# Patient Record
Sex: FEMALE | Race: BLACK OR AFRICAN AMERICAN | Employment: UNEMPLOYED | URBAN - METROPOLITAN AREA
[De-identification: names, ages, dates, MRNs, and addresses within clinical notes are randomized per-mention and may not be internally consistent; named-entity substitution may affect disease eponyms.]

---

## 2017-01-19 ENCOUNTER — ED HISTORICAL/CONVERTED ENCOUNTER (OUTPATIENT)
Dept: OTHER | Age: 27
End: 2017-01-19

## 2017-01-23 ENCOUNTER — ED HISTORICAL/CONVERTED ENCOUNTER (OUTPATIENT)
Dept: OTHER | Age: 27
End: 2017-01-23

## 2017-04-15 ENCOUNTER — ED HISTORICAL/CONVERTED ENCOUNTER (OUTPATIENT)
Dept: OTHER | Age: 27
End: 2017-04-15

## 2018-07-12 ENCOUNTER — ED HISTORICAL/CONVERTED ENCOUNTER (OUTPATIENT)
Dept: OTHER | Age: 28
End: 2018-07-12

## 2018-09-30 ENCOUNTER — ED HISTORICAL/CONVERTED ENCOUNTER (OUTPATIENT)
Dept: OTHER | Age: 28
End: 2018-09-30

## 2019-05-12 ENCOUNTER — ED HISTORICAL/CONVERTED ENCOUNTER (OUTPATIENT)
Dept: OTHER | Age: 29
End: 2019-05-12

## 2019-07-03 ENCOUNTER — ED HISTORICAL/CONVERTED ENCOUNTER (OUTPATIENT)
Dept: OTHER | Age: 29
End: 2019-07-03

## 2019-11-26 ENCOUNTER — ED HISTORICAL/CONVERTED ENCOUNTER (OUTPATIENT)
Dept: OTHER | Age: 29
End: 2019-11-26

## 2020-01-21 ENCOUNTER — ED HISTORICAL/CONVERTED ENCOUNTER (OUTPATIENT)
Dept: OTHER | Age: 30
End: 2020-01-21

## 2020-03-26 ENCOUNTER — ED HISTORICAL/CONVERTED ENCOUNTER (OUTPATIENT)
Dept: OTHER | Age: 30
End: 2020-03-26

## 2020-08-11 ENCOUNTER — ED HISTORICAL/CONVERTED ENCOUNTER (OUTPATIENT)
Dept: OTHER | Age: 30
End: 2020-08-11

## 2020-09-28 ENCOUNTER — APPOINTMENT (OUTPATIENT)
Dept: GENERAL RADIOLOGY | Age: 30
End: 2020-09-28
Attending: INTERNAL MEDICINE
Payer: COMMERCIAL

## 2020-09-28 ENCOUNTER — HOSPITAL ENCOUNTER (EMERGENCY)
Age: 30
Discharge: HOME | End: 2020-09-28
Payer: COMMERCIAL

## 2020-09-28 VITALS
DIASTOLIC BLOOD PRESSURE: 86 MMHG | RESPIRATION RATE: 18 BRPM | HEIGHT: 70 IN | OXYGEN SATURATION: 99 % | HEART RATE: 87 BPM | SYSTOLIC BLOOD PRESSURE: 128 MMHG | BODY MASS INDEX: 40.8 KG/M2 | TEMPERATURE: 98.5 F | WEIGHT: 285 LBS

## 2020-09-28 DIAGNOSIS — J45.901 ALLERGIC BRONCHITIS WITH ACUTE EXACERBATION: Primary | ICD-10-CM

## 2020-09-28 PROCEDURE — 74011000250 HC RX REV CODE- 250: Performed by: NURSE PRACTITIONER

## 2020-09-28 PROCEDURE — 74011636637 HC RX REV CODE- 636/637: Performed by: NURSE PRACTITIONER

## 2020-09-28 PROCEDURE — 71045 X-RAY EXAM CHEST 1 VIEW: CPT

## 2020-09-28 PROCEDURE — 94640 AIRWAY INHALATION TREATMENT: CPT

## 2020-09-28 PROCEDURE — 99283 EMERGENCY DEPT VISIT LOW MDM: CPT

## 2020-09-28 RX ORDER — IPRATROPIUM BROMIDE AND ALBUTEROL SULFATE 2.5; .5 MG/3ML; MG/3ML
3 SOLUTION RESPIRATORY (INHALATION)
Status: COMPLETED | OUTPATIENT
Start: 2020-09-28 | End: 2020-09-28

## 2020-09-28 RX ORDER — PREDNISONE 20 MG/1
60 TABLET ORAL
Status: DISCONTINUED | OUTPATIENT
Start: 2020-09-29 | End: 2020-09-28

## 2020-09-28 RX ORDER — PROMETHAZINE HYDROCHLORIDE AND DEXTROMETHORPHAN HYDROBROMIDE 6.25; 15 MG/5ML; MG/5ML
5 SYRUP ORAL
Qty: 120 ML | Refills: 0 | Status: SHIPPED | OUTPATIENT
Start: 2020-09-28 | End: 2020-10-05

## 2020-09-28 RX ORDER — ALBUTEROL SULFATE 90 UG/1
2 AEROSOL, METERED RESPIRATORY (INHALATION)
Qty: 1 INHALER | Refills: 0 | Status: SHIPPED | OUTPATIENT
Start: 2020-09-28 | End: 2021-08-30

## 2020-09-28 RX ORDER — PREDNISONE 20 MG/1
60 TABLET ORAL
Status: COMPLETED | OUTPATIENT
Start: 2020-09-28 | End: 2020-09-28

## 2020-09-28 RX ORDER — PREDNISONE 20 MG/1
TABLET ORAL
Qty: 18 TAB | Refills: 0 | Status: SHIPPED | OUTPATIENT
Start: 2020-09-28 | End: 2021-08-30

## 2020-09-28 RX ADMIN — IPRATROPIUM BROMIDE AND ALBUTEROL SULFATE 3 ML: .5; 3 SOLUTION RESPIRATORY (INHALATION) at 15:06

## 2020-09-28 RX ADMIN — PREDNISONE 60 MG: 20 TABLET ORAL at 14:54

## 2020-09-28 NOTE — LETTER
[address] [place] [address] EMERGENCY DEPT 
[facility] [address] [address] 
[phone] Work/School Note Date: 9/28/2020 To Whom It May concern: 
 
Jaspreet Palma was seen and treated today in the emergency room by the following provider(s): 
Nurse Practitioner: Viral Tsai NP. Jaspreet Palma may return to work on 9/30/2020. Sincerely, Mike Hallman

## 2020-09-28 NOTE — ED PROVIDER NOTES
EMERGENCY DEPARTMENT HISTORY AND PHYSICAL EXAM      Date: 9/28/2020  Patient Name: Jazlyn Ward    History of Presenting Illness     Chief Complaint   Patient presents with    Cough       History Provided By: Patient    HPI: Jazlyn Ward, 31 y.o. female with a past medical history significant No significant past medical history presents to the ED with cc of cough. Patient denies fever chills nausea vomiting. Denies sick contacts. Patient smells of marijuana when examined. There are no other complaints, changes, or physical findings at this time. PCP: UNKNOWN    No current facility-administered medications on file prior to encounter. No current outpatient medications on file prior to encounter. Past History     Past Medical History:  No past medical history on file. Past Surgical History:  No past surgical history on file. Family History:  No family history on file. Social History:  Social History     Tobacco Use    Smoking status: Former Smoker   Substance Use Topics    Alcohol use: Not Currently    Drug use: Yes     Types: Marijuana       Allergies:  No Known Allergies      Review of Systems     Review of Systems   Constitutional: Negative for chills, fatigue and fever. HENT: Negative for congestion, sinus pressure and trouble swallowing. Eyes: Negative for photophobia and pain. Respiratory: Positive for cough. Negative for shortness of breath. Cardiovascular: Negative for chest pain and leg swelling. Gastrointestinal: Negative for abdominal pain, diarrhea, nausea and vomiting. Endocrine: Negative for polydipsia, polyphagia and polyuria. Genitourinary: Negative for decreased urine volume, difficulty urinating, dysuria, hematuria and urgency. Musculoskeletal: Negative for back pain, gait problem, myalgias and neck pain. Skin: Negative for pallor and rash. Allergic/Immunologic: Negative for environmental allergies and food allergies.    Neurological: Negative for dizziness, facial asymmetry, speech difficulty, numbness and headaches. Hematological: Negative for adenopathy. Does not bruise/bleed easily. Psychiatric/Behavioral: Negative for agitation, self-injury and suicidal ideas. The patient is not nervous/anxious. Physical Exam     Physical Exam  Vitals signs and nursing note reviewed. Constitutional:       Appearance: Normal appearance. HENT:      Head: Atraumatic. Right Ear: Tympanic membrane and external ear normal.      Left Ear: Tympanic membrane and external ear normal.      Nose: Nose normal.      Mouth/Throat:      Mouth: Mucous membranes are dry. Eyes:      Extraocular Movements: Extraocular movements intact. Pupils: Pupils are equal, round, and reactive to light. Neck:      Musculoskeletal: Normal range of motion and neck supple. Cardiovascular:      Rate and Rhythm: Normal rate and regular rhythm. Pulses: Normal pulses. Heart sounds: Normal heart sounds. Pulmonary:      Effort: No respiratory distress. Breath sounds: Wheezing present. Abdominal:      General: Abdomen is flat. Palpations: Abdomen is soft. Musculoskeletal: Normal range of motion. Skin:     General: Skin is warm and dry. Capillary Refill: Capillary refill takes less than 2 seconds. Neurological:      General: No focal deficit present. Mental Status: She is alert and oriented to person, place, and time. Mental status is at baseline. Psychiatric:         Mood and Affect: Mood normal.         Behavior: Behavior normal.         Diagnostic Study Results     Labs -   No results found for this or any previous visit (from the past 12 hour(s)). Radiologic Studies -   @lastxrresult@  CT Results  (Last 48 hours)    None        CXR Results  (Last 48 hours)               09/28/20 1408  XR CHEST SNGL V Final result    Impression:  IMPRESSION:    No acute finding.            Narrative:  EXAM: XR CHEST SNGL V       INDICATION: cough       COMPARISON: No images available from prior exam to review       FINDINGS:    Unremarkable cardiomediastinal contours. No focal airspace consolidation. No   pneumothorax or pleural effusion. No acute fracture or dislocation. Medical Decision Making   I am the first provider for this patient. I reviewed the vital signs, available nursing notes, past medical history, past surgical history, family history and social history. Vital Signs-Reviewed the patient's vital signs. Patient Vitals for the past 12 hrs:   Temp Pulse Resp BP SpO2   09/28/20 1506 -- -- -- -- 100 %   09/28/20 1030 98.5 °F (36.9 °C) 81 20 (!) 139/91 100 %       Records Reviewed: Nursing Notes    The patient presents with cough with a differential diagnosis of pneumonia, bronchitis, influenza, COVID-19, smoker's cough      Provider Notes (Medical Decision Making):     MDM  Number of Diagnoses or Management Options  Allergic bronchitis with acute exacerbation: established, improving     Amount and/or Complexity of Data Reviewed  Tests in the radiology section of CPT®: ordered and reviewed  Discussion of test results with the performing providers: yes  Obtain history from someone other than the patient: yes  Discuss the patient with other providers: no    Risk of Complications, Morbidity, and/or Mortality  Presenting problems: low  Diagnostic procedures: low  Management options: low             ED Course:   Initial assessment performed. The patients presenting problems have been discussed, and they are in agreement with the care plan formulated and outlined with them. I have encouraged them to ask questions as they arise throughout their visit. PROCEDURES        PLAN:  1. There are no discharge medications for this patient. 2.   Follow-up Information    None       Return to ED if worse     Diagnosis     Clinical Impression: No diagnosis found.

## 2020-09-28 NOTE — ED TRIAGE NOTES
Productive cough with clear/yellow sputum, nasal congestion, itchy watery eyes and sore throat x1 wk. Denies bodyaches, chills or fever.

## 2020-09-28 NOTE — ED NOTES
Patient given discharge paperwork and  Scripts sent to pharmacy of patient choice. Patient verbalized understand of instructions and importance of following up with PCP. Patient ambulatory and in no acute distress.

## 2020-10-23 ENCOUNTER — HOSPITAL ENCOUNTER (EMERGENCY)
Age: 30
Discharge: HOME | End: 2020-10-23
Attending: EMERGENCY MEDICINE
Payer: COMMERCIAL

## 2020-10-23 VITALS
DIASTOLIC BLOOD PRESSURE: 83 MMHG | RESPIRATION RATE: 18 BRPM | BODY MASS INDEX: 42.36 KG/M2 | SYSTOLIC BLOOD PRESSURE: 131 MMHG | TEMPERATURE: 98.7 F | HEIGHT: 69 IN | WEIGHT: 286 LBS | OXYGEN SATURATION: 100 % | HEART RATE: 65 BPM

## 2020-10-23 DIAGNOSIS — H65.111 NON-RECURRENT ACUTE ALLERGIC OTITIS MEDIA OF RIGHT EAR: Primary | ICD-10-CM

## 2020-10-23 PROCEDURE — 99282 EMERGENCY DEPT VISIT SF MDM: CPT

## 2020-10-23 RX ORDER — AMOXICILLIN 875 MG/1
875 TABLET, FILM COATED ORAL 2 TIMES DAILY
Qty: 14 TAB | Refills: 0 | Status: SHIPPED | OUTPATIENT
Start: 2020-10-23 | End: 2020-10-30

## 2020-10-23 NOTE — ED PROVIDER NOTES
EMERGENCY DEPARTMENT HISTORY AND PHYSICAL EXAM      Date: 10/23/2020  Patient Name: Marian Rojas    History of Presenting Illness     Chief Complaint   Patient presents with    Ear Pain       History Provided By: Patient    HPI: Marian Rojas, 31 y.o. female with a past medical history significant No significant past medical history presents to the ED with cc of pain in right ear with neck swelling. The pain began 2 days ago is getting progressively worse is also said that she is a little bit of blood on a Q-tip in the right ear. There are no exacerbating or relieving factors except for loud noises make it worse. She specifically denies fever, chills, nausea, vomiting, chest pain, shortness of breath, rash, diarrhea, headache, night sweats, weight loss. Symptoms are moderate in nature at this point time    There are no other complaints, changes, or physical findings at this time. PCP: UNKNOWN    No current facility-administered medications on file prior to encounter. Current Outpatient Medications on File Prior to Encounter   Medication Sig Dispense Refill    albuterol (Ventolin HFA) 90 mcg/actuation inhaler Take 2 Puffs by inhalation every four (4) hours as needed for Wheezing, Shortness of Breath or Cough. 1 Inhaler 0    predniSONE (DELTASONE) 20 mg tablet 3 Tabs for 3 days 2 tabs for 3 days 1 tab for 3 days 18 Tab 0       Past History     Past Medical History:  Past Medical History:   Diagnosis Date    Asthma        Past Surgical History:  History reviewed. No pertinent surgical history. Family History:  History reviewed. No pertinent family history.     Social History:  Social History     Tobacco Use    Smoking status: Former Smoker     Last attempt to quit: 2020     Years since quittin.2    Smokeless tobacco: Never Used   Substance Use Topics    Alcohol use: Never     Frequency: Never    Drug use: Yes     Types: Marijuana       Allergies:  No Known Allergies      Review of Systems     Review of Systems   Constitutional: Negative. Negative for activity change, appetite change, chills, fatigue, fever and unexpected weight change. HENT: Positive for ear discharge and ear pain. Negative for congestion, hearing loss, rhinorrhea, sneezing and voice change. Eyes: Negative. Negative for pain and visual disturbance. Respiratory: Negative. Negative for apnea, cough, choking, chest tightness and shortness of breath. Cardiovascular: Negative. Negative for chest pain and palpitations. Gastrointestinal: Negative. Negative for abdominal distention, abdominal pain, blood in stool, diarrhea, nausea and vomiting. Genitourinary: Negative. Negative for difficulty urinating, flank pain, frequency and urgency. No discharge   Musculoskeletal: Negative. Negative for arthralgias, back pain, myalgias and neck stiffness. Skin: Negative. Negative for color change and rash. Allergic/Immunologic: Negative for immunocompromised state. Neurological: Negative. Negative for dizziness, seizures, syncope, speech difficulty, weakness, numbness and headaches. Hematological: Negative for adenopathy. Psychiatric/Behavioral: Negative. Negative for agitation, behavioral problems, dysphoric mood and suicidal ideas. The patient is not nervous/anxious. Physical Exam     Physical Exam  Vitals signs and nursing note reviewed. Constitutional:       General: She is not in acute distress. Appearance: She is well-developed. HENT:      Head: Normocephalic and atraumatic. Comments: Patient has right acute otitis externa with small component of otitis media     Right Ear: Tympanic membrane normal.      Left Ear: Tympanic membrane normal.      Nose: Nose normal.      Mouth/Throat:      Mouth: Mucous membranes are moist.      Pharynx: Oropharynx is clear. No oropharyngeal exudate. Eyes:      General:         Right eye: No discharge. Left eye: No discharge. Conjunctiva/sclera: Conjunctivae normal.      Pupils: Pupils are equal, round, and reactive to light. Neck:      Musculoskeletal: Normal range of motion and neck supple. Comments: Right-sided anterior cervical chain lymphadenopathy  Cardiovascular:      Rate and Rhythm: Normal rate and regular rhythm. Chest Wall: PMI is not displaced. No thrill. Heart sounds: Normal heart sounds. No murmur. No friction rub. No gallop. Pulmonary:      Effort: Pulmonary effort is normal. No respiratory distress. Breath sounds: Normal breath sounds. No wheezing or rales. Chest:      Chest wall: No tenderness. Abdominal:      General: Bowel sounds are normal. There is no distension. Palpations: Abdomen is soft. There is no mass. Tenderness: There is no abdominal tenderness. There is no guarding or rebound. Musculoskeletal: Normal range of motion. Lymphadenopathy:      Cervical: No cervical adenopathy. Skin:     General: Skin is warm and dry. Capillary Refill: Capillary refill takes less than 2 seconds. Findings: No erythema or rash. Neurological:      Mental Status: She is alert and oriented to person, place, and time. Cranial Nerves: No cranial nerve deficit. Coordination: Coordination normal.   Psychiatric:         Mood and Affect: Mood normal.         Behavior: Behavior normal.         Diagnostic Study Results     Labs -   No results found for this or any previous visit (from the past 12 hour(s)). Radiologic Studies -   @lastxrresult@  CT Results  (Last 48 hours)    None        CXR Results  (Last 48 hours)    None            Medical Decision Making   I am the first provider for this patient. I reviewed the vital signs, available nursing notes, past medical history, past surgical history, family history and social history. Vital Signs-Reviewed the patient's vital signs.   Patient Vitals for the past 12 hrs:   Temp Pulse Resp BP SpO2   10/23/20 1353 98.7 °F (37.1 °C) 65 18 131/83 100 %       Records Reviewed: Nursing Notes    The patient presents with right ear pain and right neck pain with a differential diagnosis of otitis interna acute otitis externa      Provider Notes (Medical Decision Making):   Patient appears to have acute otitis externa with concomitant interna. There is a small amount of blood in the canal but no appreciable rupture at this time. Will treat with oral antibiotics as an outpatient encourage follow-up with ENT. [facility]         ED Course:   Initial assessment performed. The patients presenting problems have been discussed, and they are in agreement with the care plan formulated and outlined with them. I have encouraged them to ask questions as they arise throughout their visit. PLAN:  1. Current Discharge Medication List      START taking these medications    Details   amoxicillin (AMOXIL) 875 mg tablet Take 1 Tab by mouth two (2) times a day for 7 days. Qty: 14 Tab, Refills: 0           2. Follow-up Information     Follow up With Specialties Details Why [address] EMERGENCY DEPT Emergency Medicine Call  As needed, If symptoms worsen Nestor A[facility]  [phone]        Return to ED if worse     Diagnosis     Clinical Impression:   1.  Non-recurrent acute allergic otitis media of right ear

## 2020-10-23 NOTE — LETTER
[address] [place] [address] EMERGENCY DEPT 
[facility] [address] [address] 
[phone] Work/School Note Date: 10/23/2020 To Whom It May concern: 
 
Gerardo Vinson was seen and treated today in the emergency room by the following provider(s): 
Attending Provider: Jose Enrique Chacon MD. Gerardo Vinson {Return to school/sport/work:10/25/2020} Sincerely, Nina Honeycutt

## 2020-10-23 NOTE — LETTER
[address] [place] [address] EMERGENCY DEPT 
[facility] [address] [address] 
[phone] Work/School Note Date: 10/23/2020 To Whom It May concern: 
 
Joy Paniagua was seen and treated today in the emergency room by the following provider(s): 
Attending Provider: João Escalante MD. Joy Paniagua may return to work on 10/25/2020. Sincerely, Nina Honeycutt

## 2021-06-04 ENCOUNTER — HOSPITAL ENCOUNTER (EMERGENCY)
Age: 31
Discharge: HOME | End: 2021-06-04
Payer: COMMERCIAL

## 2021-06-04 VITALS
WEIGHT: 290 LBS | OXYGEN SATURATION: 99 % | BODY MASS INDEX: 42.95 KG/M2 | HEART RATE: 88 BPM | TEMPERATURE: 99 F | DIASTOLIC BLOOD PRESSURE: 92 MMHG | HEIGHT: 69 IN | SYSTOLIC BLOOD PRESSURE: 139 MMHG | RESPIRATION RATE: 18 BRPM

## 2021-06-04 DIAGNOSIS — J01.30 ACUTE SPHENOIDAL SINUSITIS, RECURRENCE NOT SPECIFIED: ICD-10-CM

## 2021-06-04 DIAGNOSIS — B34.9 VIRAL ILLNESS: Primary | ICD-10-CM

## 2021-06-04 DIAGNOSIS — Z11.52 ENCOUNTER FOR SCREENING FOR COVID-19: ICD-10-CM

## 2021-06-04 LAB
COVID-19 RAPID TEST, COVR: NOT DETECTED
SARS-COV-2, COV2: NORMAL
SPECIMEN SOURCE: NORMAL

## 2021-06-04 PROCEDURE — 99283 EMERGENCY DEPT VISIT LOW MDM: CPT

## 2021-06-04 PROCEDURE — 74011636637 HC RX REV CODE- 636/637: Performed by: NURSE PRACTITIONER

## 2021-06-04 PROCEDURE — 87635 SARS-COV-2 COVID-19 AMP PRB: CPT

## 2021-06-04 RX ORDER — FLUTICASONE PROPIONATE 50 MCG
2 SPRAY, SUSPENSION (ML) NASAL DAILY
Qty: 1 BOTTLE | Refills: 0 | Status: SHIPPED | OUTPATIENT
Start: 2021-06-04

## 2021-06-04 RX ORDER — SODIUM CHLORIDE 0.65 %
2 DROPS NASAL
Qty: 15 ML | Refills: 0 | Status: SHIPPED | OUTPATIENT
Start: 2021-06-04 | End: 2021-08-30

## 2021-06-04 RX ORDER — LORATADINE 10 MG/1
10 TABLET ORAL DAILY
Qty: 30 TABLET | Refills: 0 | Status: SHIPPED | OUTPATIENT
Start: 2021-06-04 | End: 2021-07-04

## 2021-06-04 RX ORDER — PREDNISONE 20 MG/1
60 TABLET ORAL
Status: COMPLETED | OUTPATIENT
Start: 2021-06-04 | End: 2021-06-04

## 2021-06-04 RX ADMIN — PREDNISONE 60 MG: 20 TABLET ORAL at 13:25

## 2021-06-04 NOTE — ED PROVIDER NOTES
EMERGENCY DEPARTMENT HISTORY AND PHYSICAL EXAM      Date: 2021  Patient Name: Nancy Winston    History of Presenting Illness     Chief Complaint   Patient presents with    Nasal Congestion    Cough       History Provided By: Patient    HPI: Nancy Winston, 27 y.o. female with a past medical history significant asthma presents to the ED with cc of facial pain, nasal congestion, sore throat, loss of taste, loss of smell, cough for the past 2 days. She reports use of DayQuil, old amoxicillin, Tylenol, Fioricet with minimal relief. Denies any fever, chills, shortness of breath, nausea, vomiting, abdominal pain, urinary frequency urgency or burning. Patient reports management of asthma with Flovent, albuterol, montelukast.0    There are no other complaints, changes, or physical findings at this time. PCP: Jaziel Chu NP    No current facility-administered medications on file prior to encounter. Current Outpatient Medications on File Prior to Encounter   Medication Sig Dispense Refill    albuterol (Ventolin HFA) 90 mcg/actuation inhaler Take 2 Puffs by inhalation every four (4) hours as needed for Wheezing, Shortness of Breath or Cough. 1 Inhaler 0    predniSONE (DELTASONE) 20 mg tablet 3 Tabs for 3 days 2 tabs for 3 days 1 tab for 3 days 18 Tab 0       Past History     Past Medical History:  Past Medical History:   Diagnosis Date    Asthma        Past Surgical History:  History reviewed. No pertinent surgical history. Family History:  History reviewed. No pertinent family history. Social History:  Social History     Tobacco Use    Smoking status: Former Smoker     Quit date: 2020     Years since quittin.8    Smokeless tobacco: Never Used   Substance Use Topics    Alcohol use: Never    Drug use: Yes     Types: Marijuana       Allergies:  No Known Allergies      Review of Systems     Review of Systems   Constitutional: Negative for chills and fever.         Loss of taste and smell   HENT: Positive for congestion, sinus pressure, sinus pain and sore throat. Respiratory: Positive for cough. Negative for shortness of breath. Cardiovascular: Negative for chest pain and leg swelling. Gastrointestinal: Negative for abdominal pain, nausea and vomiting. Genitourinary: Negative for dysuria, frequency and urgency. Musculoskeletal: Negative for arthralgias and myalgias. Skin: Negative. Neurological: Negative for dizziness, weakness, light-headedness, numbness and headaches. Psychiatric/Behavioral: Negative. Physical Exam     Physical Exam  Vitals and nursing note reviewed. Constitutional:       General: She is not in acute distress. Appearance: Normal appearance. She is normal weight. She is not ill-appearing or toxic-appearing. HENT:      Head: Normocephalic and atraumatic. Right Ear: Hearing, tympanic membrane, ear canal and external ear normal.      Left Ear: Hearing, tympanic membrane, ear canal and external ear normal.      Nose: Nose normal. No nasal tenderness, mucosal edema, congestion or rhinorrhea. Right Turbinates: Not enlarged or swollen. Left Turbinates: Not enlarged or swollen. Comments: Sphenoid tenderness     Mouth/Throat:      Mouth: Mucous membranes are moist.      Pharynx: Oropharynx is clear. Uvula midline. Tonsils: No tonsillar exudate or tonsillar abscesses. Eyes:      General: Lids are normal.      Extraocular Movements: Extraocular movements intact. Pupils: Pupils are equal, round, and reactive to light. Cardiovascular:      Rate and Rhythm: Normal rate and regular rhythm. Pulses: Normal pulses. Radial pulses are 2+ on the right side and 2+ on the left side. Dorsalis pedis pulses are 2+ on the right side and 2+ on the left side. Pulmonary:      Effort: Pulmonary effort is normal. No accessory muscle usage or respiratory distress. Breath sounds: Normal breath sounds.  No wheezing or rhonchi. Abdominal:      General: Bowel sounds are normal.      Palpations: Abdomen is soft. Tenderness: There is no abdominal tenderness. There is no right CVA tenderness or left CVA tenderness. Musculoskeletal:      Cervical back: Normal range of motion and neck supple. No muscular tenderness. Right lower leg: No edema. Left lower leg: No edema. Feet:      Right foot:      Skin integrity: No skin breakdown. Left foot:      Skin integrity: No skin breakdown. Skin:     General: Skin is warm and dry. Capillary Refill: Capillary refill takes less than 2 seconds. Findings: No abrasion, bruising, ecchymosis, erythema or signs of injury. Neurological:      Mental Status: She is alert and oriented to person, place, and time. GCS: GCS eye subscore is 4. GCS verbal subscore is 5. GCS motor subscore is 6. Cranial Nerves: Cranial nerves are intact. Sensory: Sensation is intact. Psychiatric:         Attention and Perception: Attention normal.         Mood and Affect: Mood normal.         Behavior: Behavior normal. Behavior is cooperative. Cognition and Memory: Cognition normal.         Lab and Diagnostic Study Results     Labs -     Recent Results (from the past 12 hour(s))   SARS-COV-2    Collection Time: 06/04/21 12:17 PM   Result Value Ref Range    SARS-CoV-2 Please find results under separate order     COVID-19 RAPID TEST    Collection Time: 06/04/21 12:17 PM   Result Value Ref Range    Specimen source Nasopharyngeal      COVID-19 rapid test Not Detected Not Detected         Radiologic Studies -   @lastxrresult@  CT Results  (Last 48 hours)    None        CXR Results  (Last 48 hours)    None            Medical Decision Making   - I am the first provider for this patient. - I reviewed the vital signs, available nursing notes, past medical history, past surgical history, family history and social history. - Initial assessment performed.  The patients presenting problems have been discussed, and they are in agreement with the care plan formulated and outlined with them. I have encouraged them to ask questions as they arise throughout their visit. Vital Signs-Reviewed the patient's vital signs. Patient Vitals for the past 12 hrs:   Temp Pulse Resp BP SpO2   06/04/21 1207 99 °F (37.2 °C) 88 18 (!) 139/92 99 %       The patient presents with cough, congestion with a differential diagnosis of viral illness, sinus infection, COVID-19, allergies      ED Course:          Provider Notes (Medical Decision Making):   Patient in no acute respiratory distress, SPO2 99% not tachycardic respirations 18, afebrile. Lungs clear to auscultation no rales rhonchi or wheezing noted. COVID-19 negative. Mild tenderness to sphenoid sinuses with palpation patient planes of mostly congestion in the face we will treat for sinusitis with supportive care verbalized understanding follow-up PCP stable at time of discharge      Procedures   Medical Decision Makingedical Decision Making  Performed by: Robyn Mercado NP  PROCEDURES:  Procedures       Disposition   Disposition: DC- Adult Discharges: All of the diagnostic tests were reviewed and questions answered. Diagnosis, care plan and treatment options were discussed. The patient understands the instructions and will follow up as directed. The patients results have been reviewed with them. They have been counseled regarding their diagnosis. The patient verbally convey understanding and agreement of the signs, symptoms, diagnosis, treatment and prognosis and additionally agrees to follow up as recommended with their PCP in 24 - 48 hours. They also agree with the care-plan and convey that all of their questions have been answered.   I have also put together some discharge instructions for them that include: 1) educational information regarding their diagnosis, 2) how to care for their diagnosis at home, as well a 3) list of reasons why they would want to return to the ED prior to their follow-up appointment, should their condition change. Discharged    DISCHARGE PLAN:  1. Current Discharge Medication List      START taking these medications    Details   sodium chloride ([place] Saline) 0.65 % drop 2 Drops by Both Nostrils route every two (2) hours as needed for Congestion. Qty: 15 mL, Refills: 0      fluticasone propionate (FLONASE) 50 mcg/actuation nasal spray 2 Sprays by Both Nostrils route daily. Qty: 1 Bottle, Refills: 0      loratadine (CLARITIN) 10 mg tablet Take 1 Tablet by mouth daily for 30 days. Qty: 30 Tablet, Refills: 0         CONTINUE these medications which have NOT CHANGED    Details   albuterol (Ventolin HFA) 90 mcg/actuation inhaler Take 2 Puffs by inhalation every four (4) hours as needed for Wheezing, Shortness of Breath or Cough. Qty: 1 Inhaler, Refills: 0      predniSONE (DELTASONE) 20 mg tablet 3 Tabs for 3 days 2 tabs for 3 days 1 tab for 3 days  Qty: 18 Tab, Refills: 0           2. Follow-up Information     Follow up With Specialties Details Why Contact Chanelle Lamb NP Nurse Practitioner Schedule an appointment as soon as possible for a visit in 1 week As needed, If symptoms worsen [address] [place] [address]  [phone]          3. Return to ED if worse   4. Current Discharge Medication List      START taking these medications    Details   sodium chloride (Ayr Saline) 0.65 % drop 2 Drops by Both Nostrils route every two (2) hours as needed for Congestion. Qty: 15 mL, Refills: 0  Start date: 6/4/2021      fluticasone propionate (FLONASE) 50 mcg/actuation nasal spray 2 Sprays by Both Nostrils route daily. Qty: 1 Bottle, Refills: 0  Start date: 6/4/2021      loratadine (CLARITIN) 10 mg tablet Take 1 Tablet by mouth daily for 30 days. Qty: 30 Tablet, Refills: 0  Start date: 6/4/2021, End date: 7/4/2021               Diagnosis     Clinical Impression:   1.  Viral illness    2. Encounter for screening for COVID-19    3. Acute sphenoidal sinusitis, recurrence not specified        Attestations:    Maxwell Barajas NP    Please note that this dictation was completed with ipvive, the computer voice recognition software. Quite often unanticipated grammatical, syntax, homophones, and other interpretive errors are inadvertently transcribed by the computer software. Please disregard these errors. Please excuse any errors that have escaped final proofreading. Thank you.

## 2021-06-04 NOTE — DISCHARGE INSTRUCTIONS
Thank you! Thank you for allowing me to care for you in the emergency department. I sincerely hope that you are satisfied with your visit today. It is my goal to provide you with excellent care. Below you will find a list of your labs and imaging from your visit today. Should you have any questions regarding these results please do not hesitate to call the emergency department. Labs -     Recent Results (from the past 12 hour(s))   SARS-COV-2    Collection Time: 06/04/21 12:17 PM   Result Value Ref Range    SARS-CoV-2 Please find results under separate order     COVID-19 RAPID TEST    Collection Time: 06/04/21 12:17 PM   Result Value Ref Range    Specimen source Nasopharyngeal      COVID-19 rapid test Not Detected Not Detected         Radiologic Studies -   No orders to display     CT Results  (Last 48 hours)      None          CXR Results  (Last 48 hours)      None               If you feel that you have not received excellent quality care or timely care, please ask to speak to the nurse manager. Please choose us in the future for your continued health care needs. ------------------------------------------------------------------------------------------------------------  The exam and treatment you received in the Emergency Department were for an urgent problem and are not intended as complete care. It is important that you follow-up with a doctor, nurse practitioner, or physician assistant to:  (1) confirm your diagnosis,  (2) re-evaluation of changes in your illness and treatment, and  (3) for ongoing care. If your symptoms become worse or you do not improve as expected and you are unable to reach your usual health care provider, you should return to the Emergency Department. We are available 24 hours a day. Please take your discharge instructions with you when you go to your follow-up appointment.      If you have any problem arranging a follow-up appointment, contact the Emergency Department immediately. If a prescription has been provided, please have it filled as soon as possible to prevent a delay in treatment. Read the entire medication instruction sheet provided to you by the pharmacy. If you have any questions or reservations about taking the medication due to side effects or interactions with other medications, please call your primary care physician or contact the ER to speak with the charge nurse. Make an appointment with your family doctor or the physician you were referred to for follow-up of this visit as instructed on your discharge paperwork, as this is a mandatory follow-up. Return to the ER if you are unable to be seen or if you are unable to be seen in a timely manner. If you have any problem arranging the follow-up visit, contact the Emergency Department immediately.

## 2021-06-04 NOTE — ED TRIAGE NOTES
P/t c/o being sick for 2 days, congestion, lost smell and taste, body aches, cough, sore throat, pain in ears,

## 2021-06-04 NOTE — LETTER
NOTIFICATION RETURN TO WORK / SCHOOL 
 
6/4/2021 1:28 PM 
 
Ms. Tab Willis [address] [facility] [id number] To Whom It May Concern: 
 
Tab Willis is currently under the care of [address] [place] [address] EMERGENCY DEPT. She will return to work/school on: 06/05/2021 If there are questions or concerns please have the patient contact our office. Sincerely, Rizwana Andres NP

## 2021-07-23 ENCOUNTER — HOSPITAL ENCOUNTER (EMERGENCY)
Age: 31
Discharge: HOME | End: 2021-07-23
Attending: EMERGENCY MEDICINE
Payer: COMMERCIAL

## 2021-07-23 VITALS
RESPIRATION RATE: 18 BRPM | HEIGHT: 69 IN | SYSTOLIC BLOOD PRESSURE: 119 MMHG | HEART RATE: 82 BPM | BODY MASS INDEX: 42.95 KG/M2 | OXYGEN SATURATION: 97 % | TEMPERATURE: 99 F | DIASTOLIC BLOOD PRESSURE: 67 MMHG | WEIGHT: 290 LBS

## 2021-07-23 DIAGNOSIS — Z20.822 SUSPECTED COVID-19 VIRUS INFECTION: ICD-10-CM

## 2021-07-23 DIAGNOSIS — J01.90 ACUTE SINUSITIS, RECURRENCE NOT SPECIFIED, UNSPECIFIED LOCATION: ICD-10-CM

## 2021-07-23 DIAGNOSIS — J06.9 UPPER RESPIRATORY TRACT INFECTION, UNSPECIFIED TYPE: Primary | ICD-10-CM

## 2021-07-23 LAB — SARS-COV-2, COV2: NORMAL

## 2021-07-23 PROCEDURE — 99281 EMR DPT VST MAYX REQ PHY/QHP: CPT

## 2021-07-23 PROCEDURE — U0003 INFECTIOUS AGENT DETECTION BY NUCLEIC ACID (DNA OR RNA); SEVERE ACUTE RESPIRATORY SYNDROME CORONAVIRUS 2 (SARS-COV-2) (CORONAVIRUS DISEASE [COVID-19]), AMPLIFIED PROBE TECHNIQUE, MAKING USE OF HIGH THROUGHPUT TECHNOLOGIES AS DESCRIBED BY CMS-2020-01-R: HCPCS

## 2021-07-23 RX ORDER — AZITHROMYCIN 250 MG/1
TABLET, FILM COATED ORAL
Qty: 6 TABLET | Refills: 0 | Status: SHIPPED | OUTPATIENT
Start: 2021-07-23 | End: 2021-08-30

## 2021-07-23 RX ORDER — ALBUTEROL SULFATE 90 UG/1
2 AEROSOL, METERED RESPIRATORY (INHALATION)
Qty: 1 INHALER | Refills: 0 | Status: SHIPPED | OUTPATIENT
Start: 2021-07-23

## 2021-07-23 RX ORDER — DEXTROMETHORPHAN HYDROBROMIDE, GUAIFENESIN 20; 400 MG/20ML; MG/20ML
5 SOLUTION ORAL EVERY 6 HOURS
Qty: 200 ML | Refills: 0 | Status: SHIPPED | OUTPATIENT
Start: 2021-07-23 | End: 2021-08-30

## 2021-07-23 NOTE — Clinical Note
[facility] EMERGENCY DEPT  [place] [id number]  [phone]    Work/School Note    Date: 7/23/2021    To Whom It May concern:    Romain Anderson was seen and treated today in the emergency room by the following provider(s):  Attending Provider: Braeden Strong MD.      Romain Anderson is excused from work/school on 07/23/21 and 07/24/21. She is medically clear to return to work/school on 7/25/2021.        Sincerely,          Daphane Opitz, MD

## 2021-07-23 NOTE — Clinical Note
[facility] EMERGENCY DEPT  [address] [facility] [place] [address]  [phone]    Work/School Note    Date: 7/23/2021     To Whom It May concern:    Gilberto Asher was evaulated by the following provider(s):  Attending Provider: Noemi Williamson MD.   COVID19 virus is suspected. Per the CDC guidelines we recommend home isolation until the following conditions are all met:    1. At least 10 days have passed since symptoms first appeared and  2. At least 24 hours have passed since last fever without the use of fever-reducing medications and  3.  Symptoms (e.g., cough, shortness of breath) have improved    Sincerely,          Nicolas Escobar MD

## 2021-07-23 NOTE — ED PROVIDER NOTES
EMERGENCY DEPARTMENT HISTORY AND PHYSICAL EXAM      Date: 7/23/2021  Patient Name: Gilberto Asher    History of Presenting Illness     Chief Complaint   Patient presents with    Cold Symptoms    Ear Pain       History Provided By: Patient    HPI: Gilberto Asher, 27 y.o. female with a past medical history significant No significant past medical history presents to the ED with chief complaint of Cold Symptoms and Ear Pain  . 69-year-old female with a cold symptoms since Tuesday. Also nasal congestion slight cough no shortness of breath. Low-grade fevers. Sore throat. Started with bilateral ear discomfort today. That she may have an ear infection. Similar presentation about a month ago received antibiotics. Did not take them all completely so took the last 2 today. That helped her ear discomfort. Wants to see if she needs more antibiotics. No exposure to anyone with COVID-19. No vaccine for COVID-19. There are no other complaints, changes, or physical findings at this time. PCP: Shawn Patterson NP    Current Outpatient Medications   Medication Sig Dispense Refill    azithromycin (Zithromax Z-Stephan) 250 mg tablet Take 2 tablet p.o. day 1 then 1 tablet p.o. day 2 through 5 6 Tablet 0    albuterol (PROVENTIL HFA, VENTOLIN HFA, PROAIR HFA) 90 mcg/actuation inhaler Take 2 Puffs by inhalation every four (4) hours as needed for Wheezing. 1 Inhaler 0    dextromethorphan-guaiFENesin (Robitussin Cough-Chest Uriel DM) 5-100 mg/5 mL liqd Take 5 mL by mouth every six (6) hours. 200 mL 0    sodium chloride (Ayr Saline) 0.65 % drop 2 Drops by Both Nostrils route every two (2) hours as needed for Congestion. 15 mL 0    fluticasone propionate (FLONASE) 50 mcg/actuation nasal spray 2 Sprays by Both Nostrils route daily. 1 Bottle 0    albuterol (Ventolin HFA) 90 mcg/actuation inhaler Take 2 Puffs by inhalation every four (4) hours as needed for Wheezing, Shortness of Breath or Cough.  1 Inhaler 0    predniSONE (DELTASONE) 20 mg tablet 3 Tabs for 3 days 2 tabs for 3 days 1 tab for 3 days 18 Tab 0       Past History     Past Medical History:  Past Medical History:   Diagnosis Date    Asthma        Past Surgical History:  No past surgical history on file. Family History:  History reviewed. No pertinent family history. Social History:  Social History     Tobacco Use    Smoking status: Former Smoker     Quit date: 2020     Years since quittin.0    Smokeless tobacco: Never Used   Substance Use Topics    Alcohol use: Never    Drug use: Yes     Types: Marijuana       Allergies:  No Known Allergies      Review of Systems   Review of Systems   Constitutional: Positive for chills and fatigue. Negative for diaphoresis. HENT: Positive for congestion and sore throat. Negative for ear pain and nosebleeds. Eyes: Negative. Negative for pain, discharge and redness. Respiratory: Positive for cough and shortness of breath. Negative for chest tightness and wheezing. Cardiovascular: Negative. Negative for chest pain, palpitations and leg swelling. Gastrointestinal: Negative. Negative for abdominal pain, constipation, diarrhea, nausea and vomiting. Endocrine: Negative. Negative for cold intolerance. Genitourinary: Negative. Negative for difficulty urinating, dysuria and hematuria. Musculoskeletal: Negative. Negative for arthralgias, joint swelling and neck pain. Skin: Negative. Negative for color change, pallor, rash and wound. Allergic/Immunologic: Negative. Neurological: Negative. Negative for dizziness, syncope, weakness, light-headedness and headaches. Hematological: Negative. Does not bruise/bleed easily. Psychiatric/Behavioral: Negative. Negative for behavioral problems, confusion and suicidal ideas. All other systems reviewed and are negative. Physical Exam   Physical Exam  Vitals and nursing note reviewed. Exam conducted with a chaperone present. Constitutional:       Appearance: Normal appearance. She is normal weight. HENT:      Head: Normocephalic and atraumatic. Nose: Nose normal.      Mouth/Throat:      Mouth: Mucous membranes are moist.      Pharynx: Oropharynx is clear. Eyes:      Extraocular Movements: Extraocular movements intact. Conjunctiva/sclera: Conjunctivae normal.      Pupils: Pupils are equal, round, and reactive to light. Cardiovascular:      Rate and Rhythm: Normal rate and regular rhythm. Pulses: Normal pulses. Heart sounds: Normal heart sounds. Pulmonary:      Effort: Pulmonary effort is normal. No respiratory distress. Breath sounds: Normal breath sounds. Abdominal:      General: Abdomen is flat. Bowel sounds are normal. There is no distension. Palpations: Abdomen is soft. Tenderness: There is no abdominal tenderness. There is no guarding. Musculoskeletal:         General: No swelling, tenderness, deformity or signs of injury. Normal range of motion. Cervical back: Normal range of motion and neck supple. Right lower leg: No edema. Left lower leg: No edema. Skin:     General: Skin is warm and dry. Capillary Refill: Capillary refill takes less than 2 seconds. Findings: No lesion or rash. Neurological:      General: No focal deficit present. Mental Status: She is alert and oriented to person, place, and time. Mental status is at baseline. Cranial Nerves: No cranial nerve deficit. Psychiatric:         Mood and Affect: Mood normal.         Behavior: Behavior normal.         Thought Content: Thought content normal.         Judgment: Judgment normal.         Diagnostic Study Results     Labs -   No results found for this or any previous visit (from the past 12 hour(s)).     Radiologic Studies -   No orders to display     CT Results  (Last 48 hours)    None        CXR Results  (Last 48 hours)    None          Medical Decision Making and ED Course   I am the first provider for this patient. I reviewed the vital signs, available nursing notes, past medical history, past surgical history, family history and social history. Vital Signs-Reviewed the patient's vital signs. Patient Vitals for the past 12 hrs:   Temp Pulse Resp BP SpO2   07/23/21 1048 99 °F (37.2 °C) 82 18 119/67 97 %       EKG interpretation:         Records Reviewed: Previous Hospital chart. EMS run report      ED Course:   Initial assessment performed. The patients presenting problems have been discussed, and they are in agreement with the care plan formulated and outlined with them. I have encouraged them to ask questions as they arise throughout their visit. Orders Placed This Encounter    SARS-COV-2     Standing Status:   Standing     Number of Occurrences:   1     Order Specific Question:   Specimen source     Answer:   NASOPHARYNGEAL SWAB [650]     Order Specific Question:   Is this test for diagnosis or screening? Answer:   Diagnosis of ill patient     Order Specific Question:   Symptomatic for COVID-19 as defined by CDC? Answer:   Yes     Order Specific Question:   Date of Symptom Onset     Answer:   7/20/2021     Order Specific Question:   Hospitalized for COVID-19? Answer:   No     Order Specific Question:   Admitted to ICU for COVID-19? Answer:   No     Order Specific Question:   Employed in healthcare setting? Answer:   No     Order Specific Question:   Resident in a congregate (group) care setting? Answer:   No     Order Specific Question:   Pregnant? Answer:   Unknown     Order Specific Question:   Previously tested for COVID-19? Answer:    Yes    azithromycin (Zithromax Z-Stephan) 250 mg tablet     Sig: Take 2 tablet p.o. day 1 then 1 tablet p.o. day 2 through 5     Dispense:  6 Tablet     Refill:  0    albuterol (PROVENTIL HFA, VENTOLIN HFA, PROAIR HFA) 90 mcg/actuation inhaler     Sig: Take 2 Puffs by inhalation every four (4) hours as needed for Wheezing. Dispense:  1 Inhaler     Refill:  0    dextromethorphan-guaiFENesin (Robitussin Cough-Chest Uriel DM) 5-100 mg/5 mL liqd     Sig: Take 5 mL by mouth every six (6) hours. Dispense:  200 mL     Refill:  0              CONSULTANTS:  Consults      Provider Notes (Medical Decision Making):   42-year-old female well-appearing stable vitals with upper respiratory symptoms. Benign exam.  Will evaluate for COVID-19 with a swab. Patient is stable for discharge home. Symptomatic relief. Also treatment for sinusitis.    -------------------------------------------------------------------------------------  COVID-19 Risk of decompensation within 24 hours:    Clinical risk score:   CHF,COPD, age >57 (+2 points each)   0  CXR (moderate nonlobar +1, 1 lobe +2, bilat severe +2) 0  HR > 100 at disposition (+2 points)    0  O2 sats <92% RA (+4 points)     0  RR >20 (+2 points)      0    TOTAL SCORE 0  - SCORE 0-2: Discharged home with routine follow-up  - SCORE 3-4: Discharged home with pulse oximeter or 24-hour follow-up  - SCORE 0-4: Consider aspirin 81 mg p.o. daily for 2 weeks if not contraindicated or allergy  - SCORE 5-8: Consider chest x-ray CBC CMP troponin and lactate. If troponin and lactate are normal go to low risk. If troponin or D-dimer are lactate are elevated go to high risk  - SCORE 9+: Consider admission. Decadron 6 mg IV. VTE prophylaxis, antibiotics for pneumonia. Check vitamin D levels. Limit fluids and choose pressors early. Proning. High flow nasal cannula. [Includes respiratory distress. Unstable, oxygen need for sats greater than 90% or acute delirium.]          Procedures                       Disposition       Emergency Department Disposition:  Discharged      Diagnosis     Clinical Impression:   1. Upper respiratory tract infection, unspecified type    2. Suspected COVID-19 virus infection    3.  Acute sinusitis, recurrence not specified, unspecified location Attestations:    Boston Reese MD    Please note that this dictation was completed with [employer], the computer voice recognition software. Quite often unanticipated grammatical, syntax, homophones, and other interpretive errors are inadvertently transcribed by the computer software. Please disregard these errors. Please excuse any errors that have escaped final proofreading. Thank you.

## 2021-07-25 LAB — SARS-COV-2, COV2NT: NOT DETECTED

## 2021-08-26 ENCOUNTER — HOSPITAL ENCOUNTER (INPATIENT)
Age: 31
LOS: 4 days | Discharge: HOME HEALTH | DRG: 045 | End: 2021-08-30
Attending: STUDENT IN AN ORGANIZED HEALTH CARE EDUCATION/TRAINING PROGRAM | Admitting: FAMILY MEDICINE
Payer: COMMERCIAL

## 2021-08-26 ENCOUNTER — APPOINTMENT (OUTPATIENT)
Dept: CT IMAGING | Age: 31
DRG: 045 | End: 2021-08-26
Attending: EMERGENCY MEDICINE
Payer: COMMERCIAL

## 2021-08-26 ENCOUNTER — APPOINTMENT (OUTPATIENT)
Dept: CT IMAGING | Age: 31
DRG: 045 | End: 2021-08-26
Attending: STUDENT IN AN ORGANIZED HEALTH CARE EDUCATION/TRAINING PROGRAM
Payer: COMMERCIAL

## 2021-08-26 DIAGNOSIS — R51.9 ACUTE NONINTRACTABLE HEADACHE, UNSPECIFIED HEADACHE TYPE: ICD-10-CM

## 2021-08-26 DIAGNOSIS — R03.0 ELEVATED BLOOD PRESSURE READING: ICD-10-CM

## 2021-08-26 DIAGNOSIS — R53.1 LEFT-SIDED WEAKNESS: Primary | ICD-10-CM

## 2021-08-26 PROBLEM — I63.9 CVA (CEREBRAL VASCULAR ACCIDENT) (HCC): Status: ACTIVE | Noted: 2021-08-26

## 2021-08-26 LAB
ALBUMIN SERPL-MCNC: 3.2 G/DL (ref 3.5–5)
ALBUMIN/GLOB SERPL: 0.8 {RATIO} (ref 1.1–2.2)
ALP SERPL-CCNC: 37 U/L (ref 45–117)
ALT SERPL-CCNC: 18 U/L (ref 12–78)
AMPHET UR QL SCN: NEGATIVE
ANION GAP SERPL CALC-SCNC: 3 MMOL/L (ref 5–15)
APTT PPP: 29.8 SEC (ref 21.2–34.1)
AST SERPL W P-5'-P-CCNC: 11 U/L (ref 15–37)
BARBITURATES UR QL SCN: POSITIVE
BASOPHILS # BLD: 0.1 K/UL (ref 0–0.1)
BASOPHILS NFR BLD: 1 % (ref 0–1)
BENZODIAZ UR QL: NEGATIVE
BILIRUB SERPL-MCNC: 0.3 MG/DL (ref 0.2–1)
BUN SERPL-MCNC: 13 MG/DL (ref 6–20)
BUN/CREAT SERPL: 17 (ref 12–20)
CA-I BLD-MCNC: 8.9 MG/DL (ref 8.5–10.1)
CANNABINOIDS UR QL SCN: POSITIVE
CHLORIDE SERPL-SCNC: 106 MMOL/L (ref 97–108)
CO2 SERPL-SCNC: 30 MMOL/L (ref 21–32)
COCAINE UR QL SCN: NEGATIVE
CREAT SERPL-MCNC: 0.76 MG/DL (ref 0.55–1.02)
CRP SERPL-MCNC: 1.52 MG/DL (ref 0–0.6)
DIFFERENTIAL METHOD BLD: NORMAL
DRUG SCRN COMMENT,DRGCM: ABNORMAL
EOSINOPHIL # BLD: 0.3 K/UL (ref 0–0.4)
EOSINOPHIL NFR BLD: 3 % (ref 0–7)
ERYTHROCYTE [DISTWIDTH] IN BLOOD BY AUTOMATED COUNT: 13.4 % (ref 11.5–14.5)
ERYTHROCYTE [SEDIMENTATION RATE] IN BLOOD: 44 MM/HR
GLOBULIN SER CALC-MCNC: 4.2 G/DL (ref 2–4)
GLUCOSE SERPL-MCNC: 86 MG/DL (ref 65–100)
HCT VFR BLD AUTO: 36.4 % (ref 35–47)
HGB BLD-MCNC: 11.7 G/DL (ref 11.5–16)
IMM GRANULOCYTES # BLD AUTO: 0 K/UL (ref 0–0.04)
IMM GRANULOCYTES NFR BLD AUTO: 0 % (ref 0–0.5)
INR PPP: 1.1 (ref 0.9–1.1)
LYMPHOCYTES # BLD: 2.9 K/UL (ref 0.8–3.5)
LYMPHOCYTES NFR BLD: 29 % (ref 12–49)
MCH RBC QN AUTO: 28.5 PG (ref 26–34)
MCHC RBC AUTO-ENTMCNC: 32.1 G/DL (ref 30–36.5)
MCV RBC AUTO: 88.6 FL (ref 80–99)
METHADONE UR QL: NEGATIVE
MONOCYTES # BLD: 0.9 K/UL (ref 0–1)
MONOCYTES NFR BLD: 9 % (ref 5–13)
NEUTS SEG # BLD: 5.9 K/UL (ref 1.8–8)
NEUTS SEG NFR BLD: 58 % (ref 32–75)
NRBC # BLD: 0 K/UL (ref 0–0.01)
NRBC BLD-RTO: 0 PER 100 WBC
OPIATES UR QL: NEGATIVE
PCP UR QL: NEGATIVE
PLATELET # BLD AUTO: 360 K/UL (ref 150–400)
PMV BLD AUTO: 10 FL (ref 8.9–12.9)
POTASSIUM SERPL-SCNC: 3.9 MMOL/L (ref 3.5–5.1)
PROT SERPL-MCNC: 7.4 G/DL (ref 6.4–8.2)
PROTHROMBIN TIME: 13.6 SEC (ref 11.9–14.7)
RBC # BLD AUTO: 4.11 M/UL (ref 3.8–5.2)
SODIUM SERPL-SCNC: 139 MMOL/L (ref 136–145)
THERAPEUTIC RANGE,PTTT: NORMAL SEC (ref 82–109)
TROPONIN I SERPL-MCNC: <0.05 NG/ML
WBC # BLD AUTO: 10 K/UL (ref 3.6–11)

## 2021-08-26 PROCEDURE — 99285 EMERGENCY DEPT VISIT HI MDM: CPT

## 2021-08-26 PROCEDURE — 84484 ASSAY OF TROPONIN QUANT: CPT

## 2021-08-26 PROCEDURE — 74011250637 HC RX REV CODE- 250/637: Performed by: STUDENT IN AN ORGANIZED HEALTH CARE EDUCATION/TRAINING PROGRAM

## 2021-08-26 PROCEDURE — 80307 DRUG TEST PRSMV CHEM ANLYZR: CPT

## 2021-08-26 PROCEDURE — 85652 RBC SED RATE AUTOMATED: CPT

## 2021-08-26 PROCEDURE — 93005 ELECTROCARDIOGRAM TRACING: CPT

## 2021-08-26 PROCEDURE — 74011000636 HC RX REV CODE- 636: Performed by: STUDENT IN AN ORGANIZED HEALTH CARE EDUCATION/TRAINING PROGRAM

## 2021-08-26 PROCEDURE — 85610 PROTHROMBIN TIME: CPT

## 2021-08-26 PROCEDURE — 86140 C-REACTIVE PROTEIN: CPT

## 2021-08-26 PROCEDURE — 65270000029 HC RM PRIVATE

## 2021-08-26 PROCEDURE — 85730 THROMBOPLASTIN TIME PARTIAL: CPT

## 2021-08-26 PROCEDURE — 80053 COMPREHEN METABOLIC PANEL: CPT

## 2021-08-26 PROCEDURE — 70496 CT ANGIOGRAPHY HEAD: CPT

## 2021-08-26 PROCEDURE — 36415 COLL VENOUS BLD VENIPUNCTURE: CPT

## 2021-08-26 PROCEDURE — 85025 COMPLETE CBC W/AUTO DIFF WBC: CPT

## 2021-08-26 PROCEDURE — 70450 CT HEAD/BRAIN W/O DYE: CPT

## 2021-08-26 RX ORDER — ONDANSETRON 2 MG/ML
4 INJECTION INTRAMUSCULAR; INTRAVENOUS
Status: DISCONTINUED | OUTPATIENT
Start: 2021-08-26 | End: 2021-08-30 | Stop reason: HOSPADM

## 2021-08-26 RX ORDER — SODIUM CHLORIDE 9 MG/ML
75 INJECTION, SOLUTION INTRAVENOUS CONTINUOUS
Status: DISCONTINUED | OUTPATIENT
Start: 2021-08-26 | End: 2021-08-30 | Stop reason: HOSPADM

## 2021-08-26 RX ORDER — FLUTICASONE PROPIONATE 50 MCG
2 SPRAY, SUSPENSION (ML) NASAL DAILY
Status: DISCONTINUED | OUTPATIENT
Start: 2021-08-27 | End: 2021-08-30 | Stop reason: HOSPADM

## 2021-08-26 RX ORDER — ATORVASTATIN CALCIUM 40 MG/1
40 TABLET, FILM COATED ORAL
Status: DISCONTINUED | OUTPATIENT
Start: 2021-08-26 | End: 2021-08-30 | Stop reason: HOSPADM

## 2021-08-26 RX ORDER — POLYETHYLENE GLYCOL 3350 17 G/17G
17 POWDER, FOR SOLUTION ORAL DAILY PRN
Status: DISCONTINUED | OUTPATIENT
Start: 2021-08-26 | End: 2021-08-30 | Stop reason: HOSPADM

## 2021-08-26 RX ORDER — BUTALBITAL, ACETAMINOPHEN AND CAFFEINE 50; 325; 40 MG/1; MG/1; MG/1
1 TABLET ORAL
Status: COMPLETED | OUTPATIENT
Start: 2021-08-26 | End: 2021-08-26

## 2021-08-26 RX ORDER — ACETAMINOPHEN 650 MG/1
650 SUPPOSITORY RECTAL
Status: DISCONTINUED | OUTPATIENT
Start: 2021-08-26 | End: 2021-08-27

## 2021-08-26 RX ORDER — ALBUTEROL SULFATE 90 UG/1
2 AEROSOL, METERED RESPIRATORY (INHALATION)
Status: DISCONTINUED | OUTPATIENT
Start: 2021-08-26 | End: 2021-08-30 | Stop reason: HOSPADM

## 2021-08-26 RX ORDER — ONDANSETRON 4 MG/1
4 TABLET, ORALLY DISINTEGRATING ORAL
Status: DISCONTINUED | OUTPATIENT
Start: 2021-08-26 | End: 2021-08-30 | Stop reason: HOSPADM

## 2021-08-26 RX ORDER — ENOXAPARIN SODIUM 100 MG/ML
40 INJECTION SUBCUTANEOUS DAILY
Status: DISCONTINUED | OUTPATIENT
Start: 2021-08-27 | End: 2021-08-30 | Stop reason: HOSPADM

## 2021-08-26 RX ORDER — ASPIRIN 325 MG
325 TABLET ORAL DAILY
Status: DISCONTINUED | OUTPATIENT
Start: 2021-08-27 | End: 2021-08-30 | Stop reason: HOSPADM

## 2021-08-26 RX ORDER — ACETAMINOPHEN 325 MG/1
650 TABLET ORAL
Status: DISCONTINUED | OUTPATIENT
Start: 2021-08-26 | End: 2021-08-27

## 2021-08-26 RX ADMIN — BUTALBITAL, ACETAMINOPHEN, AND CAFFEINE 1 TABLET: 50; 325; 40 TABLET ORAL at 16:37

## 2021-08-26 RX ADMIN — IOPAMIDOL 100 ML: 755 INJECTION, SOLUTION INTRAVENOUS at 14:13

## 2021-08-26 RX ADMIN — BUTALBITAL, ACETAMINOPHEN, AND CAFFEINE 1 TABLET: 50; 325; 40 TABLET ORAL at 12:29

## 2021-08-26 NOTE — ED TRIAGE NOTES
Patient complains of headache for 2 weeks and body aches bilateral earache that started last night complains that light and sound are bothering her.  Says her left arm is weaker than her right says her last known normal was 1230am

## 2021-08-26 NOTE — ED PROVIDER NOTES
EMERGENCY DEPARTMENT HISTORY AND PHYSICAL EXAM      Date: 8/26/2021  Patient Name: Patsy Patterson      History of Presenting Illness     No chief complaint on file. History Provided By: Patient    HPI: Patsy Patterson, 27 y.o. female with PMH of asthma, obesity, and chronic smoking presents emerged apartment with 2 weeks of headache. Patient reports that her headache is frontal, throbbing, nonradiating, no clear aggravating or relieving factors with no photophobia phonophobia. Patient notes that yesterday approximately at 12:30 AM she started experiencing weakness in left arm and left leg. This morning she woke up with persistence of the weakness which got her concerned and brought her to the emergency department. Patient did not have progression of her weakness since then. Additionally, patient is describing decreased sensation in the left side. Additionally, patient is scribing generalized body aches and left-sided neck pain. There are no other complaints, changes, or physical findings at this time. PCP: Nicholas Levin NP    Current Outpatient Medications   Medication Sig Dispense Refill    azithromycin (Zithromax Z-Stephan) 250 mg tablet Take 2 tablet p.o. day 1 then 1 tablet p.o. day 2 through 5 6 Tablet 0    albuterol (PROVENTIL HFA, VENTOLIN HFA, PROAIR HFA) 90 mcg/actuation inhaler Take 2 Puffs by inhalation every four (4) hours as needed for Wheezing. 1 Inhaler 0    dextromethorphan-guaiFENesin (Robitussin Cough-Chest Uriel DM) 5-100 mg/5 mL liqd Take 5 mL by mouth every six (6) hours. 200 mL 0    sodium chloride (Ayr Saline) 0.65 % drop 2 Drops by Both Nostrils route every two (2) hours as needed for Congestion. 15 mL 0    fluticasone propionate (FLONASE) 50 mcg/actuation nasal spray 2 Sprays by Both Nostrils route daily. 1 Bottle 0    albuterol (Ventolin HFA) 90 mcg/actuation inhaler Take 2 Puffs by inhalation every four (4) hours as needed for Wheezing, Shortness of Breath or Cough.  1 Inhaler 0    predniSONE (DELTASONE) 20 mg tablet 3 Tabs for 3 days 2 tabs for 3 days 1 tab for 3 days 18 Tab 0       Past History     Past Medical History:  Past Medical History:   Diagnosis Date    Asthma        Past Surgical History:  History reviewed. No pertinent surgical history. Family History:  No family history on file. Social History:  Social History     Tobacco Use    Smoking status: Former Smoker     Quit date: 2020     Years since quittin.0    Smokeless tobacco: Never Used   Substance Use Topics    Alcohol use: Never    Drug use: Yes     Types: Marijuana       Allergies:  No Known Allergies      Review of Systems     Review of Systems   Constitutional: Negative for activity change, chills and fever. HENT: Negative for congestion and facial swelling. Eyes: Negative for discharge and visual disturbance. Respiratory: Negative for chest tightness and shortness of breath. Cardiovascular: Negative for chest pain and leg swelling. Gastrointestinal: Negative for abdominal pain, diarrhea and vomiting. Genitourinary: Negative for dysuria and flank pain. Musculoskeletal: Positive for neck pain. Negative for back pain and gait problem. Skin: Negative for rash and wound. Neurological: Positive for weakness, numbness and headaches. Negative for dizziness. Physical Exam     Physical Exam  Vitals and nursing note reviewed. Constitutional:       General: She is not in acute distress. Appearance: She is obese. She is not ill-appearing, toxic-appearing or diaphoretic. HENT:      Head: Normocephalic. Mouth/Throat:      Mouth: Mucous membranes are moist.      Pharynx: Oropharynx is clear. Eyes:      Extraocular Movements: Extraocular movements intact. Conjunctiva/sclera: Conjunctivae normal.   Cardiovascular:      Rate and Rhythm: Normal rate and regular rhythm. Pulmonary:      Effort: Pulmonary effort is normal.      Breath sounds: Normal breath sounds. Abdominal:      General: Abdomen is flat. Tenderness: There is no abdominal tenderness. Musculoskeletal:      Cervical back: Normal range of motion and neck supple. No rigidity or tenderness. Skin:     General: Skin is warm and dry. Neurological:      Mental Status: She is alert and oriented to person, place, and time. Cranial Nerves: No cranial nerve deficit. Sensory: Sensory deficit ( Mild sensory deficit in the left) present. Motor: Weakness ( Left-sided weakness in the upper extremity more profoundly in the lower extremity) present. Coordination: Coordination normal.          Lab and Diagnostic Study Results     Labs -     Recent Results (from the past 12 hour(s))   CBC WITH AUTOMATED DIFF    Collection Time: 08/26/21 12:30 PM   Result Value Ref Range    WBC 10.0 3.6 - 11.0 K/uL    RBC 4.11 3.80 - 5.20 M/uL    HGB 11.7 11.5 - 16.0 g/dL    HCT 36.4 35.0 - 47.0 %    MCV 88.6 80.0 - 99.0 FL    MCH 28.5 26.0 - 34.0 PG    MCHC 32.1 30.0 - 36.5 g/dL    RDW 13.4 11.5 - 14.5 %    PLATELET 660 595 - 811 K/uL    MPV 10.0 8.9 - 12.9 FL    NRBC 0.0 0.0  WBC    ABSOLUTE NRBC 0.00 0.00 - 0.01 K/uL    NEUTROPHILS 58 32 - 75 %    LYMPHOCYTES 29 12 - 49 %    MONOCYTES 9 5 - 13 %    EOSINOPHILS 3 0 - 7 %    BASOPHILS 1 0 - 1 %    IMMATURE GRANULOCYTES 0 0 - 0.5 %    ABS. NEUTROPHILS 5.9 1.8 - 8.0 K/UL    ABS. LYMPHOCYTES 2.9 0.8 - 3.5 K/UL    ABS. MONOCYTES 0.9 0.0 - 1.0 K/UL    ABS. EOSINOPHILS 0.3 0.0 - 0.4 K/UL    ABS. BASOPHILS 0.1 0.0 - 0.1 K/UL    ABS. IMM.  GRANS. 0.0 0.00 - 0.04 K/UL    DF AUTOMATED     METABOLIC PANEL, COMPREHENSIVE    Collection Time: 08/26/21 12:30 PM   Result Value Ref Range    Sodium 139 136 - 145 mmol/L    Potassium 3.9 3.5 - 5.1 mmol/L    Chloride 106 97 - 108 mmol/L    CO2 30 21 - 32 mmol/L    Anion gap 3 (L) 5 - 15 mmol/L    Glucose 86 65 - 100 mg/dL    BUN 13 6 - 20 mg/dL    Creatinine 0.76 0.55 - 1.02 mg/dL    BUN/Creatinine ratio 17 12 - 20      GFR est AA >60 >60 ml/min/1.73m2    GFR est non-AA >60 >60 ml/min/1.73m2    Calcium 8.9 8.5 - 10.1 mg/dL    Bilirubin, total 0.3 0.2 - 1.0 mg/dL    AST (SGOT) 11 (L) 15 - 37 U/L    ALT (SGPT) 18 12 - 78 U/L    Alk. phosphatase 37 (L) 45 - 117 U/L    Protein, total 7.4 6.4 - 8.2 g/dL    Albumin 3.2 (L) 3.5 - 5.0 g/dL    Globulin 4.2 (H) 2.0 - 4.0 g/dL    A-G Ratio 0.8 (L) 1.1 - 2.2     PROTHROMBIN TIME + INR    Collection Time: 08/26/21 12:30 PM   Result Value Ref Range    Prothrombin time 13.6 11.9 - 14.7 sec    INR 1.1 0.9 - 1.1     PTT    Collection Time: 08/26/21 12:30 PM   Result Value Ref Range    aPTT 29.8 21.2 - 34.1 sec    aPTT, therapeutic range   82 - 109 sec   TROPONIN I    Collection Time: 08/26/21 12:30 PM   Result Value Ref Range    Troponin-I, Qt. <0.05 <0.05 ng/mL   C REACTIVE PROTEIN, QT    Collection Time: 08/26/21 12:30 PM   Result Value Ref Range    C-Reactive protein 1.52 (H) 0.00 - 0.60 mg/dL   SED RATE (ESR)    Collection Time: 08/26/21 12:30 PM   Result Value Ref Range    Sed rate, automated 44 mm/hr       Radiologic Studies -   [unfilled]  CT Results  (Last 48 hours)               08/26/21 1414  CTA CODE NEURO HEAD AND NECK W CONT Final result    Impression:  No occlusion or high-grade stenosis of the major cervical or intracranial   arterial vasculature. Please note that degrees of carotid stenosis are measured in accordance with   NASCET criteria. Report sent to the referring ER at the time of dictation. Narrative:  Study: Head and Neck CTA       Clinical Indication: Weakness. Comparison: Head CT performed earlier the same day. Technique: Routine unenhanced axial acquisition of the neck was performed. Subsequently, contiguous thin section axial acquisition of the head and neck was   performed in the arterial phase from the thoracic inlet to the skull vertex   following the intravenous administration of 100 mL Isovue-370.  Coronal,   sagittal, and MIP reconstructions were generated and reviewed. Dose reduction:   All CT scans at this facility are performed using dose reduction optimization   techniques as appropriate to a performed exam including the following-automated   exposure control, adjustments of mA and/or Kv according to patient size, or use   of iterative reconstructive technique. Findings:       Neck CTA:   The aortic arch and great vessel origins are unremarkable. The common carotid arteries are patent without high-grade stenosis. The carotid   bifurcations are unremarkable. The cervical internal carotid arteries are patent   without high-grade stenosis based on NASCET criteria. The external carotid   arteries are unremarkable. The cervical vertebral arteries are patent without high-grade stenosis. The paraspinal soft tissues are unremarkable. The lung apices are clear. No   destructive osseous lesion. Head CTA:   The intracranial internal carotid arteries are patent without high-grade   stenosis. Hypoplastic left A1 segment, a normal anatomic variant. Otherwise the   anterior cerebral arteries are patent without high-grade stenosis. The middle   cerebral arteries are patent without high-grade stenosis. The anterior and   bilateral posterior communicating arteries are visualized. The intracranial vertebral arteries are patent without high-grade stenosis. The   basilar artery is patent without high-grade stenosis. The posterior cerebral   arteries are patent without high-grade stenosis. No aneurysm or high flow vascular malformation. 08/26/21 1159  CT CODE NEURO HEAD WO CONTRAST Final result    Impression:  1. No acute large vessel intracranial ischemia, hemorrhage. Called report to Dr. Aj  at 12:07 PM 8/26/2021. Narrative:  Stroke alert. Emergency Department triage note headache for 2 weeks, body aches,   earache, left arm weaker than right.  Last known normal 12:30 AM.       No comparison. Technique: Axial images  head without IV contrast. Multiplanar reformatting   performed. Dose reduction: All CT scans at this facility are performed using dose reduction   optimization techniques as appropriate to a performed exam including the   following: Automated exposure control, adjustments of the mA and/or kV according   to patient's size, or use of iterative reconstruction technique. Findings: No abnormal brain density. No mass effect, extra-axial fluid   collection, hemorrhage. CSF-containing structures normal size, shape, position. No calvarial fractures. Included facial sinuses and mastoid air cells and middle   ears are unopacified. CXR Results  (Last 48 hours)    None          Medical Decision Making and ED Course   - I am the first and primary provider for this patient AND AM THE PRIMARY PROVIDER OF RECORD. - I reviewed the vital signs, available nursing notes, past medical history, past surgical history, family history and social history. - Initial assessment performed. The patients presenting problems have been discussed, and the staff are in agreement with the care plan formulated and outlined with them. I have encouraged them to ask questions as they arise throughout their visit. Vital Signs-Reviewed the patient's vital signs. Patient Vitals for the past 12 hrs:   Temp Pulse Resp BP SpO2   08/26/21 1519 98.2 °F (36.8 °C) 69 16 (!) 136/96 98 %   08/26/21 1423 98.6 °F (37 °C) 64 20 133/83 98 %   08/26/21 1312  (!) 54 20 (!) 167/112 99 %   08/26/21 1219     100 %   08/26/21 1211  64 20 (!) 148/105 99 %   08/26/21 1208  68 20 (!) 148/105 100 %   08/26/21 1136 98.4 °F (36.9 °C) 64 16 (!) 166/88 98 %         Records Reviewed: Nursing Notes    Provider Notes (Medical Decision Making):   34-year-old female presents left-sided weakness concerning for stroke. Patient sensation does not appear to be secondary to a stroke mimic.   Patient is outside the window for TPA. Will get CT and CTA of head and neck and get the neurologist involved in the patient care. ED Course:   Patient work-up was reviewed. CBC chemistry with acceptable range, and CT of the head without contrast did not show bleeding. This was followed by CTA of head and neck which were unremarkable. The neurologist requested ESR and CRP which showed normal ESR and CRP is elevated. We will touch base with neurology regarding these values. Nonetheless, the patient will need to be admitted for stroke work-up. Disposition     Disposition: Condition stable  Admitted to Floor Medical Floor the case was discussed with the admitting physician Dr. Londa Aschoff      Diagnosis     Clinical Impression:   1. Left-sided weakness    2. Elevated blood pressure reading    3. Acute nonintractable headache, unspecified headache type        Attestations: Jhony hSerman MD    Please note that this dictation was completed with [employer], the computer voice recognition software. Quite often unanticipated grammatical, syntax, homophones, and other interpretive errors are inadvertently transcribed by the computer software. Please disregard these errors. Please excuse any errors that have escaped final proofreading. Thank you.

## 2021-08-27 ENCOUNTER — APPOINTMENT (OUTPATIENT)
Dept: NON INVASIVE DIAGNOSTICS | Age: 31
DRG: 045 | End: 2021-08-27
Attending: FAMILY MEDICINE
Payer: COMMERCIAL

## 2021-08-27 LAB
ALBUMIN SERPL-MCNC: 2.7 G/DL (ref 3.5–5)
ALBUMIN/GLOB SERPL: 0.7 {RATIO} (ref 1.1–2.2)
ALP SERPL-CCNC: 32 U/L (ref 45–117)
ALT SERPL-CCNC: 14 U/L (ref 12–78)
ANION GAP SERPL CALC-SCNC: 7 MMOL/L (ref 5–15)
AST SERPL W P-5'-P-CCNC: 8 U/L (ref 15–37)
BASOPHILS # BLD: 0 K/UL (ref 0–0.1)
BASOPHILS NFR BLD: 1 % (ref 0–1)
BILIRUB SERPL-MCNC: 0.3 MG/DL (ref 0.2–1)
BUN SERPL-MCNC: 13 MG/DL (ref 6–20)
BUN/CREAT SERPL: 22 (ref 12–20)
CA-I BLD-MCNC: 8.4 MG/DL (ref 8.5–10.1)
CHLORIDE SERPL-SCNC: 106 MMOL/L (ref 97–108)
CO2 SERPL-SCNC: 26 MMOL/L (ref 21–32)
CREAT SERPL-MCNC: 0.6 MG/DL (ref 0.55–1.02)
DIFFERENTIAL METHOD BLD: ABNORMAL
ECHO AO ROOT DIAM: 3.1 CM
ECHO AV PEAK GRADIENT: 6 MMHG
ECHO AV PEAK VELOCITY: 124 CM/S
ECHO EST RA PRESSURE: 8 MMHG
ECHO LA AREA 4C: 15.39 CM2
ECHO LA MAJOR AXIS: 3.1 CM
ECHO LA MINOR AXIS: 1.28 CM
ECHO LV E' SEPTAL VELOCITY: 9.36 CM/S
ECHO LV EDV A2C: 105 CM3
ECHO LV EJECTION FRACTION BIPLANE: 57.9 % (ref 55–100)
ECHO LV ESV A2C: 32.2 CM3
ECHO LV INTERNAL DIMENSION DIASTOLIC: 4.72 CM (ref 3.9–5.3)
ECHO LV INTERNAL DIMENSION SYSTOLIC: 3.18 CM
ECHO LV IVSD: 1.05 CM (ref 0.6–0.9)
ECHO LV MASS 2D: 136.4 G (ref 67–162)
ECHO LV MASS INDEX 2D: 56.4 G/M2 (ref 43–95)
ECHO LV POSTERIOR WALL DIASTOLIC: 0.68 CM (ref 0.6–0.9)
ECHO LVOT PEAK GRADIENT: 4 MMHG
ECHO LVOT PEAK VELOCITY: 103 CM/S
ECHO MV A VELOCITY: 56.8 CM/S
ECHO MV AREA PHT: 3.93 CM2
ECHO MV E DECELERATION TIME (DT): 218 MS
ECHO MV E VELOCITY: 83.9 CM/S
ECHO MV E/A RATIO: 1.48
ECHO MV E/E' SEPTAL: 8.96
ECHO MV PRESSURE HALF TIME (PHT): 56 MS
ECHO PV MAX VELOCITY: 78.4 CM/S
ECHO PV PEAK INSTANTANEOUS GRADIENT SYSTOLIC: 2 MMHG
ECHO PV PEAK INSTANTANEOUS GRADIENT SYSTOLIC: 2 MMHG
ECHO PV REGURGITANT MAX VELOCITY: 72.4 CM/S
ECHO PVEIN A DURATION: 95 MS
ECHO PVEIN A VELOCITY: 23.4 CM/S
ECHO RIGHT VENTRICULAR SYSTOLIC PRESSURE (RVSP): 29 MMHG
ECHO RV INTERNAL DIMENSION: 3.15 CM
ECHO TV MEAN GRADIENT: 21 MMHG
ECHO TV REGURGITANT MAX VELOCITY: 227 CM/S
EOSINOPHIL # BLD: 0.2 K/UL (ref 0–0.4)
EOSINOPHIL NFR BLD: 3 % (ref 0–7)
ERYTHROCYTE [DISTWIDTH] IN BLOOD BY AUTOMATED COUNT: 13.4 % (ref 11.5–14.5)
GLOBULIN SER CALC-MCNC: 3.8 G/DL (ref 2–4)
GLUCOSE SERPL-MCNC: 81 MG/DL (ref 65–100)
HCT VFR BLD AUTO: 33.8 % (ref 35–47)
HGB BLD-MCNC: 10.8 G/DL (ref 11.5–16)
IMM GRANULOCYTES # BLD AUTO: 0 K/UL (ref 0–0.04)
IMM GRANULOCYTES NFR BLD AUTO: 0 % (ref 0–0.5)
LEFT CCA DIST DIAS: 22 CM/S
LEFT CCA DIST SYS: 99.7 CM/S
LEFT CCA PROX DIAS: 17.2 CM/S
LEFT CCA PROX SYS: 99.3 CM/S
LEFT ECA DIAS: 15.1 CM/S
LEFT ECA SYS: 83.9 CM/S
LEFT ICA DIST DIAS: 48.1 CM/S
LEFT ICA DIST SYS: 80.2 CM/S
LEFT ICA PROX DIAS: 18.3 CM/S
LEFT ICA PROX SYS: 93.2 CM/S
LEFT SUBCLAVIAN DIAS: 13.4 CM/S
LEFT SUBCLAVIAN SYS: 133 CM/S
LEFT VERTEBRAL DIAS: 21.3 CM/S
LEFT VERTEBRAL SYS: 64.6 CM/S
LYMPHOCYTES # BLD: 2.8 K/UL (ref 0.8–3.5)
LYMPHOCYTES NFR BLD: 37 % (ref 12–49)
MCH RBC QN AUTO: 28.6 PG (ref 26–34)
MCHC RBC AUTO-ENTMCNC: 32 G/DL (ref 30–36.5)
MCV RBC AUTO: 89.4 FL (ref 80–99)
MONOCYTES # BLD: 0.6 K/UL (ref 0–1)
MONOCYTES NFR BLD: 8 % (ref 5–13)
MV DEC SLOPE: 6100 MM/S2
MV DEC SLOPE: 6100 MM/S2
NEUTS SEG # BLD: 3.9 K/UL (ref 1.8–8)
NEUTS SEG NFR BLD: 51 % (ref 32–75)
NRBC # BLD: 0 K/UL (ref 0–0.01)
NRBC BLD-RTO: 0 PER 100 WBC
PLATELET # BLD AUTO: 297 K/UL (ref 150–400)
PMV BLD AUTO: 9.8 FL (ref 8.9–12.9)
POTASSIUM SERPL-SCNC: 3.6 MMOL/L (ref 3.5–5.1)
PROT SERPL-MCNC: 6.5 G/DL (ref 6.4–8.2)
RBC # BLD AUTO: 3.78 M/UL (ref 3.8–5.2)
RIGHT CCA DIST DIAS: 19.2 CM/S
RIGHT CCA DIST SYS: 85.9 CM/S
RIGHT CCA PROX DIAS: 16.5 CM/S
RIGHT CCA PROX SYS: 100 CM/S
RIGHT ECA DIAS: 19.2 CM/S
RIGHT ECA SYS: 98.3 CM/S
RIGHT ICA DIST DIAS: 31.4 CM/S
RIGHT ICA DIST SYS: 60.9 CM/S
RIGHT ICA PROX DIAS: 28.9 CM/S
RIGHT ICA PROX SYS: 84.6 CM/S
RIGHT SUBCLAVIAN DIAS: 0 CM/S
RIGHT SUBCLAVIAN SYS: 118 CM/S
RIGHT VERTEBRAL DIAS: 15.2 CM/S
RIGHT VERTEBRAL SYS: 46.2 CM/S
SODIUM SERPL-SCNC: 139 MMOL/L (ref 136–145)
TSH SERPL DL<=0.05 MIU/L-ACNC: 2.5 UIU/ML (ref 0.36–3.74)
WBC # BLD AUTO: 7.6 K/UL (ref 3.6–11)

## 2021-08-27 PROCEDURE — 74011250636 HC RX REV CODE- 250/636: Performed by: FAMILY MEDICINE

## 2021-08-27 PROCEDURE — 97165 OT EVAL LOW COMPLEX 30 MIN: CPT

## 2021-08-27 PROCEDURE — 97530 THERAPEUTIC ACTIVITIES: CPT

## 2021-08-27 PROCEDURE — 80061 LIPID PANEL: CPT

## 2021-08-27 PROCEDURE — 95816 EEG AWAKE AND DROWSY: CPT | Performed by: PSYCHIATRY & NEUROLOGY

## 2021-08-27 PROCEDURE — 74011000258 HC RX REV CODE- 258: Performed by: PSYCHIATRY & NEUROLOGY

## 2021-08-27 PROCEDURE — 74011000250 HC RX REV CODE- 250: Performed by: PSYCHIATRY & NEUROLOGY

## 2021-08-27 PROCEDURE — 97161 PT EVAL LOW COMPLEX 20 MIN: CPT

## 2021-08-27 PROCEDURE — 84443 ASSAY THYROID STIM HORMONE: CPT

## 2021-08-27 PROCEDURE — 74011250637 HC RX REV CODE- 250/637: Performed by: PSYCHIATRY & NEUROLOGY

## 2021-08-27 PROCEDURE — 93880 EXTRACRANIAL BILAT STUDY: CPT

## 2021-08-27 PROCEDURE — 74011250637 HC RX REV CODE- 250/637: Performed by: FAMILY MEDICINE

## 2021-08-27 PROCEDURE — 65270000029 HC RM PRIVATE

## 2021-08-27 PROCEDURE — 93306 TTE W/DOPPLER COMPLETE: CPT

## 2021-08-27 PROCEDURE — 92610 EVALUATE SWALLOWING FUNCTION: CPT

## 2021-08-27 PROCEDURE — 80053 COMPREHEN METABOLIC PANEL: CPT

## 2021-08-27 PROCEDURE — 85025 COMPLETE CBC W/AUTO DIFF WBC: CPT

## 2021-08-27 RX ORDER — ACETAMINOPHEN 325 MG/1
650 TABLET ORAL
Status: DISCONTINUED | OUTPATIENT
Start: 2021-08-27 | End: 2021-08-30 | Stop reason: HOSPADM

## 2021-08-27 RX ORDER — TOPIRAMATE 25 MG/1
25 CAPSULE, COATED PELLETS ORAL EVERY 12 HOURS
Status: DISCONTINUED | OUTPATIENT
Start: 2021-08-27 | End: 2021-08-30 | Stop reason: HOSPADM

## 2021-08-27 RX ADMIN — ATORVASTATIN CALCIUM 40 MG: 40 TABLET, FILM COATED ORAL at 00:03

## 2021-08-27 RX ADMIN — ACETAMINOPHEN 650 MG: 325 TABLET ORAL at 21:01

## 2021-08-27 RX ADMIN — ACETAMINOPHEN 650 MG: 325 TABLET ORAL at 14:49

## 2021-08-27 RX ADMIN — ACETAMINOPHEN 650 MG: 325 TABLET ORAL at 00:03

## 2021-08-27 RX ADMIN — ASPIRIN 325 MG ORAL TABLET 325 MG: 325 PILL ORAL at 14:49

## 2021-08-27 RX ADMIN — ENOXAPARIN SODIUM 40 MG: 40 INJECTION SUBCUTANEOUS at 14:49

## 2021-08-27 RX ADMIN — TOPIRAMATE 25 MG: 25 CAPSULE, COATED PELLETS ORAL at 21:11

## 2021-08-27 RX ADMIN — SODIUM CHLORIDE 75 ML/HR: 9 INJECTION, SOLUTION INTRAVENOUS at 00:05

## 2021-08-27 RX ADMIN — ATORVASTATIN CALCIUM 40 MG: 40 TABLET, FILM COATED ORAL at 21:01

## 2021-08-27 RX ADMIN — SODIUM CHLORIDE 1000 MG: 9 INJECTION, SOLUTION INTRAVENOUS at 14:53

## 2021-08-27 NOTE — PROGRESS NOTES
Primary Nurse Ruben Nix LPN and Cira Chand RN performed a dual skin assessment on this patient No impairment noted  Rakan score is 17

## 2021-08-27 NOTE — CONSULTS
Consult    Patient: Ying Lam MRN: [id number]  SSN: [id number]    YOB: 1990  Age: 27 y.o. Sex: female      Subjective:      Ying Lam is a 27 y.o. female who is being seen for chest pain and bradycardia. Patient with a history of asthma, she was smokes 5 cigarettes a day, she smokes 1-2 marijuana every day. She denied alcohol use of cocaine use. She has a history of obesity. She lives in [place] [address]. She works and . She presented with a 2-week history of headaches, throbbing in nature, nonradiating. No vision change. Anyhow yesterday as she got up she started experiencing weakness of the left arm and as that subsided her left leg started getting worse and getting more weak. She has some chest pain, nonradiating without nausea, vomiting or excessive sweating. Denies any recent change in her weight but she suffers from bilateral lower limb swelling. Past Medical History:   Diagnosis Date    Asthma      History reviewed. No pertinent surgical history. No family history on file.   Social History     Tobacco Use    Smoking status: Former Smoker     Quit date: 2020     Years since quittin.0    Smokeless tobacco: Never Used   Substance Use Topics    Alcohol use: Never      Current Facility-Administered Medications   Medication Dose Route Frequency Provider Last Rate Last Admin    topiramate (TOPAMAX) sprinkle capsule 25 mg  25 mg Oral Q12H Tanwi IV, Christine Berry MD        valproate (DEPACON) 1,000 mg in 0.9% sodium chloride 50 mL IVPB  1 g IntraVENous ONCE Tanwi IV, Nida Orellana MD 50 mL/hr at 21 1453 1,000 mg at 21 1453    acetaminophen (TYLENOL) tablet 650 mg  650 mg Oral Q4H PRN Arminda Garcia MD   650 mg at 21 1449    albuterol (PROVENTIL HFA, VENTOLIN HFA, PROAIR HFA) inhaler 2 Puff  2 Puff Inhalation Q4H PRN Arminda Garcia MD        fluticasone propionate (FLONASE) 50 mcg/actuation nasal spray 2 Spray  2 Spray Both Nostrils DAILY William Talbot MD        0.9% sodium chloride infusion  75 mL/hr IntraVENous CONTINUOUS Arminda Garcia MD 75 mL/hr at 08/27/21 0005 75 mL/hr at 08/27/21 0005    polyethylene glycol (MIRALAX) packet 17 g  17 g Oral DAILY PRN Lilliam Garcia MD        ondansetron (ZOFRAN ODT) tablet 4 mg  4 mg Oral Q8H PRN Lilliam Garcia MD        Or    [facility] 4 mg  4 mg IntraVENous Q6H PRN Lilliam Garcia MD        enoxaparin (LOVENOX) injection 40 mg  40 mg SubCUTAneous DAILY Arminda Garcia MD   40 mg at 08/27/21 1449    aspirin tablet 325 mg  325 mg Oral DAILY Arminda Garcia MD   325 mg at 08/27/21 1449    atorvastatin (LIPITOR) tablet 40 mg  40 mg Oral QHS Arminda Garcia MD   40 mg at 08/27/21 0003        No Known Allergies    Review of Systems:  A comprehensive review of systems was negative except for that written in the History of Present Illness. Objective:     Vitals:    08/26/21 2052 08/26/21 2200 08/26/21 2302 08/27/21 0345   BP: 122/70 125/87 124/72 123/72   Pulse: 63 68 62 61   Resp: 20 20  18   Temp: 98.1 °F (36.7 °C) 98.2 °F (36.8 °C)     SpO2: 100% 100% 100% 99%   Weight:       Height:            Physical Exam:  General:  Alert, cooperative, no distress, appears stated age. Eyes:  Conjunctivae/corneas clear. PERRL, EOMs intact. Fundi benign   Ears:  Normal TMs and external ear canals both ears. Nose: Nares normal. Septum midline. Mucosa normal. No drainage or sinus tenderness. Mouth/Throat: Lips, mucosa, and tongue normal. Teeth and gums normal.   Neck: Supple, symmetrical, trachea midline, no adenopathy, thyroid: no enlargment/tenderness/nodules, no carotid bruit and no JVD. Back:   Symmetric, no curvature. ROM normal. No CVA tenderness. Lungs:   Clear to auscultation bilaterally. Heart:  Regular rate and rhythm, S1, S2 normal, no murmur, click, rub or gallop. Abdomen:   Soft, non-tender. Bowel sounds normal. No masses,  No organomegaly. Extremities: Extremities normal, atraumatic, no cyanosis or edema. Pulses: 2+ and symmetric all extremities. Skin: Skin color, texture, turgor normal. No rashes or lesions   Lymph nodes: Cervical, supraclavicular, and axillary nodes normal.   Neurologic: CNII-XII intact. Normal strength, sensation and reflexes throughout. Assessment:     Hospital Problems  Never Reviewed        Codes Class Noted POA    CVA (cerebral vascular accident) [facility]) ICD-10-CM: I63.9  ICD-9-CM: 434.91  8/26/2021 Unknown            Patient is a 26-year-old -American female with:  1. Chest pain, atypical for angina  2. Morbid obesity  3. Nicotine dependence  4. Marijuana use  5. Asthma  6. Headache  7. Left-sided weakness  Plan:     Echo showed EF of 55 to 60%, negative bubble study. CT angiogram of the head and neck was nonacute. 1 set of troponin is negative, C-reactive protein 1.5. Liver function test within normal limits and the kidney function within normal limit. Urine drug screen was positive for THC, barbiturate    She appears to be euvolemic. No further cardiac testing planned at this time. Currently in sinus rhythm. She was started on aspirin and atorvastatin. Neurology consulted. Further recommendation depending on clinical progression. Check lipid profile    Thank you  For involving me in Mrs. Ramo ahn. I will follow.     Signed By: Elsi Hills MD     August 27, 2021

## 2021-08-27 NOTE — PROGRESS NOTES
OCCUPATIONAL THERAPY EVALUATION  Patient: Diann Hairston (68 y.o. female)  Date: 8/27/2021  Primary Diagnosis: CVA (cerebral vascular accident) [facility]) [I63.9]        Precautions: CVA workup, Fall    ASSESSMENT  Patient is a 27year old female, who came to the ED with headache for 2 weeks, weakness in L arm and leg, decreased sensation on L side and admitted for CVA workup on 8/26/2021. CT scan of head shows no acute large vessel intracranial ischemia, hemorrhage, MRI has not been completed at this time. PMH includes: asthma, obesity, chronic smoking. Based on the objective data described below, the patient presents with LUE decreased strength and ROM, decreased sensation on LUE and LLE, decreased standing balance, impaired coordination on LUE, and increased need for A with self care and functional mobility/transfers. Patient semi supine in bed upon OT arrival and agreeable to working with therapy. Patient A&O x4 and per pt report, pt lives with mom and 16-year old son in a single story house with 1 ERIKA and no handrails. 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 Patient requesting to get up to [facility] upon OT entry, SBA roll L, min A roll R, min A sup -> sit, SBA with increased time scooting. Patient min A sit <> stand from bed and min A bed to [facility] transfer. Patient transferring with gait belt and no AD but may benefit from cane or hemiwalker. Patient min A transfer from [facility] to recliner. Once sitting in recliner patient participating in neuro testing. Patient reporting limited AROM in LUE, noted some spontaneous movement in LUE, despite weakness patient holding arm in flexion at side throughout session. Patient reports inability to feel light touch in L hand, decreased sensation in forearm, intact at shoulder all on LUE. Patient unable to participate in coordination testing on LUE.  No LUE shoulder subluxation at this time. Patient total A LE dressing sitting EOB, min A toileting, and setup A grooming while sitting in chair Patient would benefit from continued skilled OT services to address above deficits and improve safety and independence with self care and functional mobility/transfers. Recommend discharge to IRF based on presentation, when medically appropriate, as patient independent prior to admission with significant reported deficits. Current Level of Function Impacting Discharge (ADLs/self-care): setup A grooming, total A LE dressing, min A toileting. Other factors to consider for discharge: PLOF, severity of deficits, inconsistent presentation        PLAN :  Recommendations and Planned Interventions: self care training, functional mobility training, therapeutic exercise, balance training, therapeutic activities, endurance activities, patient education, home safety training and family training/education    Frequency/Duration: Patient will be followed by occupational therapy 5 times a week to address goals. Recommendation for discharge: (in order for the patient to meet his/her long term goals)  IRF    This discharge recommendation:  Has been made in collaboration with the attending provider and/or case management    IF patient discharges home will need the following DME: TBD       SUBJECTIVE:   Patient stated I miss my son, I haven't been away from him like this before.     OBJECTIVE DATA SUMMARY:   HISTORY:   Past Medical History:   Diagnosis Date    Asthma    History reviewed. No pertinent surgical history.     Expanded or extensive additional review of patient history:     Home Situation  Home Environment: Private residence  # Steps to Enter: 1  Rails to Enter: No  One/Two Story Residence: One story  Living Alone: No (lives with mom and son)  Support Systems: Child(darien), Parent  Patient Expects to be Discharged to[de-identified] Rehabilitation facility  Current DME Used/Available at Home: None    PLOF: Pt I for ADLS/IADLS, I with mobility prior to admission. Hand dominance: Right    EXAMINATION OF PERFORMANCE DEFICITS:  Cognitive/Behavioral Status:  Neurologic State: Alert  Orientation Level: Oriented X4  Cognition: Follows commands; Appropriate decision making  Perception: Appears intact  Perseveration: No perseveration noted  Safety/Judgement: Fall prevention    Skin: intact where visible    Edema: none noted    Hearing: Auditory  Auditory Impairment: None    Vision/Perceptual:    Corrective Lenses: Glasses    Range of Motion:  AROM: Grossly decreased, non-functional    Strength:  Strength: Grossly decreased, non-functional     RUE Strength  Observation: grossly WFL     LUE Strength  Observation: grossly observed to be 2-/5    Coordination:  Coordination: Grossly decreased, non-functional    Tone & Sensation:  Tone: Abnormal  Sensation: Impaired    Balance:  Sitting: Intact; With support  Standing: Impaired; With support  Standing - Static: Fair;Constant support  Standing - Dynamic : Constant support;Poor    Functional Mobility and Transfers for ADLs:  Bed Mobility:  Rolling: Stand-by assistance;Minimum assistance (SBA roll to L, min A roll R)  Supine to Sit: Minimum assistance  Scooting: Stand-by assistance; Additional time    Transfers:  Sit to Stand: Minimum assistance  Stand to Sit: Minimum assistance  Bed to Chair: Minimum assistance  Toilet Transfer : Minimum assistance [facility])  Assistive Device : Gait Belt    ADL Assessment:    Oral Facial Hygiene/Grooming: Setup    Lower Body Dressing: Total assistance    Toileting: Minimum assistance    ADL Intervention and task modifications:    Grooming  Grooming Assistance: Set-up  Position Performed: Seated in chair  Washing Face: Set-up    Lower Body Dressing Assistance  Dressing Assistance: Total assistance(dependent)  Socks:  Total assistance (dependent)  Position Performed: Seated edge of bed    Toileting  Toileting Assistance: Minimum assistance  Bladder Hygiene: Minimum assistance  Clothing Management: Minimum assistance    Cognitive Retraining  Safety/Judgement: Fall prevention    Therapeutic Exercise:  Patient may benefit from UE HEP, initiate at next session as able. Functional Measure:    [address] [place] [address] AM-PACTM \"6 Clicks\"                                                       Daily Activity Inpatient Short Form  How much help from another person does the patient currently need. .. Total; A Lot A Little None   1. Putting on and taking off regular lower body clothing? [x]  1 []  2 []  3 []  4   2. Bathing (including washing, rinsing, drying)? []  1 [x]  2 []  3 []  4   3. Toileting, which includes using toilet, bedpan or urinal? [] 1 []  2 [x]  3 []  4   4. Putting on and taking off regular upper body clothing? []  1 []  2 [x]  3 []  4   5. Taking care of personal grooming such as brushing teeth? []  1 []  2 [x]  3 []  4   6. Eating meals? []  1 []  2 [x]  3 []  4   © 2007, Trustees of [address] [place] [address], under license to [employer]. All rights reserved     Score: 15/24     Interpretation of Tool:  Represents clinically-significant functional categories (i.e. Activities of daily living). Percentage of Impairment CH    0%   CI    1-19% CJ    20-39% CK    40-59% CL    60-79% CM    80-99% CN     100%   AMPA  Score 6-24 24 23 20-22 15-19 10-14 7-9 6        Occupational Therapy Evaluation Charge Determination   History Examination Decision-Making   LOW Complexity : Brief history review  MEDIUM Complexity : 3-5 performance deficits relating to physical, cognitive , or psychosocial skils that result in activity limitations and / or participation restrictions MEDIUM Complexity : Patient may present with comorbidities that affect occupational performnce.  Miniml to moderate modification of tasks or assistance (eg, physical or verbal ) with assesment(s) is necessary to enable patient to complete evaluation       Based on the above components, the patient evaluation is determined to be of the following complexity level: LOW     Pain Ratin/10 headache and neck pain    Activity Tolerance:   Fair and SpO2 stable on RA    After treatment patient left in no apparent distress:    Sitting in chair and Call bell within reach    COMMUNICATION/EDUCATION:   The patients plan of care was discussed with: Physical therapist, Speech therapist and Registered nurse. Patient/family have participated as able in goal setting and plan of care. and Patient/family agree to work toward stated goals and plan of care. This patients plan of care is appropriate for delegation to [facility].     Problem: Self Care Deficits Care Plan (Adult)  Goal: *Acute Goals and Plan of Care (Insert Text)  Description: Pt will be mod I sup <> sit in prep for EOB ADLs  Pt will be I grooming sitting EOB  Pt will be min A LE dressing sitting EOB/long sit  Pt will be mod I sit <>  prep for toileting LRAD  Pt will be mod I toileting/toilet transfer/cloth mgmt LRAD  Pt will be I following UE HEP in prep for self care tasks   Outcome: Not Met     Thank you for this referral.  Mei Suggs, OTR/L  Time Calculation: 32 mins

## 2021-08-27 NOTE — H&P
History and Physical    NAME: Sharmaine Salvador   :  1990   MRN:  [id number]     Date/Time:  2021 9:54 PM    Patient PCP: Jovani Vines NP  ______________________________________________________________________             Subjective:     CHIEF COMPLAINT:     Headache and left-sided weakness    HISTORY OF PRESENT ILLNESS:       Patient is a 27y.o. year old female signal possibility history of asthma obesity and chronic smoker came to the ER with 2-week history of headache headache in the frontal throbbing nonradiating no aggravating or relieving factor no photophobia patient experienced left-sided weakness of upper and lower extremity yesterday's work-up this morning with persistent weakening came to emergency room seen by the ER physician work-up done including a CT of the head which was negative for acute stroke patient has significant upper extremity weakness patient was admitted for further work-up    Past Medical History:   Diagnosis Date    Asthma         History reviewed. No pertinent surgical history. Social History     Tobacco Use    Smoking status: Former Smoker     Quit date: 2020     Years since quittin.0    Smokeless tobacco: Never Used   Substance Use Topics    Alcohol use: Never        No family history on file. No Known Allergies     Prior to Admission medications    Medication Sig Start Date End Date Taking? Authorizing Provider   azithromycin (Zithromax Z-Stephan) 250 mg tablet Take 2 tablet p.o. day 1 then 1 tablet p.o. day 2 through 5 21   Ubaldo Habermann, MD   albuterol (PROVENTIL HFA, VENTOLIN HFA, PROAIR HFA) 90 mcg/actuation inhaler Take 2 Puffs by inhalation every four (4) hours as needed for Wheezing. 21   Ubaldo Habermann, MD   dextromethorphan-guaiFENesin (Robitussin Cough-Chest Uriel DM) 5-100 mg/5 mL liqd Take 5 mL by mouth every six (6) hours.  21   Ubaldo Habermann, MD   sodium chloride (Ayr Saline) 0.65 % drop 2 Drops by Both Nostrils route every two (2) hours as needed for Congestion. 6/4/21   Lynnette Tafoya NP   fluticasone propionate (FLONASE) 50 mcg/actuation nasal spray 2 Sprays by Both Nostrils route daily. 6/4/21   Melia Plascencia NP   albuterol (Ventolin HFA) 90 mcg/actuation inhaler Take 2 Puffs by inhalation every four (4) hours as needed for Wheezing, Shortness of Breath or Cough.  9/28/20   Clotilda Dance, NP   predniSONE (DELTASONE) 20 mg tablet 3 Tabs for 3 days 2 tabs for 3 days 1 tab for 3 days 9/28/20   Clotilda Dance, NP         Current Facility-Administered Medications:     albuterol (PROVENTIL HFA, VENTOLIN HFA, PROAIR HFA) inhaler 2 Puff, 2 Puff, Inhalation, Q4H PRN, Miguel Angel Garcia MD    [START ON 8/27/2021] fluticasone propionate (FLONASE) 50 mcg/actuation nasal spray 2 Spray, 2 Spray, Both Nostrils, DAILY, Arminda Garcia MD    0.9% sodium chloride infusion, 75 mL/hr, IntraVENous, CONTINUOUS, Miguel Angel Garcia MD    acetaminophen (TYLENOL) tablet 650 mg, 650 mg, Oral, Q6H PRN **OR** acetaminophen (TYLENOL) suppository 650 mg, 650 mg, Rectal, Q6H PRN, Miguel Angel Garcia MD    polyethylene glycol (MIRALAX) packet 17 g, 17 g, Oral, DAILY PRN, Miguel Angel Garcia MD    ondansetron (ZOFRAN ODT) tablet 4 mg, 4 mg, Oral, Q8H PRN **OR** ondansetron (ZOFRAN) injection 4 mg, 4 mg, IntraVENous, Q6H PRN, Arminda Garcia MD [place]  [START ON 8/27/2021] enoxaparin (LOVENOX) injection 40 mg, 40 mg, SubCUTAneous, DAILY, Miguel Angel Garcia MD [place] [facility]  [START ON 8/27/2021] aspirin tablet 325 mg, 325 mg, Oral, DAILY, Miguel Angel Garcia MD    atorvastatin (LIPITOR) tablet 40 mg, 40 mg, Oral, QHS, Arminda Garcia MD    Current Outpatient Medications:     azithromycin (Zithromax Z-Stephan) 250 mg tablet, Take 2 tablet p.o. day 1 then 1 tablet p.o. day 2 through 5, Disp: 6 Tablet, Rfl: 0    albuterol (PROVENTIL HFA, VENTOLIN HFA, PROAIR HFA) 90 mcg/actuation inhaler, Take 2 Puffs by inhalation every four (4) hours as needed for Wheezing., Disp: 1 Inhaler, Rfl: 0    dextromethorphan-guaiFENesin (Robitussin Cough-Chest Uriel DM) 5-100 mg/5 mL liqd, Take 5 mL by mouth every six (6) hours. , Disp: 200 mL, Rfl: 0    sodium chloride (Ayr Saline) 0.65 % drop, 2 Drops by Both Nostrils route every two (2) hours as needed for Congestion. , Disp: 15 mL, Rfl: 0    fluticasone propionate (FLONASE) 50 mcg/actuation nasal spray, 2 Sprays by Both Nostrils route daily. , Disp: 1 Bottle, Rfl: 0    albuterol (Ventolin HFA) 90 mcg/actuation inhaler, Take 2 Puffs by inhalation every four (4) hours as needed for Wheezing, Shortness of Breath or Cough. , Disp: 1 Inhaler, Rfl: 0    predniSONE (DELTASONE) 20 mg tablet, 3 Tabs for 3 days 2 tabs for 3 days 1 tab for 3 days, Disp: 18 Tab, Rfl: 0    LAB DATA REVIEWED:    Recent Results (from the past 24 hour(s))   CBC WITH AUTOMATED DIFF    Collection Time: 08/26/21 12:30 PM   Result Value Ref Range    WBC 10.0 3.6 - 11.0 K/uL    RBC 4.11 3.80 - 5.20 M/uL    HGB 11.7 11.5 - 16.0 g/dL    HCT 36.4 35.0 - 47.0 %    MCV 88.6 80.0 - 99.0 FL    MCH 28.5 26.0 - 34.0 PG    MCHC 32.1 30.0 - 36.5 g/dL    RDW 13.4 11.5 - 14.5 %    PLATELET 633 920 - 064 K/uL    MPV 10.0 8.9 - 12.9 FL    NRBC 0.0 0.0  WBC    ABSOLUTE NRBC 0.00 0.00 - 0.01 K/uL    NEUTROPHILS 58 32 - 75 %    LYMPHOCYTES 29 12 - 49 %    MONOCYTES 9 5 - 13 %    EOSINOPHILS 3 0 - 7 %    BASOPHILS 1 0 - 1 %    IMMATURE GRANULOCYTES 0 0 - 0.5 %    ABS. NEUTROPHILS 5.9 1.8 - 8.0 K/UL    ABS. LYMPHOCYTES 2.9 0.8 - 3.5 K/UL    ABS. MONOCYTES 0.9 0.0 - 1.0 K/UL    ABS. EOSINOPHILS 0.3 0.0 - 0.4 K/UL    ABS. BASOPHILS 0.1 0.0 - 0.1 K/UL    ABS. IMM.  GRANS. 0.0 0.00 - 0.04 K/UL    DF AUTOMATED     METABOLIC PANEL, COMPREHENSIVE    Collection Time: 08/26/21 12:30 PM   Result Value Ref Range    Sodium 139 136 - 145 mmol/L    Potassium 3.9 3.5 - 5.1 mmol/L    Chloride 106 97 - 108 mmol/L    CO2 30 21 - 32 mmol/L    Anion gap 3 (L) 5 - 15 mmol/L    Glucose 86 65 - 100 mg/dL    BUN 13 6 - 20 mg/dL    Creatinine 0.76 0.55 - 1.02 mg/dL    BUN/Creatinine ratio 17 12 - 20      GFR est AA >60 >60 ml/min/1.73m2    GFR est non-AA >60 >60 ml/min/1.73m2    Calcium 8.9 8.5 - 10.1 mg/dL    Bilirubin, total 0.3 0.2 - 1.0 mg/dL    AST (SGOT) 11 (L) 15 - 37 U/L    ALT (SGPT) 18 12 - 78 U/L    Alk. phosphatase 37 (L) 45 - 117 U/L    Protein, total 7.4 6.4 - 8.2 g/dL    Albumin 3.2 (L) 3.5 - 5.0 g/dL    Globulin 4.2 (H) 2.0 - 4.0 g/dL    A-G Ratio 0.8 (L) 1.1 - 2.2     PROTHROMBIN TIME + INR    Collection Time: 08/26/21 12:30 PM   Result Value Ref Range    Prothrombin time 13.6 11.9 - 14.7 sec    INR 1.1 0.9 - 1.1     PTT    Collection Time: 08/26/21 12:30 PM   Result Value Ref Range    aPTT 29.8 21.2 - 34.1 sec    aPTT, therapeutic range   82 - 109 sec   TROPONIN I    Collection Time: 08/26/21 12:30 PM   Result Value Ref Range    Troponin-I, Qt. <0.05 <0.05 ng/mL   C REACTIVE PROTEIN, QT    Collection Time: 08/26/21 12:30 PM   Result Value Ref Range    C-Reactive protein 1.52 (H) 0.00 - 0.60 mg/dL   SED RATE (ESR)    Collection Time: 08/26/21 12:30 PM   Result Value Ref Range    Sed rate, automated 44 mm/hr   DRUG SCREEN, URINE    Collection Time: 08/26/21 12:30 PM   Result Value Ref Range    AMPHETAMINES Negative Negative      BARBITURATES Positive (A) Negative      BENZODIAZEPINES Negative Negative      COCAINE Negative Negative      METHADONE Negative Negative      OPIATES Negative Negative      PCP(PHENCYCLIDINE) Negative Negative      THC (TH-CANNABINOL) Positive (A) Negative      Drug screen comment        This test is a screen for drugs of abuse in a medical setting only (i.e., they are unconfirmed results and as such must not be used for non-medical purposes, e.g.,employment testing, legal testing). Due to its inherent nature, false positive (FP) and false negative (FN) results may be obtained. Therefore, if necessary for medical care, recommend confirmation of positive findings by GC/MS.        XR Results (most recent):  Results from Hospital Encounter encounter on 09/28/20    XR CHEST SNGL V    Narrative  EXAM: XR CHEST SNGL V    INDICATION: cough    COMPARISON: No images available from prior exam to review    FINDINGS:  Unremarkable cardiomediastinal contours. No focal airspace consolidation. No  pneumothorax or pleural effusion. No acute fracture or dislocation. Impression  IMPRESSION:  No acute finding. CTA CODE NEURO HEAD AND NECK W CONT   Final Result   No occlusion or high-grade stenosis of the major cervical or intracranial   arterial vasculature. Please note that degrees of carotid stenosis are measured in accordance with   NASCET criteria. Report sent to the referring ER at the time of dictation. CT CODE NEURO HEAD WO CONTRAST   Final Result   1. No acute large vessel intracranial ischemia, hemorrhage. Called report to Dr. Waldemar Gongora at 12:07 PM 8/26/2021. DUPLEX CAROTID BILATERAL    (Results Pending)   MRI BRAIN W WO CONT    (Results Pending)        Review of Systems:  Constitutional: Headache  HENT: Negative. Eyes: Negative. Respiratory: Negative. Cardiovascular: Negative. Gastrointestinal: Negative for abdominal pain and nausea. Skin: Negative. Neurological: Left-sided weakness  Objective:   VITALS:    Visit Vitals  /70   Pulse 63   Temp 98.1 °F (36.7 °C)   Resp 20   Ht 5' 9\" (1.753 m)   Wt 131.5 kg (290 lb)   SpO2 100%   BMI 42.83 kg/m²       Physical Exam:   Constitutional: pt is oriented to person, place, and time. HENT:   Head: Normocephalic and atraumatic. Eyes: Pupils are equal, round, and reactive to light. EOM are normal.   Cardiovascular: Normal rate, regular rhythm and normal heart sounds. Pulmonary/Chest: Breath sounds normal. No wheezes. No rales. Exhibits no tenderness. Abdominal: Soft. Bowel sounds are normal. There is no abdominal tenderness. There is no rebound and no guarding. Musculoskeletal: Normal range of motion. Neurological: pt is alert and oriented to person, place, and time.   Left-sided weakness    ASSESSMENT & PLAN:    Acute CVA with left-sided weakness  Headache  Morbid obesity  History of asthma  Ongoing smoking  Smoked marijuana      Admit to telemetry floor  PT OT speech therapy consult  Neurology consult  Start on aspirin statin IV fluids  2D echo carotid Doppler MRI of the brain        Current Facility-Administered Medications:     albuterol (PROVENTIL HFA, VENTOLIN HFA, PROAIR HFA) inhaler 2 Puff, 2 Puff, Inhalation, Q4H PRN, Arminda Garcia MD    [START ON 8/27/2021] fluticasone propionate (FLONASE) 50 mcg/actuation nasal spray 2 Spray, 2 Spray, Both Nostrils, DAILY, Arminda Garcia MD    0.9% sodium chloride infusion, 75 mL/hr, IntraVENous, CONTINUOUS, Julio Garcia MD    acetaminophen (TYLENOL) tablet 650 mg, 650 mg, Oral, Q6H PRN **OR** acetaminophen (TYLENOL) suppository 650 mg, 650 mg, Rectal, Q6H PRN, Julio Garcia MD    polyethylene glycol (MIRALAX) packet 17 g, 17 g, Oral, DAILY PRN, Julio Garcia MD    ondansetron (ZOFRAN ODT) tablet 4 mg, 4 mg, Oral, Q8H PRN **OR** ondansetron (ZOFRAN) injection 4 mg, 4 mg, IntraVENous, Q6H PRN, Arminda Garcia MD    [START ON 8/27/2021] enoxaparin (LOVENOX) injection 40 mg, 40 mg, SubCUTAneous, DAILY, Julio Garcia MD    [START ON 8/27/2021] aspirin tablet 325 mg, 325 mg, Oral, DAILY, Julio Garcia MD    atorvastatin (LIPITOR) tablet 40 mg, 40 mg, Oral, QHS, Arminda Garcia MD    Current Outpatient Medications:     azithromycin (Zithromax Z-Stephan) 250 mg tablet, Take 2 tablet p.o. day 1 then 1 tablet p.o. day 2 through 5, Disp: 6 Tablet, Rfl: 0    albuterol (PROVENTIL HFA, VENTOLIN HFA, PROAIR HFA) 90 mcg/actuation inhaler, Take 2 Puffs by inhalation every four (4) hours as needed for Wheezing., Disp: 1 Inhaler, Rfl: 0    dextromethorphan-guaiFENesin (Robitussin Cough-Chest Uriel DM) 5-100 mg/5 mL liqd, Take 5 mL by mouth every six (6) hours. , Disp: 200 mL, Rfl: 0    sodium chloride (Ayr Saline) 0.65 % drop, 2 Drops by Both Nostrils route every two (2) hours as needed for Congestion. , Disp: 15 mL, Rfl: 0    fluticasone propionate (FLONASE) 50 mcg/actuation nasal spray, 2 Sprays by Both Nostrils route daily. , Disp: 1 Bottle, Rfl: 0    albuterol (Ventolin HFA) 90 mcg/actuation inhaler, Take 2 Puffs by inhalation every four (4) hours as needed for Wheezing, Shortness of Breath or Cough. , Disp: 1 Inhaler, Rfl: 0    predniSONE (DELTASONE) 20 mg tablet, 3 Tabs for 3 days 2 tabs for 3 days 1 tab for 3 days, Disp: 18 Tab, Rfl: 0      ________________________________________________________________________    Signed: Roberta Mitchell MD

## 2021-08-27 NOTE — PROGRESS NOTES
Bedside shift change report given to Remedios RN (oncoming nurse) by Phani Jorge LPN (offgoing nurse). Report included the following information SBAR, Kardex, Intake/Output, MAR, Accordion, and Recent Results.

## 2021-08-27 NOTE — PROGRESS NOTES
Bedside swallow evaluation completed. Rec regular/thin diet w/ general asp/GERD precautions. No further ST intervention at this time. Please re-consult as medically indicated.

## 2021-08-27 NOTE — PROGRESS NOTES
SPEECH LANGUAGE PATHOLOGY BEDSIDE SWALLOW EVALUATIONS  Patient: Whit Floyd  (74 y.o. )  Date: 8/27/2021  Primary Diagnosis: CVA   Precautions:      ASSESSMENT :  Patient alert, oriented x4, and follows basic commands. No facial droop or dysarthria . Informally, speech language is wfl. Patient presents w/ wfl oropharyngeal swallow fxn. Oral phase c/b adequate mastication and A-P transit. Pharyngeal phase c/b timely swallow initiation and HLE is wfl upon palpation. No overt s/s of pen/asp observed. PLAN :  Recommendations and Planned Interventions:  Rec regular/thin w/ strict asp/GERD precautions. No further ST intervention at this time. Please re-consult as medically indicated. SUBJECTIVE:   Patient denies dysphagia and GERD. OBJECTIVE:   Patient admitted w/ head ache x 2 weeks and LUE weakness. Past Medical History:   Diagnosis Date    Asthma      CTA CODE NEURO HEAD AND NECK W CONT   Final Result   No occlusion or high-grade stenosis of the major cervical or intracranial   arterial vasculature. Please note that degrees of carotid stenosis are measured in accordance with   NASCET criteria. Report sent to the referring ER at the time of dictation. CT CODE NEURO HEAD WO CONTRAST   Final Result   1. No acute large vessel intracranial ischemia, hemorrhage. Called report to Dr. Laura Torres at 12:07 PM 8/26/2021.       DUPLEX CAROTID BILATERAL    (Results Pending)   MRI BRAIN W WO CONT    (Results Pending)         Diet prior to admission: REgular  Current Diet:  DIET ADULT     Cognitive and Communication Status:  Neurologic State: Alert  Orientation Level: Oriented X4  Cognition: Follows commands  Perception: Appears intact  Perseveration: No perseveration noted  Safety/Judgement: Awareness of environment  Swallowing Evaluation:   Oral Assessment:  Oral Assessment  Labial: No impairment  Dentition: Intact  Oral Hygiene: caries  Lingual: No impairment  Velum: No impairment  Mandible: No impairment  P.O. Trials:  Patient Position: upright in bed  Vocal quality prior to P.O.: No impairment  Consistency Presented: Puree; Solid; Thin liquid  How Presented: Self-fed/presented;Cup/sip;Spoon;Straw     Bolus Acceptance: No impairment  Bolus Formation/Control: No impairment     Propulsion: No impairment  Oral Residue: None  Initiation of Swallow: No impairment  Laryngeal Elevation: Functional  Aspiration Signs/Symptoms: None  Pharyngeal Phase Characteristics: No impairment, issues, or problems              Oral Phase Severity: No impairment  Pharyngeal Phase Severity : No impairment  Voice:   Vocal Quality: No impairment          Pain:  Pain Scale 1: Numeric (0 - 10)  Pain Intensity 1: 0       After treatment:   Patient left in no apparent distress in bed, Call bell within reach and Nursing notified    COMMUNICATION/EDUCATION:   Patient was educated regarding s/s aspiration, aspiration precautions, diet recs, and BEFAST. She demonstrated Fair understanding as evidenced by reduced engagement.     Thank you for this referral.  Cherelle Camargo M.S., M.Ed., CCC-SLP  Time Calculation: 15 mins

## 2021-08-27 NOTE — PROGRESS NOTES
General Daily Progress Note          Patient Name:   Adeola Monday       YOB: 1990       Age:  27 y.o. Admit Date: 8/26/2021      Subjective:         Left upper extremity weakness        Objective:     Visit Vitals  /72   Pulse 61   Temp 98.2 °F (36.8 °C)   Resp 18   Ht 5' 9\" (1.753 m)   Wt 131.5 kg (290 lb)   SpO2 99%   BMI 42.83 kg/m²        Recent Results (from the past 24 hour(s))   METABOLIC PANEL, COMPREHENSIVE    Collection Time: 08/27/21  3:59 AM   Result Value Ref Range    Sodium 139 136 - 145 mmol/L    Potassium 3.6 3.5 - 5.1 mmol/L    Chloride 106 97 - 108 mmol/L    CO2 26 21 - 32 mmol/L    Anion gap 7 5 - 15 mmol/L    Glucose 81 65 - 100 mg/dL    BUN 13 6 - 20 mg/dL    Creatinine 0.60 0.55 - 1.02 mg/dL    BUN/Creatinine ratio 22 (H) 12 - 20      GFR est AA >60 >60 ml/min/1.73m2    GFR est non-AA >60 >60 ml/min/1.73m2    Calcium 8.4 (L) 8.5 - 10.1 mg/dL    Bilirubin, total 0.3 0.2 - 1.0 mg/dL    AST (SGOT) 8 (L) 15 - 37 U/L    ALT (SGPT) 14 12 - 78 U/L    Alk. phosphatase 32 (L) 45 - 117 U/L    Protein, total 6.5 6.4 - 8.2 g/dL    Albumin 2.7 (L) 3.5 - 5.0 g/dL    Globulin 3.8 2.0 - 4.0 g/dL    A-G Ratio 0.7 (L) 1.1 - 2.2     CBC WITH AUTOMATED DIFF    Collection Time: 08/27/21  3:59 AM   Result Value Ref Range    WBC 7.6 3.6 - 11.0 K/uL    RBC 3.78 (L) 3.80 - 5.20 M/uL    HGB 10.8 (L) 11.5 - 16.0 g/dL    HCT 33.8 (L) 35.0 - 47.0 %    MCV 89.4 80.0 - 99.0 FL    MCH 28.6 26.0 - 34.0 PG    MCHC 32.0 30.0 - 36.5 g/dL    RDW 13.4 11.5 - 14.5 %    PLATELET 682 956 - 195 K/uL    MPV 9.8 8.9 - 12.9 FL    NRBC 0.0 0.0  WBC    ABSOLUTE NRBC 0.00 0.00 - 0.01 K/uL    NEUTROPHILS 51 32 - 75 %    LYMPHOCYTES 37 12 - 49 %    MONOCYTES 8 5 - 13 %    EOSINOPHILS 3 0 - 7 %    BASOPHILS 1 0 - 1 %    IMMATURE GRANULOCYTES 0 0 - 0.5 %    ABS. NEUTROPHILS 3.9 1.8 - 8.0 K/UL    ABS. LYMPHOCYTES 2.8 0.8 - 3.5 K/UL    ABS. MONOCYTES 0.6 0.0 - 1.0 K/UL    ABS.  EOSINOPHILS 0.2 0.0 - 0.4 K/UL ABS. BASOPHILS 0.0 0.0 - 0.1 K/UL    ABS. IMM.  GRANS. 0.0 0.00 - 0.04 K/UL    DF AUTOMATED     TSH 3RD GENERATION    Collection Time: 08/27/21  3:59 AM   Result Value Ref Range    TSH 2.50 0.36 - 3.74 uIU/mL   DUPLEX CAROTID BILATERAL    Collection Time: 08/27/21 10:59 AM   Result Value Ref Range    Left CCA dist sys 99.7 cm/s    Left CCA dist dong 22.0 cm/s    Left CCA prox sys 99.3 cm/s    Left CCA prox dong 17.2 cm/s    Left ICA dist sys 80.2 cm/s    Left ICA dist dong 48.1 cm/s    Left ICA prox sys 93.2 cm/s    Left ICA prox dong 18.3 cm/s    Left ECA sys 83.9 cm/s    LEFT EXTERNAL CAROTID ARTERY D 15.10 cm/s    Left subclavian sys 133.0 cm/s    LEFT SUBCLAVIAN ARTERY D 13.40 cm/s    Left vertebral sys 64.6 cm/s    LEFT VERTEBRAL ARTERY D 21.30 cm/s    Right cca dist sys 85.9 cm/s    Right CCA dist dong 19.2 cm/s    Right CCA prox sys 100.0 cm/s    Right CCA prox dong 16.5 cm/s    Right ICA dist sys 60.9 cm/s    Right ICA dist dong 31.4 cm/s    Right ICA prox sys 84.6 cm/s    Right ICA prox dong 28.9 cm/s    Right eca sys 98.3 cm/s    RIGHT EXTERNAL CAROTID ARTERY D 19.20 cm/s    Right subclavian sys 118.0 cm/s    RIGHT SUBCLAVIAN ARTERY D 0.00 cm/s    Right vertebral sys 46.2 cm/s    RIGHT VERTEBRAL ARTERY D 15.20 cm/s   ECHO ADULT COMPLETE    Collection Time: 08/27/21 11:50 AM   Result Value Ref Range    Aortic Valve Systolic Peak Velocity 299.60 cm/s    AoV PG 6.00 mmHg    Ao Root D 3.10 cm    IVSd 1.05 (A) 0.60 - 0.90 cm    LVIDd 4.72 3.90 - 5.30 cm    LVIDs 3.18 cm    LVOT Peak Velocity 103.00 cm/s    LVOT Peak Gradient 4.00 mmHg    LVPWd 0.68 0.60 - 0.90 cm    LV E' Septal Velocity 9.36 cm/s    LV ED Vol A2C 105.00 cm3    LV ES Vol A2C 32.20 cm3    E/E' septal 8.96     Left Atrium Major Axis 3.10 cm    Mitral Valve Deceleration [place] 6,100.00 mm/s2    Mitral Valve Deceleration [place] 6,100.00 mm/s2    Mitral Valve E Wave Deceleration Time 218.00 ms    Mitral Valve Pressure Half-time 56.00 ms    MV A Ede 56.80 cm/s    MV E Ede 83.90 cm/s    MVA (PHT) 3.93 cm2    MV E/A 1.48     Pulmonic Regurgitant End Max Velocity 72.40 cm/s    Pulmonic Valve Systolic Peak Instantaneous Gradient 2.00 mmHg    Pulmonic Valve Max Velocity 78.40 cm/s    Pulmonic Valve Systolic Peak Instantaneous Gradient 2.00 mmHg    P Vein A Dur 95.00 ms    Pulmonary Vein \"A\" Wave Velocity 23.40 cm/s    Est. RA Pressure 8.00 mmHg    RVIDd 3.15 cm    RVSP 29.00 mmHg    TR Max Velocity 227.00 cm/s    TV MG 21.00 mmHg    LA Area 4C 15.39 cm2    BP EF 57.9 55.0 - 100.0 %    LV Mass .4 67.0 - 162.0 g    LV Mass AL Index 56.4 43.0 - 95.0 g/m2    Left Atrium Minor Axis 1.28 cm     [unfilled]      Review of Systems    Constitutional: Negative for chills and fever. HENT: Negative. Eyes: Negative. Respiratory: Negative. Cardiovascular: Negative. Gastrointestinal: Negative for abdominal pain and nausea. Skin: Negative. Neurological: Negative. Physical Exam:      Constitutional: pt is oriented to person, place, and time. HENT:   Head: Normocephalic and atraumatic. Eyes: Pupils are equal, round, and reactive to light. EOM are normal.   Cardiovascular: Normal rate, regular rhythm and normal heart sounds. Pulmonary/Chest: Breath sounds normal. No wheezes. No rales. Exhibits no tenderness. Abdominal: Soft. Bowel sounds are normal. There is no abdominal tenderness. There is no rebound and no guarding. Musculoskeletal: Normal range of motion. Neurological: pt is alert and oriented to person, place, and time. DUPLEX CAROTID BILATERAL   Final Result      CTA CODE NEURO HEAD AND NECK W CONT   Final Result   No occlusion or high-grade stenosis of the major cervical or intracranial   arterial vasculature. Please note that degrees of carotid stenosis are measured in accordance with   NASCET criteria. Report sent to the referring ER at the time of dictation. CT CODE NEURO HEAD WO CONTRAST   Final Result   1.  No acute large vessel intracranial ischemia, hemorrhage. Called report to Dr. Joanna Humphries at 12:07 PM 8/26/2021. MRI BRAIN W WO CONT    (Results Pending)        Recent Results (from the past 24 hour(s))   METABOLIC PANEL, COMPREHENSIVE    Collection Time: 08/27/21  3:59 AM   Result Value Ref Range    Sodium 139 136 - 145 mmol/L    Potassium 3.6 3.5 - 5.1 mmol/L    Chloride 106 97 - 108 mmol/L    CO2 26 21 - 32 mmol/L    Anion gap 7 5 - 15 mmol/L    Glucose 81 65 - 100 mg/dL    BUN 13 6 - 20 mg/dL    Creatinine 0.60 0.55 - 1.02 mg/dL    BUN/Creatinine ratio 22 (H) 12 - 20      GFR est AA >60 >60 ml/min/1.73m2    GFR est non-AA >60 >60 ml/min/1.73m2    Calcium 8.4 (L) 8.5 - 10.1 mg/dL    Bilirubin, total 0.3 0.2 - 1.0 mg/dL    AST (SGOT) 8 (L) 15 - 37 U/L    ALT (SGPT) 14 12 - 78 U/L    Alk. phosphatase 32 (L) 45 - 117 U/L    Protein, total 6.5 6.4 - 8.2 g/dL    Albumin 2.7 (L) 3.5 - 5.0 g/dL    Globulin 3.8 2.0 - 4.0 g/dL    A-G Ratio 0.7 (L) 1.1 - 2.2     CBC WITH AUTOMATED DIFF    Collection Time: 08/27/21  3:59 AM   Result Value Ref Range    WBC 7.6 3.6 - 11.0 K/uL    RBC 3.78 (L) 3.80 - 5.20 M/uL    HGB 10.8 (L) 11.5 - 16.0 g/dL    HCT 33.8 (L) 35.0 - 47.0 %    MCV 89.4 80.0 - 99.0 FL    MCH 28.6 26.0 - 34.0 PG    MCHC 32.0 30.0 - 36.5 g/dL    RDW 13.4 11.5 - 14.5 %    PLATELET 262 742 - 971 K/uL    MPV 9.8 8.9 - 12.9 FL    NRBC 0.0 0.0  WBC    ABSOLUTE NRBC 0.00 0.00 - 0.01 K/uL    NEUTROPHILS 51 32 - 75 %    LYMPHOCYTES 37 12 - 49 %    MONOCYTES 8 5 - 13 %    EOSINOPHILS 3 0 - 7 %    BASOPHILS 1 0 - 1 %    IMMATURE GRANULOCYTES 0 0 - 0.5 %    ABS. NEUTROPHILS 3.9 1.8 - 8.0 K/UL    ABS. LYMPHOCYTES 2.8 0.8 - 3.5 K/UL    ABS. MONOCYTES 0.6 0.0 - 1.0 K/UL    ABS. EOSINOPHILS 0.2 0.0 - 0.4 K/UL    ABS. BASOPHILS 0.0 0.0 - 0.1 K/UL    ABS. IMM.  GRANS. 0.0 0.00 - 0.04 K/UL    DF AUTOMATED     TSH 3RD GENERATION    Collection Time: 08/27/21  3:59 AM   Result Value Ref Range    TSH 2.50 0.36 - 3.74 uIU/mL   DUPLEX CAROTID BILATERAL    Collection Time: 08/27/21 10:59 AM   Result Value Ref Range    Left CCA dist sys 99.7 cm/s    Left CCA dist dong 22.0 cm/s    Left CCA prox sys 99.3 cm/s    Left CCA prox dong 17.2 cm/s    Left ICA dist sys 80.2 cm/s    Left ICA dist dong 48.1 cm/s    Left ICA prox sys 93.2 cm/s    Left ICA prox dong 18.3 cm/s    Left ECA sys 83.9 cm/s    LEFT EXTERNAL CAROTID ARTERY D 15.10 cm/s    Left subclavian sys 133.0 cm/s    LEFT SUBCLAVIAN ARTERY D 13.40 cm/s    Left vertebral sys 64.6 cm/s    LEFT VERTEBRAL ARTERY D 21.30 cm/s    Right cca dist sys 85.9 cm/s    Right CCA dist dong 19.2 cm/s    Right CCA prox sys 100.0 cm/s    Right CCA prox dong 16.5 cm/s    Right ICA dist sys 60.9 cm/s    Right ICA dist dong 31.4 cm/s    Right ICA prox sys 84.6 cm/s    Right ICA prox dong 28.9 cm/s    Right eca sys 98.3 cm/s    RIGHT EXTERNAL CAROTID ARTERY D 19.20 cm/s    Right subclavian sys 118.0 cm/s    RIGHT SUBCLAVIAN ARTERY D 0.00 cm/s    Right vertebral sys 46.2 cm/s    RIGHT VERTEBRAL ARTERY D 15.20 cm/s   ECHO ADULT COMPLETE    Collection Time: 08/27/21 11:50 AM   Result Value Ref Range    Aortic Valve Systolic Peak Velocity 396.98 cm/s    AoV PG 6.00 mmHg    Ao Root D 3.10 cm    IVSd 1.05 (A) 0.60 - 0.90 cm    LVIDd 4.72 3.90 - 5.30 cm    LVIDs 3.18 cm    LVOT Peak Velocity 103.00 cm/s    LVOT Peak Gradient 4.00 mmHg    LVPWd 0.68 0.60 - 0.90 cm    LV E' Septal Velocity 9.36 cm/s    LV ED Vol A2C 105.00 cm3    LV ES Vol A2C 32.20 cm3    E/E' septal 8.96     Left Atrium Major Axis 3.10 cm    Mitral Valve Deceleration [place] 6,100.00 mm/s2    Mitral Valve Deceleration [place] 6,100.00 mm/s2    Mitral Valve E Wave Deceleration Time 218.00 ms    Mitral Valve Pressure Half-time 56.00 ms    MV A Ede 56.80 cm/s    MV E Ede 83.90 cm/s    MVA (PHT) 3.93 cm2    MV E/A 1.48     Pulmonic Regurgitant End Max Velocity 72.40 cm/s    Pulmonic Valve Systolic Peak Instantaneous Gradient 2.00 mmHg    Pulmonic Valve Max Velocity 78.40 cm/s    Pulmonic Valve Systolic Peak Instantaneous Gradient 2.00 mmHg    P Vein A Dur 95.00 ms    Pulmonary Vein \"A\" Wave Velocity 23.40 cm/s    Est. RA Pressure 8.00 mmHg    RVIDd 3.15 cm    RVSP 29.00 mmHg    TR Max Velocity 227.00 cm/s    TV MG 21.00 mmHg    LA Area 4C 15.39 cm2    BP EF 57.9 55.0 - 100.0 %    LV Mass .4 67.0 - 162.0 g    LV Mass AL Index 56.4 43.0 - 95.0 g/m2    Left Atrium Minor Axis 1.28 cm       Results     ** No results found for the last 336 hours. **           Labs:     Recent Labs     08/27/21  0359 08/26/21  1230   WBC 7.6 10.0   HGB 10.8* 11.7   HCT 33.8* 36.4    360     Recent Labs     08/27/21  0359 08/26/21  1230    139   K 3.6 3.9    106   CO2 26 30   BUN 13 13   CREA 0.60 0.76   GLU 81 86   CA 8.4* 8.9     Recent Labs     08/27/21  0359 08/26/21  1230   ALT 14 18   AP 32* 37*   TBILI 0.3 0.3   TP 6.5 7.4   ALB 2.7* 3.2*   GLOB 3.8 4.2*     Recent Labs     08/26/21  1230   INR 1.1   PTP 13.6   APTT 29.8      No results for input(s): FE, TIBC, PSAT, FERR in the last 72 hours. No results found for: FOL, RBCF   No results for input(s): PH, PCO2, PO2 in the last 72 hours.   Recent Labs     08/26/21  1230   TROIQ <0.05     No results found for: CHOL, CHOLX, CHLST, CHOLV, HDL, HDLP, LDL, LDLC, DLDLP, TGLX, TRIGL, TRIGP, CHHD, CHHDX  No results found for: GLUCPOC  No results found for: COLOR, APPRN, SPGRU, REFSG, BENNIE, PROTU, GLUCU, KETU, BILU, UROU, PRANAV, LEUKU, GLUKE, EPSU, BACTU, WBCU, RBCU, CASTS, UCRY      Assessment:     Acute CVA with left-sided weakness  Headache  Morbid obesity  History of asthma  Ongoing smoking  Smoked marijuana      Plan:     Continue aspirin Lipitor  Patient started on Depacon for headache    Stroke work-up pending including carotid Doppler 2D echo and MRI of the brain    PT OT consult        Current Facility-Administered Medications:     topiramate (TOPAMAX) sprinkle capsule 25 mg, 25 mg, Oral, Q12H, Ambreen Mail IV, Rolando Drown, MD Armida Brunner valproate (DEPACON) 1,000 mg in 0.9% sodium chloride 50 mL IVPB, 1 g, IntraVENous, ONCE, AIDA Culver IV, MD    albuterol (PROVENTIL HFA, VENTOLIN HFA, PROAIR HFA) inhaler 2 Puff, 2 Puff, Inhalation, Q4H PRN, Edwin Garcia MD    fluticasone propionate (FLONASE) 50 mcg/actuation nasal spray 2 Spray, 2 Spray, Both Nostrils, DAILY, Arminda Garcia MD    0.9% sodium chloride infusion, 75 mL/hr, IntraVENous, CONTINUOUS, Arminda Garcia MD, Last Rate: 75 mL/hr at 08/27/21 0005, 75 mL/hr at 08/27/21 0005    polyethylene glycol (MIRALAX) packet 17 g, 17 g, Oral, DAILY PRN, Edwin Garcia MD    ondansetron (ZOFRAN ODT) tablet 4 mg, 4 mg, Oral, Q8H PRN **OR** ondansetron (ZOFRAN) injection 4 mg, 4 mg, IntraVENous, Q6H PRN, Arminda Garcia MD    enoxaparin (LOVENOX) injection 40 mg, 40 mg, SubCUTAneous, DAILY, Arminda Garcia MD    aspirin tablet 325 mg, 325 mg, Oral, DAILY, Arminda Garcia MD    atorvastatin (LIPITOR) tablet 40 mg, 40 mg, Oral, QHS, Arminda Garcia MD, 40 mg at 08/27/21 0003

## 2021-08-27 NOTE — CONSULTS
NEURO CONSULT      REASON FOR ADMISSION:  Exacerbation of headache      HISTORY:  Ms. Benedict Mcfarland is 27years old with a history of asthma, obesity, headaches, smoker who is consulted to neurology for 2-week history of reported headaches. According to her, her headaches are usually frontal and associated with photophobia and occasional left-sided weakness. Patient subsequently presented to the ER. In the ER, patient had a head CT without contrast that did not show any acute process. She was subsequently admitted to the floor. ROS: N/a    General:                     No fever, no chills, no sweats, no generalized weakness, no weight loss/gain,                                       No loss of appetite   Eyes:                           No blurred vision, no eye pain, no loss of vision, no double vision  ENT:                            rhinorrhea, no pharyngitis   Respiratory:               No cough, no sputum production, no SOB, no KIM, no wheezing, no pleuritic pain   Cardiology:                No chest pain, no palpitations, no orthopnea, no PND, no edema, no syncope   Gastrointestinal:       No abdominal pain , no N/V, no diarrhea, no dysphagia, no constipation, no bleeding   Genitourinary:           frequency, no urgency, no dysuria, no hematuria, no incontinence   Muskuloskeletal :      No arthralgia, no myalgia, no back pain  Hematology:              No easy bruising, no nose or gum bleeding, no lymphadenopathy   Dermatological:         No rash, no ulceration, no pruritis, no color change / jaundice  Endocrine:                 hot flashes or polydipsia   Neurological:             No headache, no dizziness, no confusion, no focal weakness, no paresthesia,                                      No Speech difficulties, no memory loss, no gait difficulty  Psychological:          No neelings of anxiety, no depression, no agitation      NEURO EXAM:    Mental status: Awake, oriented, nonaphasic.   Slightly downcast    Cranial nerves: Cranial nerves intact. Motor exam: Gross bulk is obese.   No focality    Sensory exam: No tactile extinction    Coordination: Coordination is fair    Gait and Station: Gait and station is    ASSESSMENT:  Exacerbation of migraine headaches      PLAN:  Depacon 1 g IV stat over 30 minutes  Topamax 25 mg p.o. twice daily  Butalbital as needed  EEG  TSH      ALLERGIES:    No Known Allergies    MEDS:      Current Facility-Administered Medications:     albuterol (PROVENTIL HFA, VENTOLIN HFA, PROAIR HFA) inhaler 2 Puff, 2 Puff, Inhalation, Q4H PRN, Arminda Garcia MD    fluticasone propionate (FLONASE) 50 mcg/actuation nasal spray 2 Spray, 2 Spray, Both Nostrils, DAILY, Arminda Garcia MD    0.9% sodium chloride infusion, 75 mL/hr, IntraVENous, CONTINUOUS, Arminda Garcia MD, Last Rate: 75 mL/hr at 08/27/21 0005, 75 mL/hr at 08/27/21 0005    acetaminophen (TYLENOL) tablet 650 mg, 650 mg, Oral, Q6H PRN, 650 mg at 08/27/21 0003 **OR** acetaminophen (TYLENOL) suppository 650 mg, 650 mg, Rectal, Q6H PRN, Arminda Garcia MD    polyethylene glycol (MIRALAX) packet 17 g, 17 g, Oral, DAILY PRN, Karlo Garcia MD    ondansetron (ZOFRAN ODT) tablet 4 mg, 4 mg, Oral, Q8H PRN **OR** ondansetron (ZOFRAN) injection 4 mg, 4 mg, IntraVENous, Q6H PRN, Arminda Garcia MD    enoxaparin (LOVENOX) injection 40 mg, 40 mg, SubCUTAneous, DAILY, Karlo Garcia MD    aspirin tablet 325 mg, 325 mg, Oral, DAILY, Karlo Garcia MD    atorvastatin (LIPITOR) tablet 40 mg, 40 mg, Oral, QHS, Arminda Garcia MD, 40 mg at 08/27/21 0003    LABS:  Recent Results (from the past 24 hour(s))   CBC WITH AUTOMATED DIFF    Collection Time: 08/26/21 12:30 PM   Result Value Ref Range    WBC 10.0 3.6 - 11.0 K/uL    RBC 4.11 3.80 - 5.20 M/uL    HGB 11.7 11.5 - 16.0 g/dL    HCT 36.4 35.0 - 47.0 %    MCV 88.6 80.0 - 99.0 FL    MCH 28.5 26.0 - 34.0 PG    MCHC 32.1 30.0 - 36.5 g/dL    RDW 13.4 11.5 - 14.5 % PLATELET 370 694 - 688 K/uL    MPV 10.0 8.9 - 12.9 FL    NRBC 0.0 0.0  WBC    ABSOLUTE NRBC 0.00 0.00 - 0.01 K/uL    NEUTROPHILS 58 32 - 75 %    LYMPHOCYTES 29 12 - 49 %    MONOCYTES 9 5 - 13 %    EOSINOPHILS 3 0 - 7 %    BASOPHILS 1 0 - 1 %    IMMATURE GRANULOCYTES 0 0 - 0.5 %    ABS. NEUTROPHILS 5.9 1.8 - 8.0 K/UL    ABS. LYMPHOCYTES 2.9 0.8 - 3.5 K/UL    ABS. MONOCYTES 0.9 0.0 - 1.0 K/UL    ABS. EOSINOPHILS 0.3 0.0 - 0.4 K/UL    ABS. BASOPHILS 0.1 0.0 - 0.1 K/UL    ABS. IMM. GRANS. 0.0 0.00 - 0.04 K/UL    DF AUTOMATED     METABOLIC PANEL, COMPREHENSIVE    Collection Time: 08/26/21 12:30 PM   Result Value Ref Range    Sodium 139 136 - 145 mmol/L    Potassium 3.9 3.5 - 5.1 mmol/L    Chloride 106 97 - 108 mmol/L    CO2 30 21 - 32 mmol/L    Anion gap 3 (L) 5 - 15 mmol/L    Glucose 86 65 - 100 mg/dL    BUN 13 6 - 20 mg/dL    Creatinine 0.76 0.55 - 1.02 mg/dL    BUN/Creatinine ratio 17 12 - 20      GFR est AA >60 >60 ml/min/1.73m2    GFR est non-AA >60 >60 ml/min/1.73m2    Calcium 8.9 8.5 - 10.1 mg/dL    Bilirubin, total 0.3 0.2 - 1.0 mg/dL    AST (SGOT) 11 (L) 15 - 37 U/L    ALT (SGPT) 18 12 - 78 U/L    Alk.  phosphatase 37 (L) 45 - 117 U/L    Protein, total 7.4 6.4 - 8.2 g/dL    Albumin 3.2 (L) 3.5 - 5.0 g/dL    Globulin 4.2 (H) 2.0 - 4.0 g/dL    A-G Ratio 0.8 (L) 1.1 - 2.2     PROTHROMBIN TIME + INR    Collection Time: 08/26/21 12:30 PM   Result Value Ref Range    Prothrombin time 13.6 11.9 - 14.7 sec    INR 1.1 0.9 - 1.1     PTT    Collection Time: 08/26/21 12:30 PM   Result Value Ref Range    aPTT 29.8 21.2 - 34.1 sec    aPTT, therapeutic range   82 - 109 sec   TROPONIN I    Collection Time: 08/26/21 12:30 PM   Result Value Ref Range    Troponin-I, Qt. <0.05 <0.05 ng/mL   C REACTIVE PROTEIN, QT    Collection Time: 08/26/21 12:30 PM   Result Value Ref Range    C-Reactive protein 1.52 (H) 0.00 - 0.60 mg/dL   SED RATE (ESR)    Collection Time: 08/26/21 12:30 PM   Result Value Ref Range    Sed rate, automated 44 mm/hr   DRUG SCREEN, URINE    Collection Time: 08/26/21 12:30 PM   Result Value Ref Range    AMPHETAMINES Negative Negative      BARBITURATES Positive (A) Negative      BENZODIAZEPINES Negative Negative      COCAINE Negative Negative      METHADONE Negative Negative      OPIATES Negative Negative      PCP(PHENCYCLIDINE) Negative Negative      THC (TH-CANNABINOL) Positive (A) Negative      Drug screen comment        This test is a screen for drugs of abuse in a medical setting only (i.e., they are unconfirmed results and as such must not be used for non-medical purposes, e.g.,employment testing, legal testing). Due to its inherent nature, false positive (FP) and false negative (FN) results may be obtained. Therefore, if necessary for medical care, recommend confirmation of positive findings by GC/MS. METABOLIC PANEL, COMPREHENSIVE    Collection Time: 08/27/21  3:59 AM   Result Value Ref Range    Sodium 139 136 - 145 mmol/L    Potassium 3.6 3.5 - 5.1 mmol/L    Chloride 106 97 - 108 mmol/L    CO2 26 21 - 32 mmol/L    Anion gap 7 5 - 15 mmol/L    Glucose 81 65 - 100 mg/dL    BUN 13 6 - 20 mg/dL    Creatinine 0.60 0.55 - 1.02 mg/dL    BUN/Creatinine ratio 22 (H) 12 - 20      GFR est AA >60 >60 ml/min/1.73m2    GFR est non-AA >60 >60 ml/min/1.73m2    Calcium 8.4 (L) 8.5 - 10.1 mg/dL    Bilirubin, total 0.3 0.2 - 1.0 mg/dL    AST (SGOT) 8 (L) 15 - 37 U/L    ALT (SGPT) 14 12 - 78 U/L    Alk.  phosphatase 32 (L) 45 - 117 U/L    Protein, total 6.5 6.4 - 8.2 g/dL    Albumin 2.7 (L) 3.5 - 5.0 g/dL    Globulin 3.8 2.0 - 4.0 g/dL    A-G Ratio 0.7 (L) 1.1 - 2.2     CBC WITH AUTOMATED DIFF    Collection Time: 08/27/21  3:59 AM   Result Value Ref Range    WBC 7.6 3.6 - 11.0 K/uL    RBC 3.78 (L) 3.80 - 5.20 M/uL    HGB 10.8 (L) 11.5 - 16.0 g/dL    HCT 33.8 (L) 35.0 - 47.0 %    MCV 89.4 80.0 - 99.0 FL    MCH 28.6 26.0 - 34.0 PG    MCHC 32.0 30.0 - 36.5 g/dL    RDW 13.4 11.5 - 14.5 %    PLATELET 636 324 - 277 K/uL    MPV 9.8 8.9 - 12.9 FL    NRBC 0.0 0.0  WBC    ABSOLUTE NRBC 0.00 0.00 - 0.01 K/uL    NEUTROPHILS 51 32 - 75 %    LYMPHOCYTES 37 12 - 49 %    MONOCYTES 8 5 - 13 %    EOSINOPHILS 3 0 - 7 %    BASOPHILS 1 0 - 1 %    IMMATURE GRANULOCYTES 0 0 - 0.5 %    ABS. NEUTROPHILS 3.9 1.8 - 8.0 K/UL    ABS. LYMPHOCYTES 2.8 0.8 - 3.5 K/UL    ABS. MONOCYTES 0.6 0.0 - 1.0 K/UL    ABS. EOSINOPHILS 0.2 0.0 - 0.4 K/UL    ABS. BASOPHILS 0.0 0.0 - 0.1 K/UL    ABS. IMM. GRANS. 0.0 0.00 - 0.04 K/UL    DF AUTOMATED     DUPLEX CAROTID BILATERAL    Collection Time: 08/27/21 10:59 AM   Result Value Ref Range    Left CCA dist sys 99.7 cm/s    Left CCA dist dong 22.0 cm/s    Left CCA prox sys 99.3 cm/s    Left CCA prox dong 17.2 cm/s    Left ICA dist sys 80.2 cm/s    Left ICA dist dong 48.1 cm/s    Left ICA prox sys 93.2 cm/s    Left ICA prox dong 18.3 cm/s    Left ECA sys 83.9 cm/s    LEFT EXTERNAL CAROTID ARTERY D 15.10 cm/s    Left subclavian sys 133.0 cm/s    LEFT SUBCLAVIAN ARTERY D 13.40 cm/s    Left vertebral sys 64.6 cm/s    LEFT VERTEBRAL ARTERY D 21.30 cm/s    Right cca dist sys 85.9 cm/s    Right CCA dist dong 19.2 cm/s    Right CCA prox sys 100.0 cm/s    Right CCA prox dong 16.5 cm/s    Right ICA dist sys 60.9 cm/s    Right ICA dist dong 31.4 cm/s    Right ICA prox sys 84.6 cm/s    Right ICA prox dong 28.9 cm/s    Right eca sys 98.3 cm/s    RIGHT EXTERNAL CAROTID ARTERY D 19.20 cm/s    Right subclavian sys 118.0 cm/s    RIGHT SUBCLAVIAN ARTERY D 0.00 cm/s    Right vertebral sys 46.2 cm/s    RIGHT VERTEBRAL ARTERY D 15.20 cm/s       Visit Vitals  /72   Pulse 61   Temp 98.2 °F (36.8 °C)   Resp 18   Ht 5' 9\" (1.753 m)   Wt 131.5 kg (290 lb)   SpO2 99%   BMI 42.83 kg/m²       Imaging:  DUPLEX CAROTID BILATERAL         CTA CODE NEURO HEAD AND NECK W CONT   Final Result   No occlusion or high-grade stenosis of the major cervical or intracranial   arterial vasculature.          Please note that degrees of carotid stenosis are measured in accordance with   NASCET criteria. Report sent to the referring ER at the time of dictation. CT CODE NEURO HEAD WO CONTRAST   Final Result   1. No acute large vessel intracranial ischemia, hemorrhage. Called report to Dr. Maria Elena Jain at 12:07 PM 8/26/2021.       MRI BRAIN W WO CONT    (Results Pending)

## 2021-08-27 NOTE — ROUTINE PROCESS
TRANSFER - OUT REPORT:    Verbal report given to Kavehjoejose on Mdaelyn Janus  being transferred to  for routine progression of care       Report consisted of patients Situation, Background, Assessment and   Recommendations(SBAR). Information from the following report(s) SBAR, ED Summary, Intake/Output, MAR and Recent Results was reviewed with the receiving nurse. Lines:   Peripheral IV 08/26/21 Right Antecubital (Active)        Opportunity for questions and clarification was provided.       Patient transported with:   [facility]

## 2021-08-27 NOTE — PROGRESS NOTES
PHYSICAL THERAPY EVALUATION  Patient: Hannah Moreland (4 y.o. female)  Date: 8/27/2021  Primary Diagnosis: CVA (cerebral vascular accident) [facility]) [I63.9]        Precautions: falls    ASSESSMENT  This is a 28 y/o female came to  [facility]  with c/o  headache for 2 weeks,  left-sided weakness of upper and lower extremity ,admitted for CVA workup on 8/26/2021. CT scan of head shows no acute large vessel intracranial ischemia. MRI brain pending . Pt has PMH of asthma, obesity and  chronic smoking. Pt received sitting on the recliner , agreeable for PT eval/Tx . Pt is  A& O x 4 , per pt report , she lives with her mother and son  in a single story home with 1 step to enter , no HR  ,  IND with ADL's and IND for functional transfers/mobility with no AD prior to admission . DME owned : none     Based on the objective data described below, the patient presents with c/o pain at head and neck - 7/10 , impaired sensation L LE and UE ,  decreased strength , -2/5 grossly for  L LE with L foot drop , R Le- WFL , balance deficits , coordination deficits and increased need for assist with functional mobility  . Pt able to hold her L arm in semi flexion throughout the session but  unable to move when asked to  . Pt demonstrates static sitting balance ,  Yared for sit <>stand  , Yared for bed<>chair transfers  , fair static  standing balance , is able to take few steps towards the bed - 3'  with HW, Yared , dragged her L LE with no foot clearance . PT attempted to block pt's  L knee from buckling , but pt able to maintain her L knee in extension ) . Pt would benefit from skilled PT services while at [facility] in order to increase safety and independence with functional transfers/mobility. Recommend d/c to IRF when medically appropriate     Current Level of Function Impacting Discharge (mobility/balance): Requires Yared for functional mobility     Functional Outcome Measure:   The patient scored 15 on the AM PAC basic mobility outcome measure which is indicative of medium complexity. Other factors to consider for discharge:severity of deficits ,time since onset ,  inconsistent presentation        PLAN :  Recommendations and Planned Interventions: bed mobility training, transfer training, gait training, therapeutic exercises, patient and family training/education and therapeutic activities      Frequency/Duration: Patient will be followed by physical therapy:  5 times a week to address goals. Recommendation for discharge: (in order for the patient to meet his/her long term goals)  IRF    This discharge recommendation:  Has been made in collaboration with the attending provider and/or case management    IF patient discharges home will need the following DME: to be determined (TBD)         SUBJECTIVE:   Patient stated I have no sensation in my hand     OBJECTIVE DATA SUMMARY:   HISTORY:    Past Medical History:   Diagnosis Date    Asthma    History reviewed. No pertinent surgical history. Personal factors and/or comorbidities impacting plan of care:     Home Situation  Home Environment: Private residence  # Steps to Enter: 1  Rails to Enter: No  One/Two Story Residence: One story  Living Alone: No  Support Systems: Child(darien), Parent  Current DME Used/Available at Home: None    PLOF: Pt IND for ADLS/IADLS, IND with mobility prior to admission.      EXAMINATION/PRESENTATION/DECISION MAKING:   Critical Behavior:  Neurologic State: Alert  Orientation Level: Oriented to time , oriented x 4   Cognition: Follows commands  Safety/Judgement: Awareness of environment  Hearing:     Skin:  Intact where exposed    Range Of Motion:  AROM: Generally decreased, functional  Strength:    Strength: Generally decreased, functional (-2/5 grossly L LE , R Le - WFL )  Tone & Sensation:   Tone: Abnormal (flaccid L UE , hypotonic L LE)  Sensation: Impaired (L Le and UE)    Coordination:  Coordination: Generally decreased, functional    Functional Mobility:  Bed Mobility:  Transfers:  Sit to Stand: Minimum assistance  Stand to Sit: Minimum assistance        Bed to Chair: Minimum assistance  Balance:   Sitting: Intact; With support  Standing: Impaired; With support  Standing - Static: Fair;Constant support  Standing - Dynamic : Constant support;Poor  Ambulation/Gait Training:  Distance (ft): 3 Feet (ft)  Assistive Device: Gait belt  Ambulation - Level of Assistance: Minimal assistance     Functional Measure:    [facility] AM-PAC 6 Clicks         Basic Mobility Inpatient Short Form  How much difficulty does the patient currently have. .. Unable A Lot A Little None   1. Turning over in bed (including adjusting bedclothes, sheets and blankets)? [] 1   [] 2   [x] 3   [] 4   2. Sitting down on and standing up from a chair with arms ( e.g., wheelchair, bedside commode, etc.)   [] 1   [] 2   [x] 3   [] 4   3. Moving from lying on back to sitting on the side of the bed? [] 1   [] 2   [x] 3   [] 4          How much help from another person does the patient currently need. .. Total A Lot A Little None   4. Moving to and from a bed to a chair (including a wheelchair)? [] 1   [] 2   [x] 3   [] 4   5. Need to walk in hospital room? [] 1   [x] 2   [] 3   [] 4   6. Climbing 3-5 steps with a railing? [x] 1   [] 2   [] 3   [] 4   © 2007, Trustees of [facility], under license to [employer]. All rights reserved     Score:  Initial: 15 Most Recent: X (Date: -8/27/21- )   Interpretation of Tool:  Represents activities that are increasingly more difficult (i.e. Bed mobility, Transfers, Gait).   Score 24 23 22-20 19-15 14-10 9-7 6   Modifier CH CI CJ CK CL CM CN          Physical Therapy Evaluation Charge Determination   History Examination Presentation Decision-Making   LOW Complexity : Zero comorbidities / personal factors that will impact the outcome / POC MEDIUM Complexity : 3 Standardized tests and measures addressing body structure, function, activity limitation and / or participation in recreation  LOW Complexity : Stable, uncomplicated  Other Functional Measure AMPA 6 medium complexity      Based on the above components, the patient evaluation is determined to be of the following complexity level: LOW     Pain Rating:  Pain at neck and head - 7/10    Activity Tolerance:   Fair  Please refer to the flowsheet for vital signs taken during this treatment. After treatment patient left in no apparent distress:   Supine in bed and Call bell within reach    COMMUNICATION/EDUCATION:   The patients plan of care was discussed with: Registered nurse. Fall prevention education was provided and the patient/caregiver indicated understanding. and Patient/family agree to work toward stated goals and plan of care. PT goals   Pt will be I with LE HEP in 7 days. Pt will perform bed mobility with mod I in 7 days. Pt will perform transfers with SBA in 7 days. Pt will amb -10 feet with LRAD safely with Yared in 7 days. Pt will demonstrate improvement in standing dynamic balance from poor to fair in 7 days.      Thank you for this referral.  Luiz Sanchez   Time Calculation: 38 mins

## 2021-08-28 PROCEDURE — 65270000029 HC RM PRIVATE

## 2021-08-28 PROCEDURE — 74011250637 HC RX REV CODE- 250/637: Performed by: FAMILY MEDICINE

## 2021-08-28 PROCEDURE — 74011250637 HC RX REV CODE- 250/637: Performed by: PSYCHIATRY & NEUROLOGY

## 2021-08-28 PROCEDURE — 74011250636 HC RX REV CODE- 250/636: Performed by: FAMILY MEDICINE

## 2021-08-28 PROCEDURE — 97530 THERAPEUTIC ACTIVITIES: CPT

## 2021-08-28 RX ORDER — BUTALBITAL, ACETAMINOPHEN AND CAFFEINE 50; 325; 40 MG/1; MG/1; MG/1
1 TABLET ORAL
Status: DISCONTINUED | OUTPATIENT
Start: 2021-08-28 | End: 2021-08-30 | Stop reason: HOSPADM

## 2021-08-28 RX ADMIN — BUTALBITAL, ACETAMINOPHEN, AND CAFFEINE 1 TABLET: 50; 325; 40 TABLET ORAL at 07:25

## 2021-08-28 RX ADMIN — TOPIRAMATE 25 MG: 25 CAPSULE, COATED PELLETS ORAL at 11:44

## 2021-08-28 RX ADMIN — ACETAMINOPHEN 650 MG: 325 TABLET ORAL at 15:42

## 2021-08-28 RX ADMIN — ATORVASTATIN CALCIUM 40 MG: 40 TABLET, FILM COATED ORAL at 21:43

## 2021-08-28 RX ADMIN — FLUTICASONE PROPIONATE 2 SPRAY: 50 SPRAY, METERED NASAL at 11:44

## 2021-08-28 RX ADMIN — SODIUM CHLORIDE 75 ML/HR: 9 INJECTION, SOLUTION INTRAVENOUS at 07:24

## 2021-08-28 RX ADMIN — ENOXAPARIN SODIUM 40 MG: 40 INJECTION SUBCUTANEOUS at 07:26

## 2021-08-28 RX ADMIN — ASPIRIN 325 MG ORAL TABLET 325 MG: 325 PILL ORAL at 07:26

## 2021-08-28 NOTE — PROGRESS NOTES
Problem: Mobility Impaired (Adult and Pediatric)  Goal: *Acute Goals and Plan of Care (Insert Text)  Description: Pt will be I with LE HEP in 7 days. Pt will perform bed mobility with mod I in 7 days. Pt will perform transfers with SBA in 7 days. Pt will amb -10 feet with LRAD safely with Yared in 7 days. Pt will demonstrate improvement in standing dynamic balance from poor to fair in 7 days. Outcome: Progressing Towards Goal   PHYSICAL THERAPY TREATMENT  Patient: Leila Mckay (77 y.o. female)  Date: 8/28/2021  Diagnosis: CVA (cerebral vascular accident) [facility]) [I63.9] <principal problem not specified>       Precautions: Fall  Chart, physical therapy assessment, plan of care and goals were reviewed. ASSESSMENT  Patient continues with skilled PT services and is progressing towards goals. Upon entry into room, patient sitting in recliner and agreeable to work with PT. Performed PROM LUE and AAROM LLE for shoulder flexion and LAQ seated in recliner before standing. Sit<>stand with modA and use of jonathan walker. Patient stood for several minutes in place and performed weight shifting L<>R a few times to work on incr weight bearing through LLE however, patient's hip are very stiff and sore from being in the bed and recliner for so long. She ambulated ~3 feet forward<>backward with Yared and use of jonathan walker. No LOB noted in standing and patient with good descent into recliner. She was awfully fatigued after short gait distance. Patient will benefit from continued skilled PT services with emphasis on intensity and repetition for improved neuroplasticity to prevent further decline 2/2 CVA deficits. Patient does not want to D/C to IRF but rather go home and get therapy at home. We discussed the benefit of discharging to IRF for better prognosis.      Current Level of Function Impacting Discharge (mobility/balance): decr mobiltiy, decr balance, decr endurance     Other factors to consider for discharge: family hx of CVA, obesity, good family support, LUE mostly flaccid         PLAN :  Patient continues to benefit from skilled intervention to address the above impairments. Continue treatment per established plan of care. to address goals. Recommendation for discharge: (in order for the patient to meet his/her long term goals)  Therapy 3 hours per day 5-7 days per week    This discharge recommendation:  Has been made in collaboration with the attending provider and/or case management    IF patient discharges home will need the following DME: walker and to be determined (TBD)       SUBJECTIVE:   Patient stated I really want to be able to get up and into the shower and clean my whole body.     OBJECTIVE DATA SUMMARY:   Critical Behavior:  Neurologic State: Alert  Orientation Level: Oriented X4  Cognition: Appropriate for age attention/concentration, Appropriate safety awareness, Follows commands  Safety/Judgement: Fall prevention  Functional Mobility Training:  Bed Mobility:  Patient seated in recliner upon therapist entry into room      Transfers:  Sit to Stand: Moderate assistance  Stand to Sit: Minimum assistance  Incr time to perform and visual cueing for proper performance of use of jonathan walker        Balance:  Sitting: Intact  Standing: Impaired; Without support  Standing - Static: Good;Constant support  Standing - Dynamic : Fair;Constant support  Ambulation/Gait Training:  Distance (ft): 3 Feet (ft)  Assistive Device: Walker jonathan;Gait belt  Ambulation - Level of Assistance: Minimal assistance;Assist x1     Cues needed for sequencing of steps with jonathan-walker.  Significant time to complete 3 feet of ambulation     Therapeutic Exercises:   10 reps PROM L shoulder flexion  10 reps AAROM LLE LAQ  10x AROM RLE LAQ and marching     Pain Rating:  No reports no pain but more stiffness in L side of body and marcelo hips     Activity Tolerance:   Fair and requires rest breaks  Please refer to the flowsheet for vital signs taken during this treatment.     After treatment patient left in no apparent distress:   Sitting in chair, Call bell within reach, and instructed patient to call for nursing when ready to get out of bed     COMMUNICATION/COLLABORATION:   The patients plan of care was discussed with: Physical therapist.     Andrew Allen   Time Calculation: 32 mins

## 2021-08-28 NOTE — PROGRESS NOTES
Bedside shift change report given to Kendal Nayak LPN (oncoming nurse) by Lisset Aviles LPN (offgoing nurse). Report included the following information SBAR, Kardex, Intake/Output, MAR, Accordion, and Recent Results.

## 2021-08-28 NOTE — PROGRESS NOTES
Pt complained of headache 10 out of 10 pain scale. Pain starts in the back of her head and radiates towards her eyes. Pt was assessed, vitals were taken and Dr. Tab Pérez notified. New orders received and implemented.

## 2021-08-28 NOTE — PROGRESS NOTES
General Daily Progress Note          Patient Name:   Yves Silverio       YOB: 1990       Age:  27 y.o. Admit Date: 8/26/2021      Subjective:         Left upper extremity weakness        Objective:     Visit Vitals  BP (!) 128/92   Pulse 79   Temp 98.2 °F (36.8 °C)   Resp 18   Ht 5' 9\" (1.753 m)   Wt 131.5 kg (290 lb)   SpO2 100%   BMI 42.83 kg/m²        No results found for this or any previous visit (from the past 24 hour(s)). [unfilled]      Review of Systems    Constitutional: Negative for chills and fever. HENT: Negative. Eyes: Negative. Respiratory: Negative. Cardiovascular: Negative. Gastrointestinal: Negative for abdominal pain and nausea. Skin: Negative. Neurological: Negative. Physical Exam:      Constitutional: pt is oriented to person, place, and time. HENT:   Head: Normocephalic and atraumatic. Eyes: Pupils are equal, round, and reactive to light. EOM are normal.   Cardiovascular: Normal rate, regular rhythm and normal heart sounds. Pulmonary/Chest: Breath sounds normal. No wheezes. No rales. Exhibits no tenderness. Abdominal: Soft. Bowel sounds are normal. There is no abdominal tenderness. There is no rebound and no guarding. Musculoskeletal: Normal range of motion. Neurological: pt is alert and oriented to person, place, and time. DUPLEX CAROTID BILATERAL   Final Result      CTA CODE NEURO HEAD AND NECK W CONT   Final Result   No occlusion or high-grade stenosis of the major cervical or intracranial   arterial vasculature. Please note that degrees of carotid stenosis are measured in accordance with   NASCET criteria. Report sent to the referring ER at the time of dictation. CT CODE NEURO HEAD WO CONTRAST   Final Result   1. No acute large vessel intracranial ischemia, hemorrhage. Called report to Dr. Percy Brown at 12:07 PM 8/26/2021.       MRI BRAIN W WO CONT    (Results Pending)        No results found for this or any previous visit (from the past 24 hour(s)). Results     ** No results found for the last 336 hours. **           Labs:     Recent Labs     08/27/21  0359 08/26/21  1230   WBC 7.6 10.0   HGB 10.8* 11.7   HCT 33.8* 36.4    360     Recent Labs     08/27/21  0359 08/26/21  1230    139   K 3.6 3.9    106   CO2 26 30   BUN 13 13   CREA 0.60 0.76   GLU 81 86   CA 8.4* 8.9     Recent Labs     08/27/21  0359 08/26/21  1230   ALT 14 18   AP 32* 37*   TBILI 0.3 0.3   TP 6.5 7.4   ALB 2.7* 3.2*   GLOB 3.8 4.2*     Recent Labs     08/26/21  1230   INR 1.1   PTP 13.6   APTT 29.8      No results for input(s): FE, TIBC, PSAT, FERR in the last 72 hours. No results found for: FOL, RBCF   No results for input(s): PH, PCO2, PO2 in the last 72 hours. Recent Labs     08/26/21  1230   TROIQ <0.05     No results found for: CHOL, CHOLX, CHLST, CHOLV, HDL, HDLP, LDL, LDLC, DLDLP, TGLX, TRIGL, TRIGP, CHHD, CHHDX  No results found for: GLUCPOC  No results found for: COLOR, APPRN, SPGRU, REFSG, BENNIE, PROTU, GLUCU, KETU, BILU, UROU, PRANAV, LEUKU, GLUKE, EPSU, BACTU, WBCU, RBCU, CASTS, UCRY      Assessment:     Acute CVA with left-sided weakness  Headache  Morbid obesity  History of asthma  Ongoing smoking  Smoked marijuana    · LV: Estimated LVEF is 55 - 60%. Normal cavity size, wall thickness, systolic function (ejection fraction normal) and diastolic function. · IAS: Agitated saline contrast study was performed. · Saline contrast was given to evaluate for intracardiac shunt. · Antegrade vertebral artery flow b/l  · No evidence of carotid artery stenosis by PSV or Velocity ratio.   Plan:     Continue aspirin Lipitor  Patient started on Depacon for headache        PT OT consult    Discharge planning to inpatient rehab    MRI pending      Current Facility-Administered Medications:     butalbital-acetaminophen-caffeine (FIORICET, ESGIC) -40 mg per tablet 1 Tablet, 1 Tablet, Oral, Q6H PRN, Griselda Garcia MD, 1 Tablet at 08/28/21 0725    topiramate (TOPAMAX) sprinkle capsule 25 mg, 25 mg, Oral, Q12H, Abiola COOPER, AIDA Fonseca MD, 25 mg at 08/28/21 1144    acetaminophen (TYLENOL) tablet 650 mg, 650 mg, Oral, Q4H PRN, Arminda Garcia MD, 650 mg at 08/27/21 2101    albuterol (PROVENTIL HFA, VENTOLIN HFA, PROAIR HFA) inhaler 2 Puff, 2 Puff, Inhalation, Q4H PRN, Dimitri Garcia MD    fluticasone propionate (FLONASE) 50 mcg/actuation nasal spray 2 Spray, 2 Spray, Both Nostrils, DAILY, Arminda Garcia MD, 2 [place] at 08/28/21 1144    0.9% sodium chloride infusion, 75 mL/hr, IntraVENous, CONTINUOUS, Arminda Garcia MD, Last Rate: 75 mL/hr at 08/28/21 0724, 75 mL/hr at 08/28/21 0724    polyethylene glycol (MIRALAX) packet 17 g, 17 g, Oral, DAILY PRN, Dimitri Garcia MD    ondansetron (ZOFRAN ODT) tablet 4 mg, 4 mg, Oral, Q8H PRN **OR** ondansetron (ZOFRAN) injection 4 mg, 4 mg, IntraVENous, Q6H PRN, Arminda Garcia MD    enoxaparin (LOVENOX) injection 40 mg, 40 mg, SubCUTAneous, DAILY, Arminda Garcia MD, 40 mg at 08/28/21 8161    aspirin tablet 325 mg, 325 mg, Oral, DAILY, Arminda Garcia MD, 325 mg at 08/28/21 0726    atorvastatin (LIPITOR) tablet 40 mg, 40 mg, Oral, QHS, rAminda Garcia MD, 40 mg at 08/27/21 2101

## 2021-08-28 NOTE — PROGRESS NOTES
Progress Note      8/28/2021 5:57 AM  NAME: Ernesto Bocanegra   MRN:  [id number]   Admit Diagnosis: CVA (cerebral vascular accident) [facility]) [I63.9]      Problem List:   Cardiology sign-on cross cover progress note-[facility]  (Marcin Navas MD)    PROBLEM LIST:  1. Headache  2. Acute cerebrovascular accident (CVA) with left-sided weakness  3. Chest pain, atypical for angina  4. Nicotine dependence  5. Marijuana use    6. Asthma  7. Headache  8. Morbid obesity (42.83 kg/m²)  9. Mild anemia  10. Hypocalcemia       Subjective: The patient is seen and examined in room 429. No acute cardiovascular events reported overnight. Currently, she denies any cardiovascular complaints. Specifically, she denies any chest pain, pressure or tightness. Further, there is no shortness of breath or dyspnea on exertion. She is awaiting head/brain MRI.   Medications Personally Reviewed:    Current Facility-Administered Medications   Medication Dose Route Frequency    butalbital-acetaminophen-caffeine (FIORICET, ESGIC) -40 mg per tablet 1 Tablet  1 Tablet Oral Q6H PRN    topiramate (TOPAMAX) sprinkle capsule 25 mg  25 mg Oral Q12H    acetaminophen (TYLENOL) tablet 650 mg  650 mg Oral Q4H PRN    albuterol (PROVENTIL HFA, VENTOLIN HFA, PROAIR HFA) inhaler 2 Puff  2 Puff Inhalation Q4H PRN    fluticasone propionate (FLONASE) 50 mcg/actuation nasal spray 2 Spray  2 Spray Both Nostrils DAILY    0.9% sodium chloride infusion  75 mL/hr IntraVENous CONTINUOUS    polyethylene glycol (MIRALAX) packet 17 g  17 g Oral DAILY PRN    ondansetron (ZOFRAN ODT) tablet 4 mg  4 mg Oral Q8H PRN    Or    ondansetron (ZOFRAN) injection 4 mg  4 mg IntraVENous Q6H PRN    enoxaparin (LOVENOX) injection 40 mg  40 mg SubCUTAneous DAILY    aspirin tablet 325 mg  325 mg Oral DAILY    atorvastatin (LIPITOR) tablet 40 mg  40 mg Oral QHS           Objective:      Physical Exam:  Last 24hrs VS reviewed since prior progress note. Most recent are:    Visit Vitals  /70   Pulse 88   Temp 98.7 °F (37.1 °C)   Resp 19   Ht 5' 9\" (1.753 m)   Wt 131.5 kg (290 lb)   SpO2 97%   BMI 42.83 kg/m²     No intake or output data in the 24 hours ending 08/28/21 0557     General Appearance: Well developed, severely obese in no acute respiratory distress. Chest: Lungs clear to auscultation bilaterally. Cardiovascular: JVP PMI are inappreciable secondary to body habitus, normal intensity S1 and S2, without S3. Abdomen: Soft, non-tender, bowel sounds are active. Extremities: No edema bilaterally. Data Review    Telemetry: Sinus rhythm without ventricular ectopy    EKG:   []  No new EKG for review    Lab Data Personally Reviewed:    Recent Labs     08/27/21  0359 08/26/21  1230   WBC 7.6 10.0   HGB 10.8* 11.7   HCT 33.8* 36.4    360     Recent Labs     08/26/21  1230   INR 1.1   PTP 13.6   APTT 29.8      Recent Labs     08/27/21  0359 08/26/21  1230    139   K 3.6 3.9    106   CO2 26 30   BUN 13 13   CREA 0.60 0.76   GLU 81 86   CA 8.4* 8.9     Recent Labs     08/26/21  1230   TROIQ <0.05     No results found for: CHOL, CHOLX, CHLST, CHOLV, HDL, HDLP, LDL, LDLC, DLDLP, TGLX, TRIGL, TRIGP, CHHD, CHHDX    Recent Labs     08/27/21  0359 08/26/21  1230   AP 32* 37*   TP 6.5 7.4   ALB 2.7* 3.2*   GLOB 3.8 4.2*     No results for input(s): PH, PCO2, PO2 in the last 72 hours. Assessment/Plan:   1. Continue telemetry monitor  2. Continue to monitor serum electrolytes, renal function  3. Obtain head/brain MRI as plain  4. Continue current cardiovascular medications include aspirin, atorvastatin, and enoxaparin  5.   Further recommendations depending on above results, and clinical course     Richard King MD

## 2021-08-28 NOTE — PROGRESS NOTES
CM spoke with patient about therapy recommendations for IRF. Patient declines IRF. CM explained HH vs. Outpatient PT/OT. Patient wants outpatient therapy and understands that she will follow-up with her PCP office to have it arranged. No further needs from CM at this time.

## 2021-08-29 LAB
ALBUMIN SERPL-MCNC: 3 G/DL (ref 3.5–5)
ALBUMIN/GLOB SERPL: 0.8 {RATIO} (ref 1.1–2.2)
ALP SERPL-CCNC: 33 U/L (ref 45–117)
ALT SERPL-CCNC: 26 U/L (ref 12–78)
ANION GAP SERPL CALC-SCNC: 5 MMOL/L (ref 5–15)
AST SERPL W P-5'-P-CCNC: 19 U/L (ref 15–37)
BASOPHILS # BLD: 0 K/UL (ref 0–0.1)
BASOPHILS NFR BLD: 1 % (ref 0–1)
BILIRUB SERPL-MCNC: 0.2 MG/DL (ref 0.2–1)
BUN SERPL-MCNC: 7 MG/DL (ref 6–20)
BUN/CREAT SERPL: 9 (ref 12–20)
CA-I BLD-MCNC: 8.8 MG/DL (ref 8.5–10.1)
CHLORIDE SERPL-SCNC: 109 MMOL/L (ref 97–108)
CO2 SERPL-SCNC: 25 MMOL/L (ref 21–32)
CREAT SERPL-MCNC: 0.75 MG/DL (ref 0.55–1.02)
DIFFERENTIAL METHOD BLD: ABNORMAL
EOSINOPHIL # BLD: 0.1 K/UL (ref 0–0.4)
EOSINOPHIL NFR BLD: 1 % (ref 0–7)
ERYTHROCYTE [DISTWIDTH] IN BLOOD BY AUTOMATED COUNT: 13.4 % (ref 11.5–14.5)
GLOBULIN SER CALC-MCNC: 3.9 G/DL (ref 2–4)
GLUCOSE BLD STRIP.AUTO-MCNC: 90 MG/DL (ref 65–117)
GLUCOSE SERPL-MCNC: 84 MG/DL (ref 65–100)
HCT VFR BLD AUTO: 35.4 % (ref 35–47)
HGB BLD-MCNC: 11.2 G/DL (ref 11.5–16)
IMM GRANULOCYTES # BLD AUTO: 0 K/UL (ref 0–0.04)
IMM GRANULOCYTES NFR BLD AUTO: 0 % (ref 0–0.5)
LYMPHOCYTES # BLD: 1.1 K/UL (ref 0.8–3.5)
LYMPHOCYTES NFR BLD: 21 % (ref 12–49)
MCH RBC QN AUTO: 27.9 PG (ref 26–34)
MCHC RBC AUTO-ENTMCNC: 31.6 G/DL (ref 30–36.5)
MCV RBC AUTO: 88.1 FL (ref 80–99)
MONOCYTES # BLD: 1.1 K/UL (ref 0–1)
MONOCYTES NFR BLD: 22 % (ref 5–13)
NEUTS SEG # BLD: 2.9 K/UL (ref 1.8–8)
NEUTS SEG NFR BLD: 55 % (ref 32–75)
NRBC # BLD: 0 K/UL (ref 0–0.01)
NRBC BLD-RTO: 0 PER 100 WBC
PERFORMED BY, TECHID: NORMAL
PLATELET # BLD AUTO: 270 K/UL (ref 150–400)
PMV BLD AUTO: 10 FL (ref 8.9–12.9)
POTASSIUM SERPL-SCNC: 3.6 MMOL/L (ref 3.5–5.1)
PROT SERPL-MCNC: 6.9 G/DL (ref 6.4–8.2)
RBC # BLD AUTO: 4.02 M/UL (ref 3.8–5.2)
SODIUM SERPL-SCNC: 139 MMOL/L (ref 136–145)
WBC # BLD AUTO: 5.3 K/UL (ref 3.6–11)

## 2021-08-29 PROCEDURE — 65270000029 HC RM PRIVATE

## 2021-08-29 PROCEDURE — 97116 GAIT TRAINING THERAPY: CPT

## 2021-08-29 PROCEDURE — 74011250637 HC RX REV CODE- 250/637: Performed by: PSYCHIATRY & NEUROLOGY

## 2021-08-29 PROCEDURE — 82962 GLUCOSE BLOOD TEST: CPT

## 2021-08-29 PROCEDURE — 36415 COLL VENOUS BLD VENIPUNCTURE: CPT

## 2021-08-29 PROCEDURE — 85025 COMPLETE CBC W/AUTO DIFF WBC: CPT

## 2021-08-29 PROCEDURE — 80053 COMPREHEN METABOLIC PANEL: CPT

## 2021-08-29 PROCEDURE — 74011250637 HC RX REV CODE- 250/637: Performed by: FAMILY MEDICINE

## 2021-08-29 PROCEDURE — 74011250636 HC RX REV CODE- 250/636: Performed by: FAMILY MEDICINE

## 2021-08-29 RX ADMIN — ASPIRIN 325 MG ORAL TABLET 325 MG: 325 PILL ORAL at 08:00

## 2021-08-29 RX ADMIN — BUTALBITAL, ACETAMINOPHEN, AND CAFFEINE 1 TABLET: 50; 325; 40 TABLET ORAL at 21:56

## 2021-08-29 RX ADMIN — ENOXAPARIN SODIUM 40 MG: 40 INJECTION SUBCUTANEOUS at 08:00

## 2021-08-29 RX ADMIN — ACETAMINOPHEN 650 MG: 325 TABLET ORAL at 21:56

## 2021-08-29 RX ADMIN — ACETAMINOPHEN 650 MG: 325 TABLET ORAL at 08:00

## 2021-08-29 RX ADMIN — TOPIRAMATE 25 MG: 25 CAPSULE, COATED PELLETS ORAL at 22:47

## 2021-08-29 RX ADMIN — FLUTICASONE PROPIONATE 2 SPRAY: 50 SPRAY, METERED NASAL at 09:00

## 2021-08-29 RX ADMIN — ATORVASTATIN CALCIUM 40 MG: 40 TABLET, FILM COATED ORAL at 21:56

## 2021-08-29 NOTE — PROGRESS NOTES
General Daily Progress Note          Patient Name:   Tramaine Loyola       YOB: 1990       Age:  27 y.o. Admit Date: 8/26/2021      Subjective:         Left upper extremity weakness/much better        Objective:     Visit Vitals  /73   Pulse 86   Temp (!) 100.6 °F (38.1 °C)   Resp 18   Ht 5' 9\" (1.753 m)   Wt 131.5 kg (290 lb)   SpO2 100%   BMI 42.83 kg/m²        Recent Results (from the past 24 hour(s))   GLUCOSE, POC    Collection Time: 08/29/21  7:48 AM   Result Value Ref Range    Glucose (POC) 90 65 - 117 mg/dL    Performed by [facility]ANNA    CBC WITH AUTOMATED DIFF    Collection Time: 08/29/21  8:15 AM   Result Value Ref Range    WBC 5.3 3.6 - 11.0 K/uL    RBC 4.02 3.80 - 5.20 M/uL    HGB 11.2 (L) 11.5 - 16.0 g/dL    HCT 35.4 35.0 - 47.0 %    MCV 88.1 80.0 - 99.0 FL    MCH 27.9 26.0 - 34.0 PG    MCHC 31.6 30.0 - 36.5 g/dL    RDW 13.4 11.5 - 14.5 %    PLATELET 028 305 - 799 K/uL    MPV 10.0 8.9 - 12.9 FL    NRBC 0.0 0.0  WBC    ABSOLUTE NRBC 0.00 0.00 - 0.01 K/uL    NEUTROPHILS 55 32 - 75 %    LYMPHOCYTES 21 12 - 49 %    MONOCYTES 22 (H) 5 - 13 %    EOSINOPHILS 1 0 - 7 %    BASOPHILS 1 0 - 1 %    IMMATURE GRANULOCYTES 0 0 - 0.5 %    ABS. NEUTROPHILS 2.9 1.8 - 8.0 K/UL    ABS. LYMPHOCYTES 1.1 0.8 - 3.5 K/UL    ABS. MONOCYTES 1.1 (H) 0.0 - 1.0 K/UL    ABS. EOSINOPHILS 0.1 0.0 - 0.4 K/UL    ABS. BASOPHILS 0.0 0.0 - 0.1 K/UL    ABS. IMM.  GRANS. 0.0 0.00 - 0.04 K/UL    DF AUTOMATED     METABOLIC PANEL, COMPREHENSIVE    Collection Time: 08/29/21  8:15 AM   Result Value Ref Range    Sodium 139 136 - 145 mmol/L    Potassium 3.6 3.5 - 5.1 mmol/L    Chloride 109 (H) 97 - 108 mmol/L    CO2 25 21 - 32 mmol/L    Anion gap 5 5 - 15 mmol/L    Glucose 84 65 - 100 mg/dL    BUN 7 6 - 20 mg/dL    Creatinine 0.75 0.55 - 1.02 mg/dL    BUN/Creatinine ratio 9 (L) 12 - 20      GFR est AA >60 >60 ml/min/1.73m2    GFR est non-AA >60 >60 ml/min/1.73m2    Calcium 8.8 8.5 - 10.1 mg/dL    Bilirubin, total 0.2 0.2 - 1.0 mg/dL    AST (SGOT) 19 15 - 37 U/L    ALT (SGPT) 26 12 - 78 U/L    Alk. phosphatase 33 (L) 45 - 117 U/L    Protein, total 6.9 6.4 - 8.2 g/dL    Albumin 3.0 (L) 3.5 - 5.0 g/dL    Globulin 3.9 2.0 - 4.0 g/dL    A-G Ratio 0.8 (L) 1.1 - 2.2       [unfilled]      Review of Systems    Constitutional: Negative for chills and fever. HENT: Negative. Eyes: Negative. Respiratory: Negative. Cardiovascular: Negative. Gastrointestinal: Negative for abdominal pain and nausea. Skin: Negative. Neurological: Negative. Physical Exam:      Constitutional: pt is oriented to person, place, and time. HENT:   Head: Normocephalic and atraumatic. Eyes: Pupils are equal, round, and reactive to light. EOM are normal.   Cardiovascular: Normal rate, regular rhythm and normal heart sounds. Pulmonary/Chest: Breath sounds normal. No wheezes. No rales. Exhibits no tenderness. Abdominal: Soft. Bowel sounds are normal. There is no abdominal tenderness. There is no rebound and no guarding. Musculoskeletal: Normal range of motion. Neurological: pt is alert and oriented to person, place, and time. DUPLEX CAROTID BILATERAL   Final Result      CTA CODE NEURO HEAD AND NECK W CONT   Final Result   No occlusion or high-grade stenosis of the major cervical or intracranial   arterial vasculature. Please note that degrees of carotid stenosis are measured in accordance with   NASCET criteria. Report sent to the referring ER at the time of dictation. CT CODE NEURO HEAD WO CONTRAST   Final Result   1. No acute large vessel intracranial ischemia, hemorrhage. Called report to Dr. Trenton Krueger at 12:07 PM 8/26/2021.       MRI BRAIN W WO CONT    (Results Pending)        Recent Results (from the past 24 hour(s))   GLUCOSE, POC    Collection Time: 08/29/21  7:48 AM   Result Value Ref Range    Glucose (POC) 90 65 - 117 mg/dL    Performed by [facility]ADELA[facility]    CBC WITH AUTOMATED DIFF    Collection Time: 08/29/21  8:15 AM   Result Value Ref Range    WBC 5.3 3.6 - 11.0 K/uL    RBC 4.02 3.80 - 5.20 M/uL    HGB 11.2 (L) 11.5 - 16.0 g/dL    HCT 35.4 35.0 - 47.0 %    MCV 88.1 80.0 - 99.0 FL    MCH 27.9 26.0 - 34.0 PG    MCHC 31.6 30.0 - 36.5 g/dL    RDW 13.4 11.5 - 14.5 %    PLATELET 640 925 - 848 K/uL    MPV 10.0 8.9 - 12.9 FL    NRBC 0.0 0.0  WBC    ABSOLUTE NRBC 0.00 0.00 - 0.01 K/uL    NEUTROPHILS 55 32 - 75 %    LYMPHOCYTES 21 12 - 49 %    MONOCYTES 22 (H) 5 - 13 %    EOSINOPHILS 1 0 - 7 %    BASOPHILS 1 0 - 1 %    IMMATURE GRANULOCYTES 0 0 - 0.5 %    ABS. NEUTROPHILS 2.9 1.8 - 8.0 K/UL    ABS. LYMPHOCYTES 1.1 0.8 - 3.5 K/UL    ABS. MONOCYTES 1.1 (H) 0.0 - 1.0 K/UL    ABS. EOSINOPHILS 0.1 0.0 - 0.4 K/UL    ABS. BASOPHILS 0.0 0.0 - 0.1 K/UL    ABS. IMM. GRANS. 0.0 0.00 - 0.04 K/UL    DF AUTOMATED     METABOLIC PANEL, COMPREHENSIVE    Collection Time: 08/29/21  8:15 AM   Result Value Ref Range    Sodium 139 136 - 145 mmol/L    Potassium 3.6 3.5 - 5.1 mmol/L    Chloride 109 (H) 97 - 108 mmol/L    CO2 25 21 - 32 mmol/L    Anion gap 5 5 - 15 mmol/L    Glucose 84 65 - 100 mg/dL    BUN 7 6 - 20 mg/dL    Creatinine 0.75 0.55 - 1.02 mg/dL    BUN/Creatinine ratio 9 (L) 12 - 20      GFR est AA >60 >60 ml/min/1.73m2    GFR est non-AA >60 >60 ml/min/1.73m2    Calcium 8.8 8.5 - 10.1 mg/dL    Bilirubin, total 0.2 0.2 - 1.0 mg/dL    AST (SGOT) 19 15 - 37 U/L    ALT (SGPT) 26 12 - 78 U/L    Alk. phosphatase 33 (L) 45 - 117 U/L    Protein, total 6.9 6.4 - 8.2 g/dL    Albumin 3.0 (L) 3.5 - 5.0 g/dL    Globulin 3.9 2.0 - 4.0 g/dL    A-G Ratio 0.8 (L) 1.1 - 2.2         Results     ** No results found for the last 336 hours.  **           Labs:     Recent Labs     08/29/21  0815 08/27/21  0359   WBC 5.3 7.6   HGB 11.2* 10.8*   HCT 35.4 33.8*    297     Recent Labs     08/29/21  0815 08/27/21  0359    139   K 3.6 3.6   * 106   CO2 25 26   BUN 7 13   CREA 0.75 0.60   GLU 84 81   CA 8.8 8.4*     Recent Labs 08/29/21  0815 08/27/21  0359   ALT 26 14   AP 33* 32*   TBILI 0.2 0.3   TP 6.9 6.5   ALB 3.0* 2.7*   GLOB 3.9 3.8     No results for input(s): INR, PTP, APTT, INREXT, INREXT in the last 72 hours. No results for input(s): FE, TIBC, PSAT, FERR in the last 72 hours. No results found for: FOL, RBCF   No results for input(s): PH, PCO2, PO2 in the last 72 hours. No results for input(s): CPK, CKNDX, TROIQ in the last 72 hours. No lab exists for component: CPKMB  No results found for: CHOL, CHOLX, CHLST, CHOLV, HDL, HDLP, LDL, LDLC, DLDLP, TGLX, TRIGL, TRIGP, CHHD, CHHDX  Lab Results   Component Value Date/Time    Glucose (POC) 90 08/29/2021 07:48 AM     No results found for: COLOR, APPRN, SPGRU, REFSG, BENNIE, PROTU, GLUCU, KETU, BILU, UROU, PRANAV, LEUKU, GLUKE, EPSU, BACTU, WBCU, RBCU, CASTS, UCRY      Assessment:     Acute CVA with left-sided weakness  Headache  Morbid obesity  History of asthma  Ongoing smoking  Smoked marijuana    · LV: Estimated LVEF is 55 - 60%. Normal cavity size, wall thickness, systolic function (ejection fraction normal) and diastolic function. · IAS: Agitated saline contrast study was performed. · Saline contrast was given to evaluate for intracardiac shunt. · Antegrade vertebral artery flow b/l  · No evidence of carotid artery stenosis by PSV or Velocity ratio.   Plan:     Continue aspirin Lipitor  Patient started on Depacon for headache        PT OT consult    Discharge planning to inpatient rehab    MRI pending  Discharge to rehab after MRI    Current Facility-Administered Medications:     butalbital-acetaminophen-caffeine (FIORICET, ESGIC) -40 mg per tablet 1 Tablet, 1 Tablet, Oral, Q6H PRN, Arminda Garcia MD, 1 Tablet at 08/28/21 0725    topiramate (TOPAMAX) sprinkle capsule 25 mg, 25 mg, Oral, Q12H, Tanwi IV, T. Fonseca, MD, 25 mg at 08/28/21 1144    acetaminophen (TYLENOL) tablet 650 mg, 650 mg, Oral, Q4H PRN, Arminda Garcia MD, 650 mg at 08/29/21 0800    albuterol (PROVENTIL HFA, VENTOLIN HFA, PROAIR HFA) inhaler 2 Puff, 2 Puff, Inhalation, Q4H PRN, Enrico Garcia MD    fluticasone propionate (FLONASE) 50 mcg/actuation nasal spray 2 Spray, 2 Spray, Both Nostrils, DAILY, Arminda Garcia MD, 2 [place] at 08/29/21 0900    0.9% sodium chloride infusion, 75 mL/hr, IntraVENous, CONTINUOUS, Arminda Garcia MD, Last Rate: 75 mL/hr at 08/28/21 0724, 75 mL/hr at 08/28/21 0724    polyethylene glycol (MIRALAX) packet 17 g, 17 g, Oral, DAILY PRN, Enrico Garcia MD    ondansetron (ZOFRAN ODT) tablet 4 mg, 4 mg, Oral, Q8H PRN **OR** ondansetron (ZOFRAN) injection 4 mg, 4 mg, IntraVENous, Q6H PRN, Arminda Garcia MD    enoxaparin (LOVENOX) injection 40 mg, 40 mg, SubCUTAneous, DAILY, Arminda Garcia MD, 40 mg at 08/29/21 0800    aspirin tablet 325 mg, 325 mg, Oral, DAILY, Arminda Garcia MD, 325 mg at 08/29/21 0800    atorvastatin (LIPITOR) tablet 40 mg, 40 mg, Oral, QHS, Arminda Garcia MD, 40 mg at 08/28/21 4566

## 2021-08-29 NOTE — PROGRESS NOTES
Problem: Mobility Impaired (Adult and Pediatric)  Goal: *Acute Goals and Plan of Care (Insert Text)  Description: Pt will be I with LE HEP in 7 days. Pt will perform bed mobility with mod I in 7 days. Pt will perform transfers with SBA in 7 days. Pt will amb -10 feet with LRAD safely with Yared in 7 days. Pt will demonstrate improvement in standing dynamic balance from poor to fair in 7 days. Outcome: Progressing Towards Goal   PHYSICAL THERAPY TREATMENT  Patient: Gilberto Asher (89 y.o. female)  Date: 8/29/2021  Diagnosis: CVA (cerebral vascular accident) [facility]) [I63.9] <principal problem not specified>       Precautions: Fall  Chart, physical therapy assessment, plan of care and goals were reviewed. ASSESSMENT  Patient continues with skilled PT services and is progressing towards goals. Nursing reported that patient was being noncompliant with wearing tele box, wanting to escape from the hospital, and walking around the halls on her own last night as well as reported that patient's mom gave her a bath last night when visiting. Therapist entered room and patient was sleeping but easily awoke and agreeable to work with PT. Patient stated that she has been working on her exercises and can now move her LUE and LLE without pain, just weakness. Patient was able to lift LUE in all planes as well as LLE. Bed mobility performed mod I with incr time and use of bed rails. Sit<>stand with jonathan walker and SBA for safety. Patient ambulated out into hallway with jonathan-walker and SBA for 150 feet x 1, slow patricia, decr LUE arm swing and no LOB or buckling noted. She may do better ambulating with a cane as the jonathan-walker may have slowed her down some. She then returned to room to sit up in recliner and eat breakfast. She will benefit from continued skilled PT services to improve her strength and endurance for longer gait distances.  Patient is refusing to D/C to IRF for more therapy and is insisting on going home to take care of her son (her mother lives there too). Educated patient on benefit of discharging to rehab first but she continues to decline. Patient is improving overall and with more steady progress, she may be safe enough to go home with home health therapy. Current Level of Function Impacting Discharge (mobility/balance): decr endurance, decr mobility, AD for gait    Other factors to consider for discharge: obese, family history of CVA, refusing IRF          PLAN :  Patient continues to benefit from skilled intervention to address the above impairments. Continue treatment per established plan of care. to address goals. Recommendation for discharge: (in order for the patient to meet his/her long term goals)  Therapy 3 hours per day 5-7 days per week versus HHPT pending progress with therapy     This discharge recommendation:  Has been made in collaboration with the attending provider and/or case management    IF patient discharges home will need the following DME: to be determined (TBD) May need a cane of some sort        SUBJECTIVE:   Patient stated I can't wait to show you how well I am doing. I can move my L arm and L leg now and I walked around the hallways last night and tried to escape out the elevator but some man caught me.     OBJECTIVE DATA SUMMARY:   Critical Behavior:  Neurologic State: Alert  Orientation Level: Oriented X4  Cognition: Appropriate for age attention/concentration, Appropriate safety awareness, Follows commands  Safety/Judgement: Fall prevention  Functional Mobility Training:  Bed Mobility:  Rolling: Modified independent  Supine to Sit: Modified independent     Scooting: Modified independent        Transfers:  Sit to Stand: Stand-by assistance  Stand to Sit: Stand-by assistance        Bed to Chair: Stand-by assistance                    Balance:  Sitting: Intact  Standing: Intact; With support  Standing - Static: Good;Constant support  Standing - Dynamic : Good;Constant support  Ambulation/Gait Training:  Distance (ft): 150 Feet (ft)  Assistive Device: Gait belt;Walker jonathan  Ambulation - Level of Assistance: Stand-by assistance     May trial with cane tomorrow versus jonathan walker     Therapeutic Exercises:   Not today   Pain Rating:  No pain     Activity Tolerance:   Fair for patient's age but her tolerance is much improved from yesterday's session   Please refer to the flowsheet for vital signs taken during this treatment. After treatment patient left in no apparent distress:   Sitting in chair, Call bell within reach, and nursing aware of patient participation with therapy and positioning at end of session     COMMUNICATION/COLLABORATION:   The patients plan of care was discussed with: Physical therapist and Registered nurse.      Karen Crocker   Time Calculation: 24 mins

## 2021-08-29 NOTE — PROGRESS NOTES
Pt removed her tele box during the night and refuses to put it back on. Pt is also refusing he continuous fluids. Provided education of importance. Pt is also requesting a shower. Spoke with PT and left a message for  to see if that would be a possibility to get pt in the shower.

## 2021-08-29 NOTE — PROGRESS NOTES
Pt was found turning on her IV and hooking it up herself. Educated pt on the safety of not doing those things on her own that she needs to allow the nurse to do them to prevent any infections. No carry over observed. Will continue to provide education.

## 2021-08-30 ENCOUNTER — APPOINTMENT (OUTPATIENT)
Dept: MRI IMAGING | Age: 31
DRG: 045 | End: 2021-08-30
Attending: FAMILY MEDICINE
Payer: COMMERCIAL

## 2021-08-30 VITALS
TEMPERATURE: 98.4 F | HEART RATE: 73 BPM | OXYGEN SATURATION: 99 % | RESPIRATION RATE: 16 BRPM | SYSTOLIC BLOOD PRESSURE: 97 MMHG | WEIGHT: 290 LBS | DIASTOLIC BLOOD PRESSURE: 61 MMHG | HEIGHT: 69 IN | BODY MASS INDEX: 42.95 KG/M2

## 2021-08-30 LAB
ATRIAL RATE: 73 BPM
CALCULATED P AXIS, ECG09: 36 DEGREES
CALCULATED R AXIS, ECG10: 4 DEGREES
CALCULATED T AXIS, ECG11: 27 DEGREES
DIAGNOSIS, 93000: NORMAL
P-R INTERVAL, ECG05: 130 MS
Q-T INTERVAL, ECG07: 388 MS
QRS DURATION, ECG06: 80 MS
QTC CALCULATION (BEZET), ECG08: 427 MS
VENTRICULAR RATE, ECG03: 73 BPM

## 2021-08-30 PROCEDURE — 74011250637 HC RX REV CODE- 250/637: Performed by: FAMILY MEDICINE

## 2021-08-30 PROCEDURE — 97530 THERAPEUTIC ACTIVITIES: CPT

## 2021-08-30 PROCEDURE — 74011250636 HC RX REV CODE- 250/636: Performed by: FAMILY MEDICINE

## 2021-08-30 RX ORDER — BUTALBITAL, ACETAMINOPHEN AND CAFFEINE 50; 325; 40 MG/1; MG/1; MG/1
1 TABLET ORAL
Qty: 30 TABLET | Refills: 0 | Status: SHIPPED | OUTPATIENT
Start: 2021-08-30

## 2021-08-30 RX ORDER — ASPIRIN 325 MG
325 TABLET ORAL DAILY
Qty: 30 TABLET | Refills: 0 | Status: SHIPPED | OUTPATIENT
Start: 2021-08-31

## 2021-08-30 RX ORDER — TOPIRAMATE 25 MG/1
25 CAPSULE, COATED PELLETS ORAL EVERY 12 HOURS
Qty: 30 CAPSULE | Refills: 0 | Status: SHIPPED | OUTPATIENT
Start: 2021-08-30

## 2021-08-30 RX ORDER — ATORVASTATIN CALCIUM 40 MG/1
40 TABLET, FILM COATED ORAL
Qty: 30 TABLET | Refills: 0 | Status: SHIPPED | OUTPATIENT
Start: 2021-08-30

## 2021-08-30 RX ADMIN — FLUTICASONE PROPIONATE 2 SPRAY: 50 SPRAY, METERED NASAL at 09:00

## 2021-08-30 RX ADMIN — ACETAMINOPHEN 650 MG: 325 TABLET ORAL at 09:24

## 2021-08-30 RX ADMIN — ENOXAPARIN SODIUM 40 MG: 40 INJECTION SUBCUTANEOUS at 11:47

## 2021-08-30 RX ADMIN — ASPIRIN 325 MG ORAL TABLET 325 MG: 325 PILL ORAL at 09:24

## 2021-08-30 NOTE — PROGRESS NOTES
Discharge plan of care/case management plan validated with provider discharge order. Reviewed d/c planning with pt in detail. Pt wheeled to main entrance via wheelchair with all belongings. Pt's uncle picked her up.

## 2021-08-30 NOTE — MED STUDENT NOTES
*ATTENTION:  This note has been created by a medical student for educational purposes only. Please do not refer to the content of this note for clinical decision-making, billing, or other purposes. Please see attending physicians note to obtain clinical information on this patient. *        Hospitalist Progress Note    Subjective:   Daily Progress Note: 8/30/2021 11:27 AM    27 y.o. female patient with hx of asthma, obesity, and chronic smoking presents with 2-week hx of headache in frontal throbbing, non-radiating, no aggravating or relieving factor. Patient experienced L-sided weakness of UE and LE. CT of head negative for acute stroke, significant UE weakness. ~~~~~~~~~~~~~~~~~~~~~~~~~~~~~~~~~~~~~~~~~~~~~~~~~~~~~~~~~~~~~~~~~~~~~~~~~    Patient seen today sitting in chair, on room air, no acute distress. Patient feels that strength is improving. Carotid duplex normal. LVEF 55-60%  MRI pending    Patient tracks well, speech appropriate, alert. Patient has strong hand  on 2-fingers, weaker on the L hand. Patient UE strength 5/5 on R and 4/5 on L. Patient notes that she would like HHPT with cycling and stretch ball. Good urine and bowel movement, NS IV infusion seems to help. Fluids d/c'd due to increased urine and diarrhea.     Dispo pending PT    Current Facility-Administered Medications   Medication Dose Route Frequency    butalbital-acetaminophen-caffeine (FIORICET, ESGIC) -40 mg per tablet 1 Tablet  1 Tablet Oral Q6H PRN    topiramate (TOPAMAX) sprinkle capsule 25 mg  25 mg Oral Q12H    acetaminophen (TYLENOL) tablet 650 mg  650 mg Oral Q4H PRN    albuterol (PROVENTIL HFA, VENTOLIN HFA, PROAIR HFA) inhaler 2 Puff  2 Puff Inhalation Q4H PRN    fluticasone propionate (FLONASE) 50 mcg/actuation nasal spray 2 Spray  2 Spray Both Nostrils DAILY    0.9% sodium chloride infusion  75 mL/hr IntraVENous CONTINUOUS    polyethylene glycol (MIRALAX) packet 17 g  17 g Oral DAILY PRN    ondansetron (ZOFRAN ODT) tablet 4 mg  4 mg Oral Q8H PRN    Or    ondansetron (ZOFRAN) injection 4 mg  4 mg IntraVENous Q6H PRN    enoxaparin (LOVENOX) injection 40 mg  40 mg SubCUTAneous DAILY    aspirin tablet 325 mg  325 mg Oral DAILY    atorvastatin (LIPITOR) tablet 40 mg  40 mg Oral QHS        Review of Systems  Neurological: positive for weakness L UE  All other systems negative except for what is noted in HPI    Objective:     Visit Vitals  BP 97/61   Pulse 73   Temp 98.4 °F (36.9 °C)   Resp 16   Ht 5' 9\" (1.753 m)   Wt 290 lb (131.5 kg)   SpO2 99%   BMI 42.83 kg/m²      O2 Device: None (Room air)    Temp (24hrs), Av.7 °F (37.1 °C), Min:98.4 °F (36.9 °C), Max:98.9 °F (37.2 °C)      Data Review    No results found for this or any previous visit (from the past 24 hour(s)). Physical Exam  Constitutional:       Appearance: Normal appearance. HENT:      Right Ear: External ear normal.      Left Ear: External ear normal.      Nose: Nose normal.      Mouth/Throat:      Pharynx: Oropharynx is clear. Eyes:      Conjunctiva/sclera: Conjunctivae normal.   Musculoskeletal:         General: Normal range of motion. Comments: Mild weakness in L UE   Skin:     General: Skin is warm and dry. Neurological:      Mental Status: She is alert. Mental status is at baseline. Psychiatric:         Mood and Affect: Mood normal.         Behavior: Behavior normal.         Thought Content: Thought content normal.         Judgment: Judgment normal.       DUPLEX CAROTID BILATERAL   Final Result      CTA CODE NEURO HEAD AND NECK W CONT   Final Result   No occlusion or high-grade stenosis of the major cervical or intracranial   arterial vasculature. Please note that degrees of carotid stenosis are measured in accordance with   NASCET criteria. Report sent to the referring ER at the time of dictation. CT CODE NEURO HEAD WO CONTRAST   Final Result   1. No acute large vessel intracranial ischemia, hemorrhage. Called report to Dr. Joanna Humphries at 12:07 PM 8/26/2021. Assessment     Active Problems:    CVA (cerebral vascular accident) ([facility] [address] (8/26/2021)  MRI pending  Carotid duplex negative    Headache  Morbid obesity  Hx of asthma  Chronic, ongoing smoking  Hx of Marijuana use    Plan:    NS IVF 0.9% NaCl, 75 mL/hr    Aspirin tablet 325 mg, oral, daily at 0900  Atorvastatin (Lipitor) tab 40 mg, oral, every bedtime  Enoxaparin (Lovenox) inj 40 mg, subcut, daily  Fluticasone propionate (Flonase) 50 mcg/actuation nasal spray 2 spray, both nostrils, daily at 0900  Topiramate (topamax) sprinkle capsule 25 mg, oral, Q12hrs, at 1300    Continue PT. Plan pending PT recs. Monitor patient.     Care Plan discussed with: Patient

## 2021-08-30 NOTE — DISCHARGE SUMMARY
Discharge Summary       PATIENT ID: Patsy Patterson  MRN: [id number]   YOB: 1990    DATE OF ADMISSION: 8/26/2021 11:33 AM    DATE OF DISCHARGE:   PRIMARY CARE PROVIDER: Nicholas Levin NP     ATTENDING PHYSICIAN: Amy Ocampo  DISCHARGING PROVIDER: Jillian Garcia      CONSULTATIONS: IP CONSULT TO NEUROLOGY  IP CONSULT TO CARDIOLOGY    PROCEDURES/SURGERIES: * No surgery found *    ADMITTING DIAGNOSES:    Patient Active Problem List    Diagnosis Date Noted    CVA (cerebral vascular accident) ([facility] [address] 08/26/2021       DISCHARGE DIAGNOSES / PLAN:       Acute CVA with left-sided weakness  Headache  Morbid obesity  History of asthma  Ongoing smoking  Smoked marijuana     · LV: Estimated LVEF is 55 - 60%. Normal cavity size, wall thickness, systolic function (ejection fraction normal) and diastolic function. · IAS: Agitated saline contrast study was performed. · Saline contrast was given to evaluate for intracardiac shunt.     · Antegrade vertebral artery flow b/l  No evidence of carotid artery stenosis by PSV or Velocity ratio. DISCHARGE MEDICATIONS:  Current Discharge Medication List      START taking these medications    Details   aspirin (ASPIRIN) 325 mg tablet Take 1 Tablet by mouth daily. Qty: 30 Tablet, Refills: 0  Start date: 8/31/2021      atorvastatin (LIPITOR) 40 mg tablet Take 1 Tablet by mouth nightly. Qty: 30 Tablet, Refills: 0  Start date: 8/30/2021      butalbital-acetaminophen-caffeine (FIORICET, ESGIC) -40 mg per tablet Take 1 Tablet by mouth every six (6) hours as needed for Headache. Qty: 30 Tablet, Refills: 0  Start date: 8/30/2021      topiramate (TOPAMAX) 25 mg sprinkle capsule Take 1 Capsule by mouth every twelve (12) hours.   Qty: 30 Capsule, Refills: 0  Start date: 8/30/2021         CONTINUE these medications which have NOT CHANGED    Details   albuterol (PROVENTIL HFA, VENTOLIN HFA, PROAIR HFA) 90 mcg/actuation inhaler Take 2 Puffs by inhalation every four (4) hours as needed for Wheezing. Qty: 1 Inhaler, Refills: 0      fluticasone propionate (FLONASE) 50 mcg/actuation nasal spray 2 Sprays by Both Nostrils route daily. Qty: 1 Bottle, Refills: 0         STOP taking these medications       azithromycin (Zithromax Z-Stephan) 250 mg tablet Comments:   Reason for Stopping:         dextromethorphan-guaiFENesin (Robitussin Cough-Chest Uriel DM) 5-100 mg/5 mL liqd Comments:   Reason for Stopping:         sodium chloride (Ayr Saline) 0.65 % drop Comments:   Reason for Stopping:         predniSONE (DELTASONE) 20 mg tablet Comments:   Reason for Stopping:                 NOTIFY YOUR PHYSICIAN FOR ANY OF THE FOLLOWING:   Fever over 101 degrees for 24 hours. Chest pain, shortness of breath, fever, chills, nausea, vomiting, diarrhea, change in mentation, falling, weakness, bleeding. Severe pain or pain not relieved by medications. Or, any other signs or symptoms that you may have questions about. DISPOSITION:  x  Home With:   OT  PT  HH  RN       Long term SNF/Inpatient Rehab    Independent/assisted living    Hospice    Other:       PATIENT CONDITION AT DISCHARGE: Stable      PHYSICAL EXAMINATION AT DISCHARGE:  General:          Alert, cooperative, no distress, appears stated age. HEENT:           Atraumatic, anicteric sclerae, pink conjunctivae                          No oral ulcers, mucosa moist, throat clear, dentition fair  Neck:               Supple, symmetrical  Lungs:             Clear to auscultation bilaterally. No Wheezing or Rhonchi. No rales. Chest wall:      No tenderness  No Accessory muscle use. Heart:              Regular  rhythm,  No  murmur   No edema  Abdomen:        Soft, non-tender. Not distended. Bowel sounds normal  Extremities:     No cyanosis. No clubbing,                            Skin turgor normal, Capillary refill normal  Skin:                Not pale. Not Jaundiced  No rashes   Psych:             Not anxious or agitated.   Neurologic: Alert, moves all extremities, answers questions appropriately and responds to commands     DUPLEX CAROTID BILATERAL   Final Result      CTA CODE NEURO HEAD AND NECK W CONT   Final Result   No occlusion or high-grade stenosis of the major cervical or intracranial   arterial vasculature. Please note that degrees of carotid stenosis are measured in accordance with   NASCET criteria. Report sent to the referring ER at the time of dictation. CT CODE NEURO HEAD WO CONTRAST   Final Result   1. No acute large vessel intracranial ischemia, hemorrhage. Called report to Dr. Jerzy Chinchilla at 12:07 PM 8/26/2021. No results found for this or any previous visit (from the past 24 hour(s)).        HOSPITAL COURSE:    History of present illness for several days and physical at the time of the admission of the patient admitted because of acute stroke with the sided weakness patient seen by the neurology CT scan of the head done which was negative echo was done showed ejection fraction of 50 to 60% and carotid Doppler was negative patient was received Depacon for the headache after physical therapy patient able to walk a moving her left extremity initial plan for went to inpatient rehab but patient feeling better want to go home with home health PT OT    MRI unable to be done in the hospital due to the patient weight can be done as an outpatient if needed    Medication cancellation and time pressure patient had 5-minute 50% time spent counseling coordination of care      Signed:   Tamara Luther MD  8/30/2021  1:13 PM

## 2021-08-30 NOTE — DISCHARGE INSTR - DIET
Discharge Instructions       PATIENT ID: Diann Hairston  MRN: [id number]   YOB: 1990    DATE OF ADMISSION: 8/26/2021 11:33 AM    DATE OF DISCHARGE: 8/30/2021    PRIMARY CARE PROVIDER: Alvaro Boston NP     ATTENDING PHYSICIAN: Kelsey Morgan MD  DISCHARGING PROVIDER: Mercedes Stock MD    To contact this individual call [phone] and ask the  to page. If unavailable ask to be transferred the Adult Hospitalist Department. DISCHARGE DIAGNOSES CVA    CONSULTATIONS: IP CONSULT TO NEUROLOGY  IP CONSULT TO CARDIOLOGY    PROCEDURES/SURGERIES: * No surgery found *    PENDING TEST RESULTS:   At the time of discharge the following test results are still pending: MRI    FOLLOW UP APPOINTMENTS:   Follow-up Information    None          ADDITIONAL CARE RECOMMENDATIONS: MRI    DIET: {Cardiac diet      ACTIVITY: Activity as tolerated    Wound care: {[facility] Wound Care Instructions:[id number]:::0}    EQUIPMENT needed: ***      DISCHARGE MEDICATIONS:   See Medication Reconciliation Form    It is important that you take the medication exactly as they are prescribed. Keep your medication in the bottles provided by the pharmacist and keep a list of the medication names, dosages, and times to be taken in your wallet. Do not take other medications without consulting your doctor. NOTIFY YOUR PHYSICIAN FOR ANY OF THE FOLLOWING:   Fever over 101 degrees for 24 hours. Chest pain, shortness of breath, fever, chills, nausea, vomiting, diarrhea, change in mentation, falling, weakness, bleeding. Severe pain or pain not relieved by medications. Or, any other signs or symptoms that you may have questions about.       DISPOSITION:    Home With:   OT  PT  HH  RN       SNF/Inpatient Rehab/LTAC    Independent/assisted living    Hospice    Other:         PROBLEM LIST Updated:  Yes Has       Signed:   Mercedes Stock MD  8/30/2021  1:11 PM

## 2021-08-30 NOTE — PROGRESS NOTES
OCCUPATIONAL THERAPY TREATMENT  Patient: Ying Lam (85 y.o. female)  Date: 8/30/2021  Diagnosis: CVA (cerebral vascular accident) [facility]) [I63.9] <principal problem not specified>       Precautions: Fall  Chart, occupational therapy assessment, plan of care, and goals were reviewed. ASSESSMENT  Patient continues with skilled OT services and is progressing towards goals. Pt. Received supine in bed sleeping on arrival  and agreeable to therapy session. Pt. Completed LB dressing while long sitting in bed. Pt. Performed bed mobility ind, good sitting balance while seated at EOB, Pt. Performed sit-> stand with SBA, functional ambulation with use of jonathan-walker with SBA, toilet transfer  SBA, toileting hygiene ind, pt able to complete grooming at sink for increased time while performing oral care, facial grooming, and hand hygiene with SBA. Pt. Completed UE therex while seated in chair. Pt demonstrated decreased fine motor skills on Left UE. Pt educated on UE therex for fine motor skills to practice throughout the day. Pt stated increased weakness and fatigue with use of LUE during oral care. Noted while pt seated in chair therapist observed pt using utensil to perform self feeding with left hand. Pt. Left seated in chair with needs met and call bell within reach. Current Level of Function Impacting Discharge (ADLs): general weakness on Left side, SBA for ambulation and transfers for safety. Other factors to consider for discharge: PLOF, time since on set         PLAN :  Patient continues to benefit from skilled intervention to address the above impairments. Continue treatment per established plan of care. to address goals.     Recommendation for discharge: (in order for the patient to meet his/her long term goals)  Therapy 3 hours per day 5-7 days per week    This discharge recommendation:  Has been made in collaboration with the attending provider and/or case management    IF patient discharges home will need the following DME: TBD       SUBJECTIVE:   Patient stated my left side is weak.     OBJECTIVE DATA SUMMARY:   Cognitive/Behavioral Status:  Neurologic State: Alert  Orientation Level: Oriented X4  Cognition: Appropriate decision making; Appropriate for age attention/concentration; Follows commands    Functional Mobility and Transfers for ADLs:  Bed Mobility:  Rolling: Independent  Supine to Sit: Independent  Scooting: Independent    Transfers:  Sit to Stand: Stand-by assistance  Functional Transfers  Bathroom Mobility: Stand-by assistance  Toilet Transfer : Stand-by assistance  Bed to Chair: Stand-by assistance    Balance:  Sitting: Intact  Standing: Intact; With support  Standing - Static: Constant support;Good  Standing - Dynamic : Constant support;Good    ADL Intervention:       Grooming  Grooming Assistance: Stand-by assistance  Position Performed: Standing  Washing Face: Stand-by assistance  Washing Hands: Stand-by assistance    Lower Body Dressing Assistance  Socks: Independent  Position Performed: Long sitting on bed    Toileting  Toileting Assistance: Stand-by assistance  Bladder Hygiene: Independent  Clothing Management: Stand-by assistance         Therapeutic Exercises: AZEEM UE's  Exercise Sets Reps AROM AAROM PROM Self PROM Comments   Shoulder flex/ext 1 10 [x] [] [] []    Elbow flex/ext 1 15 [x] [] [] []    Wrist flex/ext 1 15 [x] [] [] []    Picking up weighted object to increase strength in left hand  1 10 [x] [] [] []          Pain:  0/10  Activity Tolerance:   Good  Please refer to the flowsheet for vital signs taken during this treatment. After treatment patient left in no apparent distress:   Sitting in chair, Heels elevated for pressure relief, and Call bell within reach    COMMUNICATION/COLLABORATION:   The patients plan of care was discussed with: Registered nurse.      Jazzmine Byrne  Time Calculation: 45 mins    Problem: Self Care Deficits Care Plan (Adult)  Goal: *Acute Goals and Plan of Care (Insert Text)  Description: Pt will be mod I sup <> sit in prep for EOB ADLs  Pt will be I grooming sitting EOB  Pt will be min A LE dressing sitting EOB/long sit  Pt will be mod I sit <>  prep for toileting LRAD  Pt will be mod I toileting/toilet transfer/cloth mgmt LRAD  Pt will be I following UE HEP in prep for self care tasks   Outcome: Progressing Towards Goal

## 2021-08-30 NOTE — DISCHARGE INSTRUCTIONS
Cardiovascular Discharge Instructions    Patient Discharge    Kam Lee / [id number] : 1990    Admitted 2021 Discharged: 2021       When  [address]    If you have any of the following symptoms, call for emergency help immediately. The sooner you get help, the more doctors can do to prevent permanent damage. · Sudden weakness or numbness of the face, arm or leg on one side of the body   · Sudden dimness or loss of vision, particularly in one eye   · Loss of speech, trouble talking or understanding what others are saying   · Sudden severe headache with no known cause   · Unexplained dizziness, unstable walking or falling, especially along with any of the other symptoms    Diet    · You are on a low fat, low cholesterol, low sodium diet. You should continue this diet at home to help prevent future health problems. · Please contact your physician's office if you have any diet related questions. Medications    · It is important that you take the medication exactly as they are prescribed. · Keep your medication in the bottles provided by the pharmacist and keep a list of the medication names, dosages, and times to be taken in your wallet. · Do not take other medications without consulting your doctor. Patient Education        Taking Aspirin and Other Antiplatelets Safely: Care Instructions  Your Care Instructions     Aspirin and other antiplatelet medicines help prevent blood clots from forming. They can help some people lower their risk of a heart attack or stroke. But these medicines can also make you more likely to bleed. That's why it's important to talk to your doctor before you start taking aspirin every day. It's not right for everyone. And if you and your doctor decide these medicines are right for you, learn how to take them safely. If you take aspirin, be sure you know how to take it. Your doctor can tell you what dose to take and how often to take it.  One low-dose aspirin is 81 milligrams (mg). But the dose for daily aspirin can range from 81 mg to 325 mg. If you take another antiplatelet, take it as prescribed. Follow-up care is a key part of your treatment and safety. Be sure to make and go to all appointments, and call your doctor if you are having problems. It's also a good idea to know your test results and keep a list of the medicines you take. How can you care for yourself at home? · Before you start to take daily aspirin or some other antiplatelet, tell your doctor all the medicines, vitamins, herbal products, and supplements you take. · Tell your doctors, dentist, and pharmacist that you take an antiplatelet. · Take your medicine as your doctor directs. Make sure that you understand exactly what your doctor wants you to do. If another doctor says to stop taking the medicine for any reason, talk to the doctor who prescribed it before you stop. · Take your medicine at the same time every day. · Do not chew or crush the coated or time-release forms of your medicine. · If you miss a dose, don't take an extra dose to make up for it. · Ask your doctor whether you can drink alcohol. And ask how much you can drink. When you take an antiplatelet, drinking too much raises your risk for liver damage and stomach bleeding. · If you are pregnant, are breastfeeding, or plan to become pregnant, talk to your doctor about what medicines are safe. · Talk with your doctor before you take a pain medicine. Many pain medicines have aspirin. Too much aspirin can be harmful. · Wear medical alert jewelry. This lets others know that you take an antiplatelet. You can buy it at most drugstores. · Try to avoid injuries that might make you bleed. For example, be careful when you exercise and when you play sports. Make your home safe to reduce your risk of falling. When should you call for help? Call 911 anytime you think you may need emergency care.  For example, call if:    · You have a sudden, severe headache that is different from past headaches. Call your doctor now or seek immediate medical care if:    · You have any abnormal bleeding, such as:  ? A nosebleed that you can't easily stop. ? Bloody or black stools, or rectal bleeding. ? Bloody or pink urine.     · You feel dizzy or lightheaded or feel like you may faint. Watch closely for changes in your health, and be sure to contact your doctor if you have any problems. Where can you learn more? Go to http://www.gray.com/  Enter D853 in the search box to learn more about \"Taking Aspirin and Other Antiplatelets Safely: Care Instructions. \"  Current as of: August 31, 2020               Content Version: 12.8  © 2006-2021 [employer]. Care instructions adapted under license by [employer] (which disclaims liability or warranty for this information). If you have questions about a medical condition or this instruction, always ask your healthcare professional. [place] [address] any warranty or liability for your use of this information. Patient Education        Stroke: Care Instructions  Your Care Instructions     You have had a stroke. This means that the blood flow to a part of your brain was blocked for some time, which damages the nerve cells in that part of the brain. The part of your body controlled by that part of your brain may not function properly now. The brain is an amazing organ that can heal itself to some degree. The stroke you had damaged part of your brain. But other parts of your brain may take over in some way for the damaged areas. You have already started this process. Your doctor will talk with you about what you can do to prevent another stroke. High blood pressure, high cholesterol, and diabetes are all risk factors for stroke. If you have any of these conditions, work with your doctor to make sure they are under control.  Other risk factors for stroke include being overweight, smoking, and not getting regular exercise. Going home may be hard for you and your family. The more you can try to do for yourself, the better. Remember to take each day one at a time. Follow-up care is a key part of your treatment and safety. Be sure to make and go to all appointments, and call your doctor if you are having problems. It's also a good idea to know your test results and keep a list of the medicines you take. How can you care for yourself at home?    · Enter a stroke rehabilitation (rehab) program, if your doctor recommends it. Physical, speech, and occupational therapies can help you manage bathing, dressing, eating, and other basics of daily living.     · Do not drive until your doctor says it is okay.     · It is normal to feel sad or depressed after a stroke. If these feelings last, talk to your doctor.     · If you are having problems with urine leakage, go to the bathroom at regular times, including when you first wake up and at bedtime. Also, limit fluids after dinner.     · If you are constipated, drink plenty of fluids. If you have kidney, heart, or liver disease and have to limit fluids, talk with your doctor before you increase the amount of fluids you drink. Set up a regular time for using the toilet. If you continue to have constipation, your doctor may suggest using a bulking agent, such as Metamucil, or a stool softener, laxative, or enema. Medicines    · Take your medicines exactly as prescribed. Call your doctor if you think you are having a problem with your medicine. You may be taking several medicines. ACE (angiotensin-converting enzyme) inhibitors, angiotensin II receptor blockers (ARBs), beta-blockers, diuretics (water pills), and calcium channel blockers control your blood pressure. Statins help lower cholesterol.  Your doctor may also prescribe medicines for depression, pain, sleep problems, anxiety, or agitation.     · If your doctor has given you a blood thinner to prevent another stroke, be sure you get instructions about how to take your medicine safely. Blood thinners can cause serious bleeding problems.     · Do not take any over-the-counter medicines or herbal products without talking to your doctor first.     · If you take birth control pills or hormone therapy, talk to your doctor about whether they are right for you. For family members and caregivers    · Make the home safe. Set up a room so that your loved one does not have to climb stairs. Be sure the bathroom is on the same floor. Move throw rugs and furniture that could cause falls. Make sure that the lighting is good. Put grab bars and seats in tubs and showers.     · Find out what your loved one can do and what they need help with. Try not to do things for your loved one that your loved one can do on their own. Help them learn and practice new skills.     · Visit and talk with your loved one often. Try doing activities together that you both enjoy, such as playing cards or board games. Keep in touch with your loved one's friends as much as you can. Encourage them to visit.     · Take care of yourself. Do not try to do everything yourself. Ask other family members to help. Eat well, get enough rest, and take time to do things that you enjoy. Keep up with your own doctor visits, and make sure to take your medicines regularly. Get out of the house as much as you can. Join a local support group. Find out if you qualify for home health care visits to help with rehab or for adult day care. When should you call for help? Call 911 anytime you think you may need emergency care. For example, call if:    · You have signs of another stroke. These may include:  ? Sudden numbness, tingling, weakness, or loss of movement in your face, arm, or leg, especially on only one side of your body. ? Sudden vision changes. ? Sudden trouble speaking.   ? Sudden confusion or trouble understanding simple statements. ? Sudden problems with walking or balance. ? A sudden, severe headache that is different from past headaches. Call 911 even if these symptoms go away in a few minutes. Call your doctor now or seek immediate medical care if:    · You have new symptoms that may be related to your stroke, such as falls or trouble swallowing. Watch closely for changes in your health, and be sure to contact your doctor if you have any problems. Where can you learn more? Go to http://www.gray.com/  Enter C294 in the search box to learn more about \"Stroke: Care Instructions. \"  Current as of: March 4, 2020               Content Version: 12.8  © 0182-4545 [employer]. Care instructions adapted under license by [employer] (which disclaims liability or warranty for this information). If you have questions about a medical condition or this instruction, always ask your healthcare professional. [place] [address] any warranty or liability for your use of this information.

## 2021-08-30 NOTE — PROGRESS NOTES
NEURO PROGRESS NOTE        SUBJECTIVE:   Headache  Other medical problems  Morbid obesity      EXAM:  Awake, alert, oriented to day, month, year, not aphasic  Holds an ordinary conversation  Headaches essentially resolved      ASSESSMENT/PLAN:  For discharge from a neurological standpoint  Patient will have PT as an outpatient  Patient to follow-up with me in 2 weeks post discharge by calling [phone].     ALLERGIES:    No Known Allergies    MEDS:      Current Facility-Administered Medications:     butalbital-acetaminophen-caffeine (FIORICET, ESGIC) -40 mg per tablet 1 Tablet, 1 Tablet, Oral, Q6H PRN, Arminda Garcia MD, 1 Tablet at 08/29/21 2156    topiramate (TOPAMAX) sprinkle capsule 25 mg, 25 mg, Oral, Q12H, Tanwi IV, Della Gaines MD, 25 mg at 08/29/21 2247    acetaminophen (TYLENOL) tablet 650 mg, 650 mg, Oral, Q4H PRN, Arminda Garcia MD, 650 mg at 08/30/21 0924    albuterol (PROVENTIL HFA, VENTOLIN HFA, PROAIR HFA) inhaler 2 Puff, 2 Puff, Inhalation, Q4H PRN, Dimitri Garcia MD    fluticasone propionate (FLONASE) 50 mcg/actuation nasal spray 2 Spray, 2 Spray, Both Nostrils, DAILY, Arminda Garcia MD, 2 [place] at 08/30/21 0900    0.9% sodium chloride infusion, 75 mL/hr, IntraVENous, CONTINUOUS, Arminda Garcia MD, Last Rate: 75 mL/hr at 08/28/21 0724, 75 mL/hr at 08/28/21 0724    polyethylene glycol (MIRALAX) packet 17 g, 17 g, Oral, DAILY PRN, Dimitri Garcia MD    ondansetron (ZOFRAN ODT) tablet 4 mg, 4 mg, Oral, Q8H PRN **OR** ondansetron (ZOFRAN) injection 4 mg, 4 mg, IntraVENous, Q6H PRN, Arminda Garcia MD    enoxaparin (LOVENOX) injection 40 mg, 40 mg, SubCUTAneous, DAILY, Arminda Garcia MD, 40 mg at 08/30/21 1147    aspirin tablet 325 mg, 325 mg, Oral, DAILY, Arminda Garcia MD, 325 mg at 08/30/21 8212    atorvastatin (LIPITOR) tablet 40 mg, 40 mg, Oral, QHS, Arminda Garcia MD, 40 mg at 08/29/21 2156    LABS:  No results found for this or any previous visit (from the past 24 hour(s)). Visit Vitals  BP 97/61   Pulse 73   Temp 98.4 °F (36.9 °C)   Resp 16   Ht 5' 9\" (1.753 m)   Wt 131.5 kg (290 lb)   SpO2 99%   BMI 42.83 kg/m²       Imaging:  DUPLEX CAROTID BILATERAL   Final Result      CTA CODE NEURO HEAD AND NECK W CONT   Final Result   No occlusion or high-grade stenosis of the major cervical or intracranial   arterial vasculature. Please note that degrees of carotid stenosis are measured in accordance with   NASCET criteria. Report sent to the referring ER at the time of dictation. CT CODE NEURO HEAD WO CONTRAST   Final Result   1. No acute large vessel intracranial ischemia, hemorrhage. Called report to Dr. Elenore Bloch at 12:07 PM 8/26/2021.

## 2021-08-31 ENCOUNTER — HOSPITAL ENCOUNTER (EMERGENCY)
Age: 31
Discharge: HOME | End: 2021-08-31
Attending: EMERGENCY MEDICINE
Payer: COMMERCIAL

## 2021-08-31 VITALS
TEMPERATURE: 98.1 F | SYSTOLIC BLOOD PRESSURE: 127 MMHG | OXYGEN SATURATION: 100 % | HEART RATE: 80 BPM | RESPIRATION RATE: 16 BRPM | HEIGHT: 70 IN | WEIGHT: 285 LBS | DIASTOLIC BLOOD PRESSURE: 81 MMHG | BODY MASS INDEX: 40.8 KG/M2

## 2021-08-31 DIAGNOSIS — Z20.822 SUSPECTED COVID-19 VIRUS INFECTION: Primary | ICD-10-CM

## 2021-08-31 LAB — SARS-COV-2, COV2: NORMAL

## 2021-08-31 PROCEDURE — U0003 INFECTIOUS AGENT DETECTION BY NUCLEIC ACID (DNA OR RNA); SEVERE ACUTE RESPIRATORY SYNDROME CORONAVIRUS 2 (SARS-COV-2) (CORONAVIRUS DISEASE [COVID-19]), AMPLIFIED PROBE TECHNIQUE, MAKING USE OF HIGH THROUGHPUT TECHNOLOGIES AS DESCRIBED BY CMS-2020-01-R: HCPCS

## 2021-08-31 PROCEDURE — 99282 EMERGENCY DEPT VISIT SF MDM: CPT

## 2021-08-31 RX ORDER — DEXTROMETHORPHAN HYDROBROMIDE, GUAIFENESIN 20; 400 MG/20ML; MG/20ML
5 SOLUTION ORAL EVERY 6 HOURS
Qty: 200 ML | Refills: 0 | Status: SHIPPED | OUTPATIENT
Start: 2021-08-31

## 2021-08-31 RX ORDER — ALBUTEROL SULFATE 90 UG/1
2 AEROSOL, METERED RESPIRATORY (INHALATION)
Qty: 90 G | Refills: 0 | Status: SHIPPED | OUTPATIENT
Start: 2021-08-31

## 2021-08-31 RX ORDER — AZITHROMYCIN 250 MG/1
TABLET, FILM COATED ORAL
Qty: 6 TABLET | Refills: 0 | Status: SHIPPED | OUTPATIENT
Start: 2021-08-31

## 2021-08-31 RX ORDER — ONDANSETRON 4 MG/1
4 TABLET, ORALLY DISINTEGRATING ORAL
Qty: 20 TABLET | Refills: 0 | OUTPATIENT
Start: 2021-08-31 | End: 2021-12-18

## 2021-08-31 RX ORDER — DIPHENOXYLATE HYDROCHLORIDE AND ATROPINE SULFATE 2.5; .025 MG/1; MG/1
1 TABLET ORAL
Qty: 20 TABLET | Refills: 0 | Status: SHIPPED | OUTPATIENT
Start: 2021-08-31

## 2021-08-31 NOTE — ED PROVIDER NOTES
EMERGENCY DEPARTMENT HISTORY AND PHYSICAL EXAM      Date: 8/31/2021  Patient Name: Ani Cisneros    History of Presenting Illness     Chief Complaint   Patient presents with    Covid Testing       History Provided By: Patient    HPI: Ani Cisneros, 27 y.o. female with a past medical history significant No significant past medical history presents to the ED with chief complaint of Covid Testing  . 71-year-old female recently discharged from the hospital for a new stroke. No new deficits. While she was in the hospital and since then she has lost her taste of smell. Has a sore throat. Cough. Ear pressure. Unsure about any fevers. No nausea vomiting diarrhea. There are no other complaints, changes, or physical findings at this time. PCP: Zari El NP    Current Outpatient Medications   Medication Sig Dispense Refill    azithromycin (Zithromax Z-Stephan) 250 mg tablet Take 2 tablet p.o. day 1 then 1 tablet p.o. day 2 through 5 6 Tablet 0    albuterol (PROVENTIL HFA, VENTOLIN HFA, PROAIR HFA) 90 mcg/actuation inhaler Take 2 Puffs by inhalation every four (4) hours as needed for Wheezing. 90 g 0    dextromethorphan-guaiFENesin (Robitussin Cough-Chest Uriel DM) 5-100 mg/5 mL liqd Take 5 mL by mouth every six (6) hours. 200 mL 0    diphenoxylate-atropine (LomotiL) 2.5-0.025 mg per tablet Take 1 Tablet by mouth four (4) times daily as needed for Diarrhea (1 tab after each stool for max 8 per day). Max Daily Amount: 4 Tablets. Take after each stool for a maximum of 8 tablets daily 20 Tablet 0    ondansetron (Zofran ODT) 4 mg disintegrating tablet 1 Tablet by SubLINGual route every eight (8) hours as needed for Nausea or Vomiting. 20 Tablet 0    aspirin (ASPIRIN) 325 mg tablet Take 1 Tablet by mouth daily. 30 Tablet 0    atorvastatin (LIPITOR) 40 mg tablet Take 1 Tablet by mouth nightly.  30 Tablet 0    butalbital-acetaminophen-caffeine (FIORICET, ESGIC) -40 mg per tablet Take 1 Tablet by mouth every six (6) hours as needed for Headache. 30 Tablet 0    topiramate (TOPAMAX) 25 mg sprinkle capsule Take 1 Capsule by mouth every twelve (12) hours. 30 Capsule 0    albuterol (PROVENTIL HFA, VENTOLIN HFA, PROAIR HFA) 90 mcg/actuation inhaler Take 2 Puffs by inhalation every four (4) hours as needed for Wheezing. 1 Inhaler 0    fluticasone propionate (FLONASE) 50 mcg/actuation nasal spray 2 Sprays by Both Nostrils route daily. (Patient not taking: Reported on 2021) 1 Bottle 0       Past History     Past Medical History:  Past Medical History:   Diagnosis Date    Asthma        Past Surgical History:  No past surgical history on file. Family History:  No family history on file. Social History:  Social History     Tobacco Use    Smoking status: Former Smoker     Quit date: 2020     Years since quittin.1    Smokeless tobacco: Never Used   Substance Use Topics    Alcohol use: Never    Drug use: Yes     Types: Marijuana       Allergies:  No Known Allergies      Review of Systems   Review of Systems   Constitutional: Positive for chills and fatigue. Negative for diaphoresis. HENT: Positive for congestion and sore throat. Negative for ear pain and nosebleeds. Eyes: Negative. Negative for pain, discharge and redness. Respiratory: Positive for cough and shortness of breath. Negative for chest tightness and wheezing. Cardiovascular: Negative. Negative for chest pain, palpitations and leg swelling. Gastrointestinal: Negative. Negative for abdominal pain, constipation, diarrhea, nausea and vomiting. Endocrine: Negative. Negative for cold intolerance. Genitourinary: Negative. Negative for difficulty urinating, dysuria and hematuria. Musculoskeletal: Negative. Negative for arthralgias, joint swelling and neck pain. Skin: Negative. Negative for color change, pallor, rash and wound. Allergic/Immunologic: Negative. Neurological: Negative.   Negative for dizziness, syncope, weakness, light-headedness and headaches. Hematological: Negative. Does not bruise/bleed easily. Psychiatric/Behavioral: Negative. Negative for behavioral problems, confusion and suicidal ideas. All other systems reviewed and are negative. Physical Exam   Physical Exam  Vitals and nursing note reviewed. Exam conducted with a chaperone present. Constitutional:       Appearance: Normal appearance. She is normal weight. HENT:      Head: Normocephalic and atraumatic. Right Ear: Tympanic membrane and ear canal normal.      Left Ear: Tympanic membrane and ear canal normal.      Nose: Congestion present. Mouth/Throat:      Mouth: Mucous membranes are moist.      Pharynx: Oropharynx is clear. No oropharyngeal exudate or posterior oropharyngeal erythema. Eyes:      Extraocular Movements: Extraocular movements intact. Conjunctiva/sclera: Conjunctivae normal.      Pupils: Pupils are equal, round, and reactive to light. Cardiovascular:      Rate and Rhythm: Normal rate and regular rhythm. Pulses: Normal pulses. Heart sounds: Normal heart sounds. Pulmonary:      Effort: Pulmonary effort is normal. No respiratory distress. Breath sounds: Normal breath sounds. Abdominal:      General: Abdomen is flat. Bowel sounds are normal. There is no distension. Palpations: Abdomen is soft. Tenderness: There is no abdominal tenderness. There is no guarding. Musculoskeletal:         General: No swelling, tenderness, deformity or signs of injury. Normal range of motion. Cervical back: Normal range of motion and neck supple. Right lower leg: No edema. Left lower leg: No edema. Skin:     General: Skin is warm and dry. Capillary Refill: Capillary refill takes less than 2 seconds. Findings: No lesion or rash. Neurological:      General: No focal deficit present. Mental Status: She is alert and oriented to person, place, and time. Mental status is at baseline. Cranial Nerves: No cranial nerve deficit. Psychiatric:         Mood and Affect: Mood normal.         Behavior: Behavior normal.         Thought Content: Thought content normal.         Judgment: Judgment normal.         Diagnostic Study Results     Labs -   No results found for this or any previous visit (from the past 12 hour(s)). Radiologic Studies -   No orders to display     CT Results  (Last 48 hours)    None        CXR Results  (Last 48 hours)    None          Medical Decision Making and ED Course   I am the first provider for this patient. I reviewed the vital signs, available nursing notes, past medical history, past surgical history, family history and social history. Vital Signs-Reviewed the patient's vital signs. No data found. EKG interpretation:         Records Reviewed: Previous Hospital chart. EMS run report      ED Course:   Initial assessment performed. The patients presenting problems have been discussed, and they are in agreement with the care plan formulated and outlined with them. I have encouraged them to ask questions as they arise throughout their visit. Orders Placed This Encounter    SARS-COV-2     Standing Status:   Standing     Number of Occurrences:   1     Order Specific Question:   Specimen source     Answer:   Nasal [[id number]]     Order Specific Question:   Is this test for diagnosis or screening? Answer:   Diagnosis of ill patient     Order Specific Question:   Symptomatic for COVID-19 as defined by CDC? Answer:   Yes     Order Specific Question:   Date of Symptom Onset     Answer:   8/31/2021     Order Specific Question:   Hospitalized for COVID-19? Answer:   No     Order Specific Question:   Admitted to ICU for COVID-19? Answer:   No     Order Specific Question:   Employed in healthcare setting? Answer:   No     Order Specific Question:   Resident in a congregate (group) care setting?      Answer:   No     Order Specific Question:   Pregnant? Answer:   No     Order Specific Question:   Previously tested for COVID-19? Answer: Yes    NOVEL CORONAVIRUS (COVID-19)     Standing Status:   Standing     Number of Occurrences:   1    azithromycin (Zithromax Z-Stephan) 250 mg tablet     Sig: Take 2 tablet p.o. day 1 then 1 tablet p.o. day 2 through 5     Dispense:  6 Tablet     Refill:  0    albuterol (PROVENTIL HFA, VENTOLIN HFA, PROAIR HFA) 90 mcg/actuation inhaler     Sig: Take 2 Puffs by inhalation every four (4) hours as needed for Wheezing. Dispense:  90 g     Refill:  0    dextromethorphan-guaiFENesin (Robitussin Cough-Chest Uriel DM) 5-100 mg/5 mL liqd     Sig: Take 5 mL by mouth every six (6) hours. Dispense:  200 mL     Refill:  0    diphenoxylate-atropine (LomotiL) 2.5-0.025 mg per tablet     Sig: Take 1 Tablet by mouth four (4) times daily as needed for Diarrhea (1 tab after each stool for max 8 per day). Max Daily Amount: 4 Tablets. Take after each stool for a maximum of 8 tablets daily     Dispense:  20 Tablet     Refill:  0    ondansetron (Zofran ODT) 4 mg disintegrating tablet     Si Tablet by SubLINGual route every eight (8) hours as needed for Nausea or Vomiting. Dispense:  20 Tablet     Refill:  0              CONSULTANTS:  Consults      Provider Notes (Medical Decision Making):   76-year-old female with recent hospitalization for stroke now with URI symptoms loss of taste and smell wants to be checked for Covid. Covid swab sent. Patient's vitals are stable.   Not hypoxic or tachypneic.    -------------------------------------------------------------------------------------  COVID-19 Risk of decompensation within 24 hours:    Clinical risk score:   CHF,COPD, age >57 (+2 points each)   0  CXR (moderate nonlobar +1, 1 lobe +2, bilat severe +2) 0  HR > 100 at disposition (+2 points)    0  O2 sats <92% RA (+4 points)     0  RR >20 (+2 points)      0    TOTAL SCORE 0  - SCORE 0-2: Discharged home with routine follow-up  - SCORE 3-4: Discharged home with pulse oximeter or 24-hour follow-up  - SCORE 0-4: Consider aspirin 81 mg p.o. daily for 2 weeks if not contraindicated or allergy  - SCORE 5-8: Consider chest x-ray CBC CMP troponin and lactate. If troponin and lactate are normal go to low risk. If troponin or D-dimer are lactate are elevated go to high risk  - SCORE 9+: Consider admission. Decadron 6 mg IV. VTE prophylaxis, antibiotics for pneumonia. Check vitamin D levels. Limit fluids and choose pressors early. Proning. High flow nasal cannula. [Includes respiratory distress. Unstable, oxygen need for sats greater than 90% or acute delirium.]          Procedures                       Disposition       Emergency Department Disposition:  Discharged      Diagnosis     Clinical Impression:   1. Suspected COVID-19 virus infection        Attestations:    Annemarie Gan MD    Please note that this dictation was completed with [employer], the computer voice recognition software. Quite often unanticipated grammatical, syntax, homophones, and other interpretive errors are inadvertently transcribed by the computer software. Please disregard these errors. Please excuse any errors that have escaped final proofreading. Thank you.

## 2021-08-31 NOTE — ED TRIAGE NOTES
Pt stated she was discharged yesterday from hospital for stroke. Pt stated she lost her since of smell. Wanted to get tested for covid. Sore throat and cough as well.

## 2021-08-31 NOTE — PROGRESS NOTES
[address] August 31, 2021       RE: Kamar Miller      To Whom It May Concern,    This is to certify that Kamar Miller may return to work on 09-01-21. Patient requires physical therapy to strengthen left side related to recent CVA. Patient was unable to go to inpatient rehabilitation facility. As a result, patient was recommended to follow up with primary care physician to seek outpatient therapy. Patient was admitted on 08-26-21 until 08-30-21 at [address] [place] [address]). Please feel free to contact my office at [phone] or nursing station at [phone],  if you have any questions or concerns. Thank you for your assistance in this matter.       Sincerely,  Carlos Earl MD

## 2021-09-01 ENCOUNTER — PATIENT OUTREACH (OUTPATIENT)
Dept: CASE MANAGEMENT | Age: 31
End: 2021-09-01

## 2021-09-01 NOTE — PROGRESS NOTES
Patient contacted regarding COVID-19 risk. Discussed COVID-19 related testing which was pending at this time. Test results were pending. Patient informed of results, if available? yes. Ambulatory Care Manager contacted the patient by telephone to perform post discharge assessment. Call within 2 business days of discharge: Yes Verified name and  with patient as identifiers. Provided introduction to self, and explanation of the CTN/ACM role, and reason for call due to risk factors for infection and/or exposure to COVID-19. Symptoms reviewed with patient who verbalized the following symptoms: fatigue, shortness of breath, loss or taste or smell and sore throat, cough, chills      Due to no new or worsening symptoms encounter was not routed to provider for escalation. Discussed follow-up appointments. If no appointment was previously scheduled, appointment scheduling offered:  No. Patient will call PCP for follow up. [facility] follow up appointment(s): No future appointments. Non-[facility] follow up appointment(s): none reported    Interventions to address risk factors: Education of patient/family/caregiver/guardian to support self-management-covid 19. Advance Care Planning:   Does patient have an Advance Directive: not on file; education provided. Carmita Jerod, mother is listed as medical agent. Educated patient about risk for severe COVID-19 due to risk factors according to CDC guidelines. ACM reviewed discharge instructions, medical action plan and red flag symptoms with the patient who verbalized understanding. Discussed COVID vaccination status: yes. Education provided on COVID-19 vaccination as appropriate. Discussed exposure protocols and quarantine with CDC Guidelines. Patient was given an opportunity to verbalize any questions and concerns and agrees to contact ACM or health care provider for questions related to their healthcare.     Reviewed and educated patient on any new and changed medications related to discharge diagnosis -Patient reports she obtained medications today and does not have questions at this time. Was patient discharged with a pulse oximeter? no     ACM provided contact information. Plan for follow-up call in 5-7 days based on severity of symptoms and risk factors.  KLEVER

## 2021-09-03 LAB — SARS-COV-2, COV2NT: DETECTED

## 2021-09-03 NOTE — PROCEDURES
[facility]  EEG    Name:  Gavin Morel  MR#:  [id number]  :  1990  ACCOUNT #:  [de-identified]  DATE OF SERVICE:  2021    DIAGNOSIS:  Headache. DESCRIPTION:  Recording is done digitally on computer. Electrodes are placed in a 10-20 international system. Initial recording is equipment calibration followed by biocalibration. Initial montages are bipolar montages. TECHNIQUE:  Tracing started with the patient awake, eyes closed, with well-formed bioccipital alpha rhythms in the 10 Hz frequency. As the recording continues, the patient is hooked up to an EKG machine that shows a heart rate of 66. Tracings continue with the patient being exposed to photic stimulation. No hyperventilation. IMPRESSION:  This is a normal EEG, awake.       Makayla Geiger MD      [email or web address]  D:  2021 12:42  T:  2021 16:34  JOB #:  [id number]

## 2021-09-07 NOTE — PROGRESS NOTES
Attempted to reach patient regarding COVID-19 test results. Patient has a voicemail box that has not been set up yet and there was no answer.

## 2021-09-16 ENCOUNTER — PATIENT OUTREACH (OUTPATIENT)
Dept: CASE MANAGEMENT | Age: 31
End: 2021-09-16

## 2021-09-16 NOTE — PROGRESS NOTES
Patient resolved from [address] Transitions episode on 9/16/21. Discussed COVID-19 related testing which was available at this time. Test results were positive. Patient informed of results, if available? yes     Patient/family has been provided the following resources and education related to COVID-19:                         Signs, symptoms and red flags related to COVID-19            [facility] exposure and quarantine guidelines            [employer] exposure contact - [phone]            Contact for their local Department of Health                 Patient currently reports that the following symptoms have improved:  fatigue, shortness of breath, loss or taste or smell and sore throat, chills, cough. No further outreach scheduled with this [facility]  Episode of Care resolved. Patient has this [facility] contact information if future needs arise.  KLEVER

## 2021-09-22 ENCOUNTER — HOSPITAL ENCOUNTER (OUTPATIENT)
Dept: PHYSICAL THERAPY | Age: 31
Discharge: HOME | End: 2021-09-22
Payer: COMMERCIAL

## 2021-09-22 ENCOUNTER — TRANSCRIBE ORDER (OUTPATIENT)
Dept: SCHEDULING | Age: 31
End: 2021-09-22

## 2021-09-22 DIAGNOSIS — I63.9 STROKE (HCC): Primary | ICD-10-CM

## 2021-09-22 PROCEDURE — 97167 OT EVAL HIGH COMPLEX 60 MIN: CPT

## 2021-09-22 NOTE — PROGRESS NOTES
[address]  Ph: [phone]    Fax: [phone]    Initial Evaluation/Plan of Care/Statement of Necessity for Occupational Therapy Services     Patient name: Bandar Romero   : 1990  [x]  Patient  Verified Provider#: [id number]    Start of Care: 21        Referral source: Nava Richards NP Return visit to MD:  21     Medical/Treatment Diagnosis: Muscle weakness (generalized) [M62.81]  Unspecified sequelae of cerebral infarction [I69.30]    Payor: [address] [place] [address] Road / Plan: [place] / Product Type: Managed Care Medicaid /       Prior Hospitalization: see medical history     Comorbidities: asthma  Prior Level of Function: independent  Medications: Verified on Patient Summary List          Patient / Family readiness to learn indicated by: asking questions and interest    Persons(s) to be included in education: patient (P)    Barriers to Learning/Limitations: None    Patient Self Reported Health Status: fair    Rehabilitation Potential: good    Previous Treatment/Compliance: no therapy since discharge from the hospital    PMHx/Surgical Hx: Migraines, asthma, Covid +    Work Hx: CNA    Living Situation: lives with mother and son (15); one story home; 1 entry step    Barriers to progress: pain    Motivation: very good    Substance use: tobacco    Cognition: A & O x 4     Onset Date: 21      In time:1000   Out time: 1058 Total Treatment Time (min): 58     Visit #: 1     SUBJECTIVE  Mechanism of Injury:    Patient  States she had  migraine-like headaches X 2 weeks prior to admission to the hospital.  Patient states her left arm and leg were a little numb and she had a very bad headache. Her  BP was elevated. Patient drove herself to the hospital. She was admitted on 21 secondary to a stroke. CT of head negative.  Patient unable to have an MRI of the head due to machine size.  Patient states she is to have an MRI of the head in an \"Open air\" one --waiting for appointment time to be scheduled. Patient seen by neurology (Dr. Anne Hodge) on 9/21/21. She had a telehealth  Visit with her PCP on 9/7/21 with an in person visit scheduled for  9/28/21. Patient states she was to go to rehab but declined as she has a 15 y/o son that could not be by himself (mother works outside the home). Patient now being referred to outpatient occupational and physical therapy services to address limitations with daily activities,  moving the LUE, LUE. Patient tearful initially while recalling information about the stroke. Patient used clinic wheelchair from waiting room to OT tx area, tx area to car due to left side pain with max difficulty tolerating weight bearing on the LLE. Patient noted to have some difficulties with speaking--word finding , slow patricia. Area of pain:  Headaches; left arm, left hip/back  Pain Level (0-10 scale)  Worst: 10/10   Least: 5/10  Things that worsen pain: sleeping on the left side; bending; standing/sitting  For too long   Things that ease pain: medication; repositioning  Pain Level (0-10 scale) pre treatment:  Pain Level (0-10 scale) post treatment: 10/10      OBJECTIVE    Objective/Functional Measures:     ADL/IADLs:    Feeding: Mod I    UB Dressing:   SBA to min A(assist with pulling down in back)  LB  Dressing:  Min A--assist needed with pants and socks             Toileting: Mod I  Bathing: Mod I to Min A (feet)  Grooming: Mod I  Light Meal Prep: Mod I, lightweight cookware used; short periods of time  Driving:  Very limited  Sleep: painful to sleep on the L side      DME/AE: n/a  Orthotics:n/a    Mobility:Mod I to SBA  Transfers:mod I to SBA  Falls;   None reported    VISION:    --wears glasses, bifocal  --patient denies any changes to her vision since the stroke      Upper Extremity Assessment:    Hand Dominance: right    Opposition: Right:intact   Left: to IF, MF, RF; unable to oppose to [facility]     Measurements: Taken with Frandy Dynamometer, in lbs   Level 2 DATE  9/22 DATE DATE DATE DATE   Right 78       Left 0         Pinch Measurements: Taken with Pinch Gauge, in lbs    Date  9/22 Date Date   3 pt      Right 15.5     Left  n/t     Lateral      Right 17.5     Left 0     Tip      Right 9     Left n/t       Fine Motor:     Box and Block; Left hand:  4   blocks        (seated)      SENSATION  Light Touch []WFL          [x] Impaired LUE           Muscle Tone: normal      ROM:        Active Active/Passive   DATE:    9/22       Norms Right Left Right Left   Shoulder Flex 0-180 WFL poor      Ext 0-60 WFL n/t      abd 0-180 WFL trace      IR 0-70 WFL Trace       ER 0-90 WFL n/t     Elbow Flex 0-150 WFL 90      Ext 0 WFL 0     Forearm Supination 0-80 WFL To  neutral      Pronation 0-80 WFL WFL/slow     Wrist Flex 0-80        Ext 0-70  10      Ulnar Dev 0-30  Trace       Radial Dev 0-20  trace     Finger ABD/ADD   trace      flexion  WFL 1/2 - 3/4         EDEMA Measurements: In mm Right Left  9/22 Right Left Right Left Right Left   DPC 19.0 19.3         Wrist Crease 17.8 17.3               ASSESSMENT/Changes in Function:   Patient is a 26 y/o right hand dominant female referred to occupational therapy services following a CVA with L side weakness. Patient presents with limitations in self care, IADLs, work, and mobility tasks. Patient has not received therapy since acute care services. Feel patient will benefit from skilled occupational therapy services to address limitations noted below in order to promote increased independence and function with all daily activities, reduce burden of care, improve quality of life. Feel patient may also benefit from speech therapy services due to word finding difficulties noted during evaluation.     Am-Pac:72.84%    Problem List/Impairments: Pain effecting function, Decreased range of motion, Decreased strength, Edema effecting function, Decreased coordination/prehension, Decreased ADL/functional abilities , Decreased activity tolerance, Decreased flexibility/joint mobility, Decreased transfer abilities and Sensability     Frequency / Duration: Patient to be seen 2 times per week for 24 treatments. Certification Period: 9/22/21 - 11/20/21    Treatment Plan may include any combination of the following: Therapeutic exercise, Therapeutic activities, Neuromuscular re-education, Physical agent/modality, Manual therapy, Splinting/orthoses, Patient education and ADLs/IADLs    Patient/ Caregiver education and instruction: other OT plan of care     Patient Goal (s): to some of my strength back\" \"to decrease the pain\"    Short Term Goals: To be accomplished in 8 treatments:   1. Patient will be mod I with home exercise program to promote gains between sessions    [] Met [] Not met [] Partially met    2. Patient will demonstrate a measureable L hand  strength in order to use the hand as an assist during daily tasks. [] Met [] Not met [] Partially met    3. Patient will independently recall and/or demonstrate 2 or more compensatory techniques she can use due to impaired LUE sensation    [] Met [] Not met [] Partially met    4. Patient will be able to hold a utensil in the left hand in order to cut  Food during meals. [] Met [] Not met [] Partially met      Long Term Goals: To be accomplished in 24 treatments:   1. Patient will be mod I with all dressing tasks    [] Met [] Not met [] Partially met    2. Patient will be mod I with bathing    [] Met [] Not met [] Partially met    3. Patient will be able to use the left arm as a gross assist with ADL/IADL tasks    [] Met [] Not met [] Partially met    4. Patient will demonstrate a functional L hand  and pinch strength in order to use the hand for ADLs    [] Met [] Not met [] Partially met    5.   Patient will be mod I with performing light/medium IADLs    [] Met [] Not met [] Partially met       [x]  Plan of care will be  reviewed with PAIZ. The Plan of Care is based on information from the initial evaluation. LM Dhaliwal/L 9/22/2021   ________________________________________________________________________    I certify that the above Therapy Services are being furnished while the patient is under my care. I agree with the treatment plan and certify that this therapy is necessary.     [de-identified] Signature:____________________  Date:____________Time: _________

## 2021-09-27 ENCOUNTER — HOSPITAL ENCOUNTER (OUTPATIENT)
Dept: PHYSICAL THERAPY | Age: 31
Discharge: HOME | End: 2021-09-27
Payer: COMMERCIAL

## 2021-09-27 PROCEDURE — 97163 PT EVAL HIGH COMPLEX 45 MIN: CPT

## 2021-09-27 NOTE — PROGRESS NOTES
[address]  Ph: [phone]    Fax: [phone]    Initial Evaluation/Plan of Care/Statement of Necessity for Physical Therapy Services     Patient name: Marina Dunn    Date/Start of Care:2021  : 1990  [x]  Patient  Verified   Provider#: [id number]          Referral source: Christine Cristobal NP    Return visit to MD: 21     Medical/Treatment Diagnosis: Muscle weakness (generalized) [M62.81]  Unspecified sequelae of cerebral infarction [I69.30]    Payor: [facility] [address] / Plan: [address] / Product Type: Managed Care Medicaid /       Prior Hospitalization: see medical history     Comorbidities: pain in neck, back, hip, and L extremities; old fractures, smoking and HBP  Prior Level of Function: independent and working  Medications: Verified on Patient Summary List          Patient / Family readiness to learn indicated by: asking questions, trying to perform skills and interest  Persons(s) to be included in education: patient (P)  Barriers to Learning/Limitations: having some issues with STM loss  Patient Self Reported Health Status: fair  Rehabilitation Potential: good  Previous Treatment/Compliance: none  PMHx/Surgical Hx: see intake  Work Hx: 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     Barriers to progress: recent stroke, mild memory loss, pain, poor understanding of stroke rehab process   Motivation: excellent  Substance use: smokes cigarettes and is trying to quit, no alcohol  Cognition: A & O x 4   Onset Date: 21    SUBJECTIVE  Mechanism of Injury: CVA with L sided weakness;   Area of pain: L neck, UE, LE, hip, and back; most of her pain is along L thigh and hip but stops at knee; the arm pain will go all the way down the L arm and also feels weak   Pain Descriptors:  \"it feels broken like a fracture\"   Pain Level (0-10 scale)  At rest: 6/10 With activity: 9/10   Worst: 9/10   Least: 4-5/10  Things that worsen pain: doing too much   Things that ease pain: tries Tylenol but it does not help, eases if she lies flat on her back  Objective/Functional Measures including ROM/MMT:    BP: she takes it at home and it is usually around \"110/70\"; Now it is 119/99 in the R arm   Hip      AROM       PROM             MMT   R L R L R L   Flexion (120)      4  2+   Extension (15)         Abduction (40)      3-   Adduction (30)      3-   IR (40)         ER (40)         Additional comments: pt started to have severe pain in L hip and back with MMT to LE on either side. Tried to focus efforts to L leg, but she kept having increased, sharp pain, so stopped. She uses her R hand to  the L leg to help get it into the car.     Knee          AROM          PROM                       MMT   R L R L R L   Extension (0)        2+   Flexion (145)      3+   Additional comments: able to hold against mild resistance for HS, but very painful   ANKLE                               AROM                     PROM                     MMT:   R L R L R L   Dorsiflexion (15)       3-   Plantarflexion (50)      3+   Inversion (35)          Eversion (25)         Sitting Balance: (unsupported)  Static:      [] Normal [x] Good [] Fair [] Poor  Dynamic: [] Normal [x] Good [] Fair [] Poor  Standing Balance:   Static:     [] Normal [x] Good [x] Fair [] Poor  Pt able to stand briefly due to L hip pain  Dynamic: [] Normal [] Good [x] Fair [] Poor    *Unable to assess further due to severe pain in L hip and back; difficulty tolerating pain just for MMT    Tandem Stand: (eyes open/eyes closed)    R foot forward:   L foot forward:   Single limb stand: R:   L:   Functional Reach   Feet Placement:     Score:    <7 inches indicates poor functional balance  COMMENTS (include gait belt, level of assistance):   Pt required min Asst to SB for WC to chair transfers, she needed min A x2 for car to [facility] transfer--she had to call to our office and ask us to come outside to get her from the car as she was unable to make it in. She had no wheelchair or assistive device and states she was not given anything to bring home from the hospital, but was using a jonathan-walker while there. We took a wheelchair to the car to bring her in and out today. Pt came alone as her family is working during the day and no one was there to bring her. She was wearing slippers that were unsafe for standing and walking, especially on tile floor or outside. Ampac Score:   69%  ASSESSMENT/Changes in Function:   Pt unable to tolerate full examination due to severe pain in the L hip area and radiating into her back. She reports no fall, but I am not sure if this is from the stroke or something else occurring. Attempted palpation of the area, but too painful, and I knew she still had to drive herself back home. Spent most of the time educating the patient on how a stroke affects the body and what she can expect her recovery to look like. She does not have realistic expectations for this point in her rehab (driving herself and relying on others to get her out of the car, wearing slippers). She needs consistent education and a home evaluation to address safe transfers, as she relies on her neighbors or 15year old son to get her in and out of the car. Needs evaluation for assistive devices.     Problem List/Impairments: pain affecting function, decrease strength, impaired gait/ balance, decrease ADL/ functional abilitiies, decrease activity tolerance and decrease transfer abilities  Treatment Plan may include any combination of the following: Therapeutic exercise, Therapeutic activities, Neuromuscular re-education, Physical agent/modality, Gait/balance training, Manual therapy, Patient education, Self Care training, Functional mobility training, Home safety training and Stair training  Patient/ Caregiver education and instruction: self care, activity modification and exercises  Frequency / Duration: Called and asked Dr. Savanah Zavala for home health evaluation and then progress her to outpatient when she either has transportation or is safe to drive herself and get into therapy. Certification Period: 9/27/21-9/27/21  Refer to home health, EVAL only  Patient Goal (s): walk by myself    [] Met [] Not met [] Partially met   Short Term Goals: Get patient home health PT and OT, for home safety evaluation and independence with transfers, negotiating her step into the home independently, appropriate assistive device recommendation,pain management, and stroke education. TODAY'S TREATMENT: EVALUATION completed   Visit #: 1/1  In time:12:50pm   Out time:2:05pm  Total Treatment Time (min): 60   Pain Level (0-10 scale) pre treatment: 8-9/10  Pain Level (0-10 scale) post treatment: 8-9/10    5 Min Therapeutic Exercise:  [x] See flow sheet :   Rationale: increase strength and improve coordination to improve the patients ability to ease pain and start gentle LE motion   With   [] TE   [] TA   [] Neuro   [] SC   [] other: Patient Education: [] Review HEP    [] Progressed/Changed HEP based on:   [] positioning   [] body mechanics   [] transfers   [] heat/ice application    [] other:        [x]  Plan of care has been reviewed with PTA. The Plan of Care is based on information from the initial evaluation. [facility] Batch Mehfoud, PT 9/27/2021   ________________________________________________________________________    I certify that the above Therapy Services are being furnished while the patient is under my care. I agree with the treatment plan and certify that this therapy is necessary.     [de-identified] Signature:_________________________________________________  Date:____________Time: ____________ Serenity Whiting, NP

## 2021-10-06 ENCOUNTER — HOSPITAL ENCOUNTER (OUTPATIENT)
Dept: MRI IMAGING | Age: 31
Discharge: HOME | End: 2021-10-06
Payer: COMMERCIAL

## 2021-10-06 DIAGNOSIS — I63.9 STROKE (HCC): ICD-10-CM

## 2021-10-06 PROCEDURE — 70551 MRI BRAIN STEM W/O DYE: CPT

## 2021-10-11 NOTE — PROGRESS NOTES
[address]  Ph: [phone]  Fax: [phone]    Medicaid Discharge Summary 215    Patient name: Shaheed Crews  : 1990  Provider#: [id number]  Referral source: Wes Velazquez NP      Medical/Treatment Diagnosis: Muscle weakness (generalized) [M62.81]  Unspecified sequelae of cerebral infarction [I69.30]     Prior Hospitalization: see medical history     Comorbidities: See Plan of Care  Prior Level of Function: See Plan of Care  Medications: Verified on Patient Summary List    Start of Care: 21   Onset Date:21   Visits from Start of Care: 1  Missed Visits: 0  Certification Period : 21 to     Assessment/Summary of care/GOALS:   Patient referred to Occupational Therapy(OT) services due to limitations in performing daily activities following a CVA on 21. Patient seen for evaluation on 21. Patient seen by physical therapy on 21. Following the physical therapy  evaluation, the recomendation was made to transfer therapy services to  home health services due to patient's significant pain that was reported as well as difficulty walking/transferring. Patient was not using any assistive device at  Time of evaluation. Patient was assisted out to her car via a wheelchair as well as provided help transferring in and out of the car. Patient stated she would be driving herself to therapy. Patient contacted on 10/11/21 and reports she is now receiving home health therapy services. Patient will thus be discharged from outpatient OT services at this time. Feel patient may benefit from returning to outpatient therapy in the future if/as indicated. Thank you for the referral.      --No goals met due to patient seen for evaluation only--     Patient Goal (s): to some of my strength back\" \"to decrease the pain\"--NOT met     Short Term Goals:  To be accomplished in 8 treatments: NOT met              1.  Patient will be mod I with home exercise program to promote gains between sessions                          []? Met []? Not met []? Partially met               2.  Patient will demonstrate a measureable L hand  strength in order to use the hand as an assist during daily tasks. []? Met []? Not met []? Partially met               3.  Patient will independently recall and/or demonstrate 2 or more compensatory techniques she can use due to impaired LUE sensation                          []? Met []? Not met []? Partially met               4.  Patient will be able to hold a utensil in the left hand in order to cut  Food during meals. []? Met []? Not met []? Partially met                 Long Term Goals: To be accomplished in 24 treatments:  NOT  Met              1.  Patient will be mod I with all dressing tasks                          []? Met []? Not met []? Partially met               2.  Patient will be mod I with bathing                          []? Met []? Not met []? Partially met               3.  Patient will be able to use the left arm as a gross assist with ADL/IADL tasks                          []? Met []? Not met []? Partially met               4.  Patient will demonstrate a functional L hand  and pinch strength in order to use the hand for ADLs                          []? Met []? Not met []? Partially met               5.  Patient will be mod I with performing light/medium IADLs                          []? Met []? Not met []? Partially met     RECOMMENDATIONS:  [x]Discontinue therapy: Patient being seen by home health [address], OTR/L 10/11/2021   Retain this original for your records.   If you are unable to process this request in 24 hours, please contact our office.   ________________________________________________________________________  NOTE TO PHYSICIAN:  Please complete the following and FAX to:  [address]:  Fax: [phone]   ____ I have read the above report and request that my patient be discharged from therapy.     Physician's Signature:_____________________________________________________ Date:_____________Time:_____________     Wes Velazuqez NP

## 2021-10-13 NOTE — PROGRESS NOTES
[address] [facility]  [address] [facility] [address] [facility], [address] [place] [address]  Ph: [phone]  Fax: [phone]    Medicaid Discharge Summary 2-15    Patient name: Yoli Gonzalez  : 1990  Provider#: [id number]  Referral source: Kylie Beard NP      Medical/Treatment Diagnosis: Muscle weakness (generalized) [M62.81]  Unspecified sequelae of cerebral infarction [I69.30]     Prior Hospitalization: see medical history     Comorbidities: See Plan of Care  Prior Level of Function: See Plan of Care  Medications: Verified on Patient Summary List  Start of Care: 21   Onset Date:see initial eval   Visits from Start of Care: 1  Missed Visits: 0  Certification Period : 21 to 21  Assessment/Summary of care/GOALS:   Patient referred to home health after outpatient initial evaluation. RECOMMENDATIONS:  [x]Discontinue therapy: []Patient has reached or is progressing toward set goals and is independent with HEP     []Patient is non-compliant or has abdicated     []Due to lack of appreciable progress towards set goals     [x]Other Pt needs home health and when she is safe to drive herself independently, as she has no other reliable transportation, she will be appropriate for outpatient services. Thank you. Sary Addison, PT 10/13/2021   Retain this original for your records. If you are unable to process this request in 24 hours, please contact our office.   ________________________________________________________________________  NOTE TO PHYSICIAN:  Please complete the following and FAX to: [address] [facility]:  Fax: [phone]   ____ I have read the above report and request that my patient be discharged from therapy.     Physician's Signature:_____________________________________________________ Date:_____________Time:_____________     Kylie Beard NP

## 2021-11-01 ENCOUNTER — APPOINTMENT (OUTPATIENT)
Dept: PHYSICAL THERAPY | Age: 31
End: 2021-11-01

## 2021-11-16 ENCOUNTER — APPOINTMENT (OUTPATIENT)
Dept: PHYSICAL THERAPY | Age: 31
End: 2021-11-16

## 2021-12-18 ENCOUNTER — HOSPITAL ENCOUNTER (EMERGENCY)
Age: 31
Discharge: HOME | End: 2021-12-18
Admitting: EMERGENCY MEDICINE
Payer: COMMERCIAL

## 2021-12-18 VITALS
RESPIRATION RATE: 18 BRPM | TEMPERATURE: 98.6 F | BODY MASS INDEX: 42.95 KG/M2 | WEIGHT: 290 LBS | DIASTOLIC BLOOD PRESSURE: 94 MMHG | OXYGEN SATURATION: 100 % | HEIGHT: 69 IN | SYSTOLIC BLOOD PRESSURE: 134 MMHG | HEART RATE: 71 BPM

## 2021-12-18 DIAGNOSIS — A08.4 VIRAL GASTROENTERITIS: Primary | ICD-10-CM

## 2021-12-18 DIAGNOSIS — E86.0 DEHYDRATION: ICD-10-CM

## 2021-12-18 LAB
ALBUMIN SERPL-MCNC: 3 G/DL (ref 3.5–5)
ALBUMIN/GLOB SERPL: 0.9 {RATIO} (ref 1.1–2.2)
ALP SERPL-CCNC: 37 U/L (ref 45–117)
ALT SERPL-CCNC: 17 U/L (ref 12–78)
ANION GAP SERPL CALC-SCNC: 5 MMOL/L (ref 5–15)
APPEARANCE UR: CLEAR
AST SERPL W P-5'-P-CCNC: 11 U/L (ref 15–37)
BACTERIA URNS QL MICRO: ABNORMAL /HPF
BASOPHILS # BLD: 0.1 K/UL (ref 0–0.1)
BASOPHILS NFR BLD: 1 % (ref 0–1)
BILIRUB SERPL-MCNC: 0.2 MG/DL (ref 0.2–1)
BILIRUB UR QL: NEGATIVE
BUN SERPL-MCNC: 14 MG/DL (ref 6–20)
BUN/CREAT SERPL: 21 (ref 12–20)
CA-I BLD-MCNC: 9.1 MG/DL (ref 8.5–10.1)
CHLORIDE SERPL-SCNC: 108 MMOL/L (ref 97–108)
CO2 SERPL-SCNC: 28 MMOL/L (ref 21–32)
COLOR UR: ABNORMAL
CREAT SERPL-MCNC: 0.68 MG/DL (ref 0.55–1.02)
DIFFERENTIAL METHOD BLD: ABNORMAL
EOSINOPHIL # BLD: 0.3 K/UL (ref 0–0.4)
EOSINOPHIL NFR BLD: 3 % (ref 0–7)
ERYTHROCYTE [DISTWIDTH] IN BLOOD BY AUTOMATED COUNT: 13.4 % (ref 11.5–14.5)
GLOBULIN SER CALC-MCNC: 3.5 G/DL (ref 2–4)
GLUCOSE SERPL-MCNC: 94 MG/DL (ref 65–100)
GLUCOSE UR STRIP.AUTO-MCNC: NEGATIVE MG/DL
HCG UR QL: NEGATIVE
HCT VFR BLD AUTO: 36.1 % (ref 35–47)
HGB BLD-MCNC: 11.2 G/DL (ref 11.5–16)
HGB UR QL STRIP: NEGATIVE
IMM GRANULOCYTES # BLD AUTO: 0 K/UL (ref 0–0.04)
IMM GRANULOCYTES NFR BLD AUTO: 0 % (ref 0–0.5)
KETONES UR QL STRIP.AUTO: NEGATIVE MG/DL
LEUKOCYTE ESTERASE UR QL STRIP.AUTO: NEGATIVE
LIPASE SERPL-CCNC: 62 U/L (ref 73–393)
LYMPHOCYTES # BLD: 2.9 K/UL (ref 0.8–3.5)
LYMPHOCYTES NFR BLD: 32 % (ref 12–49)
MCH RBC QN AUTO: 28.1 PG (ref 26–34)
MCHC RBC AUTO-ENTMCNC: 31 G/DL (ref 30–36.5)
MCV RBC AUTO: 90.7 FL (ref 80–99)
MONOCYTES # BLD: 0.9 K/UL (ref 0–1)
MONOCYTES NFR BLD: 10 % (ref 5–13)
MUCOUS THREADS URNS QL MICRO: ABNORMAL /LPF
NEUTS SEG # BLD: 4.8 K/UL (ref 1.8–8)
NEUTS SEG NFR BLD: 54 % (ref 32–75)
NITRITE UR QL STRIP.AUTO: NEGATIVE
NRBC # BLD: 0 K/UL (ref 0–0.01)
NRBC BLD-RTO: 0 PER 100 WBC
PH UR STRIP: 6 [PH] (ref 5–8)
PLATELET # BLD AUTO: 352 K/UL (ref 150–400)
PMV BLD AUTO: 10.2 FL (ref 8.9–12.9)
POTASSIUM SERPL-SCNC: 4 MMOL/L (ref 3.5–5.1)
PROT SERPL-MCNC: 6.5 G/DL (ref 6.4–8.2)
PROT UR STRIP-MCNC: NEGATIVE MG/DL
RBC # BLD AUTO: 3.98 M/UL (ref 3.8–5.2)
RBC #/AREA URNS HPF: ABNORMAL /HPF (ref 0–5)
SODIUM SERPL-SCNC: 141 MMOL/L (ref 136–145)
SP GR UR REFRACTOMETRY: 1.02 (ref 1–1.03)
UA: UC IF INDICATED,UAUC: ABNORMAL
UROBILINOGEN UR QL STRIP.AUTO: 0.1 EU/DL (ref 0.1–1)
WBC # BLD AUTO: 8.9 K/UL (ref 3.6–11)
WBC URNS QL MICRO: ABNORMAL /HPF (ref 0–4)

## 2021-12-18 PROCEDURE — 81001 URINALYSIS AUTO W/SCOPE: CPT

## 2021-12-18 PROCEDURE — 74011250636 HC RX REV CODE- 250/636: Performed by: PHYSICIAN ASSISTANT

## 2021-12-18 PROCEDURE — 81025 URINE PREGNANCY TEST: CPT

## 2021-12-18 PROCEDURE — 96374 THER/PROPH/DIAG INJ IV PUSH: CPT

## 2021-12-18 PROCEDURE — 85025 COMPLETE CBC W/AUTO DIFF WBC: CPT

## 2021-12-18 PROCEDURE — 99283 EMERGENCY DEPT VISIT LOW MDM: CPT

## 2021-12-18 PROCEDURE — 36415 COLL VENOUS BLD VENIPUNCTURE: CPT

## 2021-12-18 PROCEDURE — 83690 ASSAY OF LIPASE: CPT

## 2021-12-18 PROCEDURE — 80053 COMPREHEN METABOLIC PANEL: CPT

## 2021-12-18 RX ORDER — ONDANSETRON 4 MG/1
4 TABLET, FILM COATED ORAL
Qty: 20 TABLET | Refills: 0 | Status: SHIPPED | OUTPATIENT
Start: 2021-12-18

## 2021-12-18 RX ORDER — ONDANSETRON 2 MG/ML
4 INJECTION INTRAMUSCULAR; INTRAVENOUS ONCE
Status: COMPLETED | OUTPATIENT
Start: 2021-12-18 | End: 2021-12-18

## 2021-12-18 RX ADMIN — SODIUM CHLORIDE 1000 ML: 9 INJECTION, SOLUTION INTRAVENOUS at 09:45

## 2021-12-18 RX ADMIN — ONDANSETRON 4 MG: 2 INJECTION INTRAMUSCULAR; INTRAVENOUS at 09:46

## 2021-12-18 NOTE — Clinical Note
[facility] EMERGENCY DEPT  [address] [place] [address] [place] [id number]  [phone]    Work/School Note    Date: 12/18/2021    To Whom It May concern:      Evaristo Holland was seen and treated today in the emergency room by the following provider(s):  Physician Assistant: Celine Frank PA-C. Evaristo Holland is excused from work/school on 12/18/21. She is clear to return to work/school on 12/19/21.         Sincerely,          Juan Alberto Krishnamurthy PA-C

## 2021-12-18 NOTE — ED PROVIDER NOTES
EMERGENCY DEPARTMENT HISTORY AND PHYSICAL EXAM      Date: 12/18/2021  Patient Name: Siena Schumacher    History of Presenting Illness     Chief Complaint   Patient presents with    Vomiting       History Provided By: Patient    HPI: Siena Schumacher, 27 y.o. female with a past medical history significant for asthma who presents to the ED with cc of nausea and vomit x 3 days. Pt reports 5-7 episodes of emesis over the past several days, noting onset immediately after eating. She also c/o intermittent \"cramping\" epigastric abdominal pain. Denies any modifying factors of pain. Denies treating sx's with anything since onset. Pt denies any hematemesis, diarrhea, blood in stool, difficulty urinating, dysuria, hematuria, vaginal discharge or bleeding. She reports her LMP was 12/2/2021 but does endorse some possibility of pregnancy. Pt has no further concerns and specifically denies any chest pain, shortness of breath, palpitations, fever, chills, cough, headaches, light headedness, dizziness, diaphoresis, or rash. There are no other complaints, changes, or physical findings at this time. PCP: Smita Amador NP    No current facility-administered medications on file prior to encounter. Current Outpatient Medications on File Prior to Encounter   Medication Sig Dispense Refill    azithromycin (Zithromax Z-Stephan) 250 mg tablet Take 2 tablet p.o. day 1 then 1 tablet p.o. day 2 through 5 6 Tablet 0    albuterol (PROVENTIL HFA, VENTOLIN HFA, PROAIR HFA) 90 mcg/actuation inhaler Take 2 Puffs by inhalation every four (4) hours as needed for Wheezing. 90 g 0    dextromethorphan-guaiFENesin (Robitussin Cough-Chest Uriel DM) 5-100 mg/5 mL liqd Take 5 mL by mouth every six (6) hours. 200 mL 0    diphenoxylate-atropine (LomotiL) 2.5-0.025 mg per tablet Take 1 Tablet by mouth four (4) times daily as needed for Diarrhea (1 tab after each stool for max 8 per day). Max Daily Amount: 4 Tablets.  Take after each stool for a maximum of 8 tablets daily 20 Tablet 0    [DISCONTINUED] ondansetron (Zofran ODT) 4 mg disintegrating tablet 1 Tablet by SubLINGual route every eight (8) hours as needed for Nausea or Vomiting. 20 Tablet 0    aspirin (ASPIRIN) 325 mg tablet Take 1 Tablet by mouth daily. 30 Tablet 0    atorvastatin (LIPITOR) 40 mg tablet Take 1 Tablet by mouth nightly. 30 Tablet 0    butalbital-acetaminophen-caffeine (FIORICET, ESGIC) -40 mg per tablet Take 1 Tablet by mouth every six (6) hours as needed for Headache. 30 Tablet 0    topiramate (TOPAMAX) 25 mg sprinkle capsule Take 1 Capsule by mouth every twelve (12) hours. 30 Capsule 0    albuterol (PROVENTIL HFA, VENTOLIN HFA, PROAIR HFA) 90 mcg/actuation inhaler Take 2 Puffs by inhalation every four (4) hours as needed for Wheezing. 1 Inhaler 0    fluticasone propionate (FLONASE) 50 mcg/actuation nasal spray 2 Sprays by Both Nostrils route daily. (Patient not taking: Reported on 2021) 1 Bottle 0       Past History     Past Medical History:  Past Medical History:   Diagnosis Date    Asthma        Past Surgical History:  No past surgical history on file. Family History:  No family history on file. Social History:  Social History     Tobacco Use    Smoking status: Former Smoker     Quit date: 2020     Years since quittin.4    Smokeless tobacco: Never Used   Substance Use Topics    Alcohol use: Never    Drug use: Yes     Types: Marijuana       Allergies:  No Known Allergies      Review of Systems     Review of Systems   Constitutional: Negative. Negative for chills, diaphoresis and fever. HENT: Negative. Negative for congestion, rhinorrhea and sore throat. Eyes: Negative. Respiratory: Negative. Negative for cough, chest tightness, shortness of breath and wheezing. Cardiovascular: Negative. Negative for chest pain and palpitations. Gastrointestinal: Positive for abdominal pain, nausea and vomiting.  Negative for blood in stool, constipation and diarrhea. Genitourinary: Negative. Negative for difficulty urinating, dysuria, flank pain, frequency, hematuria, vaginal bleeding and vaginal discharge. Musculoskeletal: Negative. Negative for back pain. Skin: Negative. Negative for rash. Neurological: Negative. Negative for dizziness, weakness, light-headedness, numbness and headaches. Psychiatric/Behavioral: Negative. All other systems reviewed and are negative. Physical Exam     Physical Exam  Vitals and nursing note reviewed. Constitutional:       General: She is not in acute distress. Appearance: Normal appearance. She is obese. She is not ill-appearing or toxic-appearing. HENT:      Head: Normocephalic and atraumatic. Mouth/Throat:      Mouth: Mucous membranes are dry. Pharynx: Oropharynx is clear. Eyes:      Extraocular Movements: Extraocular movements intact. Conjunctiva/sclera: Conjunctivae normal.      Pupils: Pupils are equal, round, and reactive to light. Cardiovascular:      Rate and Rhythm: Normal rate and regular rhythm. Pulses: Normal pulses. Heart sounds: Normal heart sounds, S1 normal and S2 normal. No murmur heard. No friction rub. No gallop. Pulmonary:      Effort: Pulmonary effort is normal.      Breath sounds: Normal breath sounds. No wheezing, rhonchi or rales. Abdominal:      General: Bowel sounds are normal. There is no distension. Palpations: Abdomen is soft. Tenderness: There is abdominal tenderness in the epigastric area. There is no right CVA tenderness, left CVA tenderness, guarding or rebound. Negative signs include Boyd's sign and McBurney's sign. Musculoskeletal:      Cervical back: Neck supple. No tenderness. Skin:     General: Skin is warm and dry. Capillary Refill: Capillary refill takes less than 2 seconds. Findings: No rash. Neurological:      General: No focal deficit present.       Mental Status: She is alert and oriented to person, place, and time. Psychiatric:         Mood and Affect: Mood normal.         Behavior: Behavior normal.         Lab and Diagnostic Study Results     Labs -     Recent Results (from the past 12 hour(s))   CBC WITH AUTOMATED DIFF    Collection Time: 12/18/21  9:41 AM   Result Value Ref Range    WBC 8.9 3.6 - 11.0 K/uL    RBC 3.98 3.80 - 5.20 M/uL    HGB 11.2 (L) 11.5 - 16.0 g/dL    HCT 36.1 35.0 - 47.0 %    MCV 90.7 80.0 - 99.0 FL    MCH 28.1 26.0 - 34.0 PG    MCHC 31.0 30.0 - 36.5 g/dL    RDW 13.4 11.5 - 14.5 %    PLATELET 051 854 - 627 K/uL    MPV 10.2 8.9 - 12.9 FL    NRBC 0.0 0.0  WBC    ABSOLUTE NRBC 0.00 0.00 - 0.01 K/uL    NEUTROPHILS 54 32 - 75 %    LYMPHOCYTES 32 12 - 49 %    MONOCYTES 10 5 - 13 %    EOSINOPHILS 3 0 - 7 %    BASOPHILS 1 0 - 1 %    IMMATURE GRANULOCYTES 0 0 - 0.5 %    ABS. NEUTROPHILS 4.8 1.8 - 8.0 K/UL    ABS. LYMPHOCYTES 2.9 0.8 - 3.5 K/UL    ABS. MONOCYTES 0.9 0.0 - 1.0 K/UL    ABS. EOSINOPHILS 0.3 0.0 - 0.4 K/UL    ABS. BASOPHILS 0.1 0.0 - 0.1 K/UL    ABS. IMM. GRANS. 0.0 0.00 - 0.04 K/UL    DF AUTOMATED     METABOLIC PANEL, COMPREHENSIVE    Collection Time: 12/18/21  9:41 AM   Result Value Ref Range    Sodium 141 136 - 145 mmol/L    Potassium 4.0 3.5 - 5.1 mmol/L    Chloride 108 97 - 108 mmol/L    CO2 28 21 - 32 mmol/L    Anion gap 5 5 - 15 mmol/L    Glucose 94 65 - 100 mg/dL    BUN 14 6 - 20 mg/dL    Creatinine 0.68 0.55 - 1.02 mg/dL    BUN/Creatinine ratio 21 (H) 12 - 20      GFR est AA >60 >60 ml/min/1.73m2    GFR est non-AA >60 >60 ml/min/1.73m2    Calcium 9.1 8.5 - 10.1 mg/dL    Bilirubin, total 0.2 0.2 - 1.0 mg/dL    AST (SGOT) 11 (L) 15 - 37 U/L    ALT (SGPT) 17 12 - 78 U/L    Alk.  phosphatase 37 (L) 45 - 117 U/L    Protein, total 6.5 6.4 - 8.2 g/dL    Albumin 3.0 (L) 3.5 - 5.0 g/dL    Globulin 3.5 2.0 - 4.0 g/dL    A-G Ratio 0.9 (L) 1.1 - 2.2     LIPASE    Collection Time: 12/18/21  9:41 AM   Result Value Ref Range    Lipase 62 (L) 73 - 393 U/L   HCG URINE, QL    Collection Time: 12/18/21  9:50 AM   Result Value Ref Range    HCG urine, QL Negative Negative     URINALYSIS W/ REFLEX CULTURE    Collection Time: 12/18/21  9:51 AM    Specimen: Urine   Result Value Ref Range    Color Yellow/Straw      Appearance Clear Clear      Specific gravity 1.016 1.003 - 1.030      pH (UA) 6.0 5.0 - 8.0      Protein Negative Negative mg/dL    Glucose Negative Negative mg/dL    Ketone Negative Negative mg/dL    Bilirubin Negative Negative      Blood Negative Negative      Urobilinogen 0.1 0.1 - 1.0 EU/dL    Nitrites Negative Negative      Leukocyte Esterase Negative Negative      UA:UC IF INDICATED Culture not indicated by UA result Culture not indicated by UA result      WBC 0-4 0 - 4 /hpf    RBC 0-5 0 - 5 /hpf    Bacteria 1+ (A) Negative /hpf    Mucus Trace /lpf       Radiologic Studies -   @lastxrresult@  CT Results  (Last 48 hours)    None        CXR Results  (Last 48 hours)    None            Medical Decision Making   - I am the first provider for this patient. - I reviewed the vital signs, available nursing notes, past medical history, past surgical history, family history and social history. - Initial assessment performed. The patients presenting problems have been discussed, and they are in agreement with the care plan formulated and outlined with them. I have encouraged them to ask questions as they arise throughout their visit. Vital Signs-Reviewed the patient's vital signs.   Patient Vitals for the past 12 hrs:   Temp Pulse Resp BP SpO2   12/18/21 0936 98.6 °F (37 °C) 71 18 (!) 134/94 100 %       Records Reviewed: Nursing Notes and Old Medical Records       Provider Notes (Medical Decision Making):     MDM  Number of Diagnoses or Management Options  Diagnosis management comments: Pt presents with acute abdominal pain; vital signs stable with currently a non-peritoneal exam; DDx includes: Gastroenteritis, hepatitis, pancreatitis, obstruction, appendicitis, viral illness, IBD, diverticulitis, mesenteric ischemia, AAA or descending dissection, ACS, kidney stone. Will get labs, treat symptomatically and obtain serial abdominal exams to determine if additional imaging is indicated. Will reassess and monitor closely. Amount and/or Complexity of Data Reviewed  Clinical lab tests: ordered and reviewed  Review and summarize past medical records: yes       ED Course:   11:19 AM  Pt reassessed and updated on all results and findings. She reports nausea has resolved following zofran and pt has tolerated PO fluids without difficulty. No additional complaints or concerns. All questions answered. Pt educated on return precautions and red flags. She conveys good understanding and agreement with care plan as outlined and agrees to close follow up with her PCP. Anticipate discharge home shortly. Procedures   Medical Decision Makingedical Decision Making  Performed by: Gaudencio Hernandez PA-C  PROCEDURES:  Procedures       Disposition   Disposition: DC- Adult Discharges: All of the diagnostic tests were reviewed and questions answered. Diagnosis, care plan and treatment options were discussed. The patient understands the instructions and will follow up as directed. The patients results have been reviewed with them. They have been counseled regarding their diagnosis. The patient verbally convey understanding and agreement of the signs, symptoms, diagnosis, treatment and prognosis and additionally agrees to follow up as recommended with their PCP in 24 - 48 hours. They also agree with the care-plan and convey that all of their questions have been answered.   I have also put together some discharge instructions for them that include: 1) educational information regarding their diagnosis, 2) how to care for their diagnosis at home, as well a 3) list of reasons why they would want to return to the ED prior to their follow-up appointment, should their condition change. Discharged    DISCHARGE PLAN:  1. Current Discharge Medication List      CONTINUE these medications which have NOT CHANGED    Details   azithromycin (Zithromax Z-Stephan) 250 mg tablet Take 2 tablet p.o. day 1 then 1 tablet p.o. day 2 through 5  Qty: 6 Tablet, Refills: 0      !! albuterol (PROVENTIL HFA, VENTOLIN HFA, PROAIR HFA) 90 mcg/actuation inhaler Take 2 Puffs by inhalation every four (4) hours as needed for Wheezing. Qty: 90 g, Refills: 0      dextromethorphan-guaiFENesin (Robitussin Cough-Chest Uriel DM) 5-100 mg/5 mL liqd Take 5 mL by mouth every six (6) hours. Qty: 200 mL, Refills: 0      diphenoxylate-atropine (LomotiL) 2.5-0.025 mg per tablet Take 1 Tablet by mouth four (4) times daily as needed for Diarrhea (1 tab after each stool for max 8 per day). Max Daily Amount: 4 Tablets. Take after each stool for a maximum of 8 tablets daily  Qty: 20 Tablet, Refills: 0    Associated Diagnoses: Suspected COVID-19 virus infection      ondansetron (Zofran ODT) 4 mg disintegrating tablet 1 Tablet by SubLINGual route every eight (8) hours as needed for Nausea or Vomiting. Qty: 20 Tablet, Refills: 0      aspirin (ASPIRIN) 325 mg tablet Take 1 Tablet by mouth daily. Qty: 30 Tablet, Refills: 0      atorvastatin (LIPITOR) 40 mg tablet Take 1 Tablet by mouth nightly. Qty: 30 Tablet, Refills: 0      butalbital-acetaminophen-caffeine (FIORICET, ESGIC) -40 mg per tablet Take 1 Tablet by mouth every six (6) hours as needed for Headache. Qty: 30 Tablet, Refills: 0      topiramate (TOPAMAX) 25 mg sprinkle capsule Take 1 Capsule by mouth every twelve (12) hours. Qty: 30 Capsule, Refills: 0      !! albuterol (PROVENTIL HFA, VENTOLIN HFA, PROAIR HFA) 90 mcg/actuation inhaler Take 2 Puffs by inhalation every four (4) hours as needed for Wheezing. Qty: 1 Inhaler, Refills: 0      fluticasone propionate (FLONASE) 50 mcg/actuation nasal spray 2 Sprays by Both Nostrils route daily.   Qty: 1 Bottle, Refills: 0 !! - Potential duplicate medications found. Please discuss with provider. 2.   Follow-up Information     Follow up With Specialties Details Why Contact Info    Jabier Black NP Nurse Practitioner  As needed, If symptoms worsen [address] [facility] [address] [facility] 16      Kimberly Flores MD Gastroenterology  If symptoms worsen [facility]  [address]           3. Return to ED if worse   4. Current Discharge Medication List      START taking these medications    Details   ondansetron hcl (Zofran) 4 mg tablet Take 1 Tablet by mouth every eight (8) hours as needed for Nausea or Vomiting. Qty: 20 Tablet, Refills: 0  Start date: 12/18/2021         STOP taking these medications       ondansetron (Zofran ODT) 4 mg disintegrating tablet Comments:   Reason for Stopping:                 Diagnosis     Clinical Impression:   1. Viral gastroenteritis    2. Dehydration        Attestations:    Patience Krishnamurthy PA-C    Please note that this dictation was completed with [employer], the computer voice recognition software. Quite often unanticipated grammatical, syntax, homophones, and other interpretive errors are inadvertently transcribed by the computer software. Please disregard these errors. Please excuse any errors that have escaped final proofreading. Thank you.

## 2022-03-18 PROBLEM — I63.9 CVA (CEREBRAL VASCULAR ACCIDENT) (HCC): Status: ACTIVE | Noted: 2021-08-26

## 2022-08-03 ENCOUNTER — HOSPITAL ENCOUNTER (EMERGENCY)
Age: 32
Discharge: HOME | End: 2022-08-03
Attending: EMERGENCY MEDICINE
Payer: COMMERCIAL

## 2022-08-03 ENCOUNTER — APPOINTMENT (OUTPATIENT)
Dept: GENERAL RADIOLOGY | Age: 32
End: 2022-08-03
Attending: EMERGENCY MEDICINE
Payer: COMMERCIAL

## 2022-08-03 ENCOUNTER — APPOINTMENT (OUTPATIENT)
Dept: CT IMAGING | Age: 32
End: 2022-08-03
Attending: EMERGENCY MEDICINE
Payer: COMMERCIAL

## 2022-08-03 VITALS
DIASTOLIC BLOOD PRESSURE: 99 MMHG | WEIGHT: 293 LBS | HEART RATE: 65 BPM | BODY MASS INDEX: 43.4 KG/M2 | RESPIRATION RATE: 15 BRPM | OXYGEN SATURATION: 99 % | TEMPERATURE: 98.3 F | HEIGHT: 69 IN | SYSTOLIC BLOOD PRESSURE: 132 MMHG

## 2022-08-03 DIAGNOSIS — M79.10 MYALGIA: ICD-10-CM
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
## 2022-08-03 LAB
ABO + RH BLD: NORMAL
ALBUMIN SERPL-MCNC: 3.3 G/DL (ref 3.5–5)
ALBUMIN/GLOB SERPL: 0.9 {RATIO} (ref 1.1–2.2)
ALP SERPL-CCNC: 30 U/L (ref 45–117)
ALT SERPL-CCNC: 23 U/L (ref 12–78)
ANION GAP SERPL CALC-SCNC: 6 MMOL/L (ref 5–15)
AST SERPL W P-5'-P-CCNC: 14 U/L (ref 15–37)
BILIRUB SERPL-MCNC: 0.3 MG/DL (ref 0.2–1)
BLOOD GROUP ANTIBODIES SERPL: NEGATIVE
BUN SERPL-MCNC: 14 MG/DL (ref 6–20)
BUN/CREAT SERPL: 19 (ref 12–20)
CA-I BLD-MCNC: 9.3 MG/DL (ref 8.5–10.1)
CHLORIDE SERPL-SCNC: 110 MMOL/L (ref 97–108)
CO2 SERPL-SCNC: 22 MMOL/L (ref 21–32)
CREAT SERPL-MCNC: 0.73 MG/DL (ref 0.55–1.02)
ERYTHROCYTE [DISTWIDTH] IN BLOOD BY AUTOMATED COUNT: 13.9 % (ref 11.5–14.5)
ETHANOL SERPL-MCNC: <10 MG/DL
GLOBULIN SER CALC-MCNC: 3.6 G/DL (ref 2–4)
GLUCOSE SERPL-MCNC: 98 MG/DL (ref 65–100)
HCT VFR BLD AUTO: 35.1 % (ref 35–47)
HGB BLD-MCNC: 11.9 G/DL (ref 11.5–16)
LACTATE SERPL-SCNC: 2.2 MMOL/L (ref 0.4–2)
LIPASE SERPL-CCNC: 66 U/L (ref 73–393)
MCH RBC QN AUTO: 28.7 PG (ref 26–34)
MCHC RBC AUTO-ENTMCNC: 33.9 G/DL (ref 30–36.5)
MCV RBC AUTO: 84.8 FL (ref 80–99)
NRBC # BLD: 0 K/UL (ref 0–0.01)
NRBC BLD-RTO: 0 PER 100 WBC
PLATELET # BLD AUTO: 323 K/UL (ref 150–400)
PMV BLD AUTO: 9.7 FL (ref 8.9–12.9)
POTASSIUM SERPL-SCNC: 3.9 MMOL/L (ref 3.5–5.1)
PROT SERPL-MCNC: 6.9 G/DL (ref 6.4–8.2)
RBC # BLD AUTO: 4.14 M/UL (ref 3.8–5.2)
SODIUM SERPL-SCNC: 138 MMOL/L (ref 136–145)
SPECIMEN EXP DATE BLD: NORMAL
TROPONIN-HIGH SENSITIVITY: 3 NG/L (ref 0–51)
WBC # BLD AUTO: 9.7 K/UL (ref 3.6–11)

## 2022-08-03 PROCEDURE — 36415 COLL VENOUS BLD VENIPUNCTURE: CPT

## 2022-08-03 PROCEDURE — 73552 X-RAY EXAM OF FEMUR 2/>: CPT

## 2022-08-03 PROCEDURE — 70450 CT HEAD/BRAIN W/O DYE: CPT

## 2022-08-03 PROCEDURE — 85027 COMPLETE CBC AUTOMATED: CPT

## 2022-08-03 PROCEDURE — 74011250636 HC RX REV CODE- 250/636: Performed by: EMERGENCY MEDICINE

## 2022-08-03 PROCEDURE — 73080 X-RAY EXAM OF ELBOW: CPT

## 2022-08-03 PROCEDURE — 74011000636 HC RX REV CODE- 636: Performed by: EMERGENCY MEDICINE

## 2022-08-03 PROCEDURE — 73590 X-RAY EXAM OF LOWER LEG: CPT

## 2022-08-03 PROCEDURE — 82077 ASSAY SPEC XCP UR&BREATH IA: CPT

## 2022-08-03 PROCEDURE — 84484 ASSAY OF TROPONIN QUANT: CPT

## 2022-08-03 PROCEDURE — 73630 X-RAY EXAM OF FOOT: CPT

## 2022-08-03 PROCEDURE — 80053 COMPREHEN METABOLIC PANEL: CPT

## 2022-08-03 PROCEDURE — 72125 CT NECK SPINE W/O DYE: CPT

## 2022-08-03 PROCEDURE — 74011250636 HC RX REV CODE- 250/636

## 2022-08-03 PROCEDURE — 73030 X-RAY EXAM OF SHOULDER: CPT

## 2022-08-03 PROCEDURE — 71260 CT THORAX DX C+: CPT

## 2022-08-03 PROCEDURE — 99285 EMERGENCY DEPT VISIT HI MDM: CPT

## 2022-08-03 PROCEDURE — 86900 BLOOD TYPING SEROLOGIC ABO: CPT

## 2022-08-03 PROCEDURE — 83690 ASSAY OF LIPASE: CPT

## 2022-08-03 PROCEDURE — 71045 X-RAY EXAM CHEST 1 VIEW: CPT

## 2022-08-03 PROCEDURE — 96374 THER/PROPH/DIAG INJ IV PUSH: CPT

## 2022-08-03 PROCEDURE — 96375 TX/PRO/DX INJ NEW DRUG ADDON: CPT

## 2022-08-03 PROCEDURE — 83605 ASSAY OF LACTIC ACID: CPT

## 2022-08-03 RX ORDER — ACETAMINOPHEN 500 MG
1000 TABLET ORAL EVERY 8 HOURS
Qty: 42 TABLET | Refills: 0 | Status: SHIPPED | OUTPATIENT
Start: 2022-08-03 | End: 2022-08-10

## 2022-08-03 RX ORDER — FENTANYL CITRATE 50 UG/ML
INJECTION, SOLUTION INTRAMUSCULAR; INTRAVENOUS
Status: COMPLETED
Start: 2022-08-03 | End: 2022-08-03

## 2022-08-03 RX ORDER — FENTANYL CITRATE 50 UG/ML
50 INJECTION, SOLUTION INTRAMUSCULAR; INTRAVENOUS
Status: COMPLETED | OUTPATIENT
Start: 2022-08-03 | End: 2022-08-03

## 2022-08-03 RX ORDER — MORPHINE SULFATE 2 MG/ML
6 INJECTION, SOLUTION INTRAMUSCULAR; INTRAVENOUS ONCE
Status: COMPLETED | OUTPATIENT
Start: 2022-08-03 | End: 2022-08-03

## 2022-08-03 RX ORDER — IBUPROFEN 800 MG/1
800 TABLET ORAL
Qty: 20 TABLET | Refills: 0 | Status: SHIPPED | OUTPATIENT
Start: 2022-08-03 | End: 2022-08-10

## 2022-08-03 RX ADMIN — FENTANYL CITRATE 50 MCG: 50 INJECTION, SOLUTION INTRAMUSCULAR; INTRAVENOUS at 10:40

## 2022-08-03 RX ADMIN — MORPHINE SULFATE 6 MG: 2 INJECTION, SOLUTION INTRAMUSCULAR; INTRAVENOUS at 10:50

## 2022-08-03 RX ADMIN — IOPAMIDOL 100 ML: 755 INJECTION, SOLUTION INTRAVENOUS at 11:40
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
## 2022-08-03 NOTE — DISCHARGE INSTRUCTIONS
Thank you! Thank you for allowing me to care for you in the emergency department. It is my goal to provide you with excellent care. If you have not received excellent quality care, please ask to speak to the nurse manager. Please fill out the survey that will come to you by mail or email since we listen to your feedback! Below you will find a list of your tests from today's visit. Should you have any questions, please do not hesitate to call the emergency department. Labs  Recent Results (from the past 12 hour(s))   CBC W/O DIFF    Collection Time: 08/03/22 10:42 AM   Result Value Ref Range    WBC 9.7 3.6 - 11.0 K/uL    RBC 4.14 3.80 - 5.20 M/uL    HGB 11.9 11.5 - 16.0 g/dL    HCT 35.1 35.0 - 47.0 %    MCV 84.8 80.0 - 99.0 FL    MCH 28.7 26.0 - 34.0 PG    MCHC 33.9 30.0 - 36.5 g/dL    RDW 13.9 11.5 - 14.5 %    PLATELET 192 222 - 007 K/uL    MPV 9.7 8.9 - 12.9 FL    NRBC 0.0 0.0  WBC    ABSOLUTE NRBC 0.00 0.00 - 8.46 K/uL   METABOLIC PANEL, COMPREHENSIVE    Collection Time: 08/03/22 10:42 AM   Result Value Ref Range    Sodium 138 136 - 145 mmol/L    Potassium 3.9 3.5 - 5.1 mmol/L    Chloride 110 (H) 97 - 108 mmol/L    CO2 22 21 - 32 mmol/L    Anion gap 6 5 - 15 mmol/L    Glucose 98 65 - 100 mg/dL    BUN 14 6 - 20 mg/dL    Creatinine 0.73 0.55 - 1.02 mg/dL    BUN/Creatinine ratio 19 12 - 20      GFR est AA >60 >60 ml/min/1.73m2    GFR est non-AA >60 >60 ml/min/1.73m2    Calcium 9.3 8.5 - 10.1 mg/dL    Bilirubin, total 0.3 0.2 - 1.0 mg/dL    AST (SGOT) 14 (L) 15 - 37 U/L    ALT (SGPT) 23 12 - 78 U/L    Alk.  phosphatase 30 (L) 45 - 117 U/L    Protein, total 6.9 6.4 - 8.2 g/dL    Albumin 3.3 (L) 3.5 - 5.0 g/dL    Globulin 3.6 2.0 - 4.0 g/dL    A-G Ratio 0.9 (L) 1.1 - 2.2     LIPASE    Collection Time: 08/03/22 10:42 AM   Result Value Ref Range    Lipase 66 (L) 73 - 393 U/L   ETHYL ALCOHOL    Collection Time: 08/03/22 10:42 AM   Result Value Ref Range    ALCOHOL(ETHYL),SERUM <10 <10 mg/dL   LACTIC ACID Collection Time: 08/03/22 10:42 AM   Result Value Ref Range    Lactic acid 2.2 (HH) 0.4 - 2.0 mmol/L   TYPE & SCREEN    Collection Time: 08/03/22 10:42 AM   Result Value Ref Range    Crossmatch Expiration 08/06/2022,2359     ABO/Rh(D) AB Positive     Antibody screen Negative    TROPONIN-HIGH SENSITIVITY    Collection Time: 08/03/22 10:42 AM   Result Value Ref Range    Troponin-High Sensitivity 3 0 - 51 ng/L       Radiologic Studies  XR FOOT RT MIN 3 V   Final Result   Diffuse soft tissue swelling. No fracture or radiopaque foreign   bodies. CT HEAD WO CONT   Final Result   1. No evidence of acute intracranial abnormality. 2. Apparent strabismus, nonspecific and may be positional. Correlate clinically   to exclude cranial (III) injury. CT SPINE CERV WO CONT   Final Result   No fracture or subluxation is identified. CT CHEST ABD PELV W CONT   Final Result   No acute abnormality      XR CHEST PORT   Final Result   No evidence of acute cardiopulmonary process. XR FEMUR LT 2 V   Final Result   No acute abnormality. XR TIB/FIB LT   Final Result   No acute abnormality. XR ELBOW LT MIN 3 V   Final Result   No acute abnormality. XR SHOULDER LT AP/LAT MIN 2 V   Final Result   No acute abnormality. CT Results  (Last 48 hours)                 08/03/22 1140  CT HEAD WO CONT Final result    Impression:  1. No evidence of acute intracranial abnormality. 2. Apparent strabismus, nonspecific and may be positional. Correlate clinically   to exclude cranial (III) injury. Narrative:  EXAM:  CT HEAD WO CONT       INDICATION:   79-year-old female with head trauma. COMPARISON: 10/6/2021 and MRI. TECHNIQUE: Unenhanced CT of the head was performed using 5 mm images. Brain and   bone windows were generated. CT dose reduction was achieved through use of a   standardized protocol tailored for this examination and automatic exposure   control for dose modulation. FINDINGS:   The ventricles are normal in size and position. Basilar cisterns are patent. No   midline shift. There is no evidence of acute infarct, hemorrhage, or extraaxial   fluid collection. Diffuse mucosal thickening of the ethmoid sinuses bilaterally. 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 The orbital   contents are within normal limits. There are no significant osseous or   extracranial soft tissue lesions. 08/03/22 1140  CT SPINE CERV WO CONT Final result    Impression:  No fracture or subluxation is identified. Narrative:  EXAM:  CT CERVICAL SPINE WITHOUT CONTRAST       INDICATION: Injury. COMPARISON: None. CONTRAST:  None. TECHNIQUE: Multislice helical CT of the cervical spine was performed without   intravenous contrast administration. Sagittal and coronal reformats were   generated. CT dose reduction was achieved through use of a standardized   protocol tailored for this examination and automatic exposure control for dose   modulation. Study is compromised by patient's body habitus. FINDINGS:   There is slight reversal of the normal cervical lordosis. The alignment is within normal limits. There is no fracture or compression   deformity. The odontoid process is intact. The craniocervical junction is within   normal limits. The incidentally imaged soft tissues are within normal limits. C2-C3: There is no spinal canal or neural foraminal stenosis. Small central   bulge of the disc. C3-C4: There is no spinal canal or neural foraminal stenosis. C4-C5: There is no spinal canal or neural foraminal stenosis. C5-C6: There is no spinal canal or neural foraminal stenosis. C6-C7: There is no spinal canal or neural foraminal stenosis. C7-T1: There is no spinal canal or neural foraminal stenosis.            08/03/22 1140  CT CHEST ABD PELV W CONT Final result    Impression:  No acute abnormality       Narrative:  EXAM: CT CHEST ABD PELV W CONT       INDICATION: Injury       COMPARISON: None       IV CONTRAST: 100 mL of [id number]. ORAL CONTRAST: None       TECHNIQUE:    Following the uneventful intravenous administration of contrast, thin axial   images were obtained through the chest, abdomen and pelvis. Coronal and sagittal   reformats were generated. CT dose reduction was achieved through use of a   standardized protocol tailored for this examination and automatic exposure   control for dose modulation. Arterial phase images were obtained through the   chest abdomen and pelvis. Portal venous phase images were obtained through the   abdomen and pelvis. MIP reconstructions were performed       FINDINGS:        CHEST WALL: No mass or axillary lymphadenopathy. THYROID: No nodule. MEDIASTINUM: No mass or lymphadenopathy. MEGA: No mass or lymphadenopathy. THORACIC AORTA: No dissection or aneurysm. MAIN PULMONARY ARTERY: Normal in caliber. TRACHEA/BRONCHI: Patent. ESOPHAGUS: No wall thickening or dilatation. HEART: Normal in size. PLEURA: No effusion or pneumothorax. LUNGS: No nodule, mass, or airspace disease. LIVER: No mass. BILIARY TREE: Gallbladder is within normal limits. CBD is not dilated. SPLEEN: within normal limits. PANCREAS: No mass or ductal dilatation. ADRENALS: Unremarkable. KIDNEYS: No mass, calculus, or hydronephrosis. STOMACH: Unremarkable. SMALL BOWEL: No dilatation or wall thickening. COLON: No dilatation or wall thickening. APPENDIX: Normal   PERITONEUM: No ascites or pneumoperitoneum. RETROPERITONEUM: No lymphadenopathy or aortic aneurysm. REPRODUCTIVE ORGANS: Not enlarged   URINARY BLADDER: No mass or calculus. BONES: No destructive bone lesion. ABDOMINAL WALL: No mass or hernia.    ADDITIONAL COMMENTS: N/A                 CXR Results  (Last 48 hours)                 08/03/22 1107  XR CHEST PORT Final result Impression:  No evidence of acute cardiopulmonary process. Narrative:  INDICATION:  Injury        FINDINGS: Single AP portable view of the chest obtained at 1045 demonstrates a   normal cardiomediastinal silhouette. The lungs are hypoinspiratory but clear   bilaterally. No osseous abnormalities are seen. ------------------------------------------------------------------------------------------------------------  The exam and treatment you received in the Emergency Department were for an urgent problem and are not intended as complete care. It is important that you follow-up with a doctor, nurse practitioner, or physician assistant to:  (1) confirm your diagnosis,  (2) re-evaluation of changes in your illness and treatment, and  (3) for ongoing care. Please take your discharge instructions with you when you go to your follow-up appointment. If you have any problem arranging a follow-up appointment, contact the Emergency Department. If your symptoms become worse or you do not improve as expected and you are unable to reach your health care provider, please return to the Emergency Department. We are available 24 hours a day. If a prescription has been provided, please have it filled as soon as possible to prevent a delay in treatment. If you have any questions or reservations about taking the medication due to side effects or interactions with other medications, please call your primary care provider or contact the ER.
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
## 2022-09-01 ENCOUNTER — APPOINTMENT (OUTPATIENT)
Dept: GENERAL RADIOLOGY | Age: 32
End: 2022-09-01
Attending: EMERGENCY MEDICINE
Payer: COMMERCIAL

## 2022-09-01 ENCOUNTER — HOSPITAL ENCOUNTER (EMERGENCY)
Age: 32
Discharge: HOME | End: 2022-09-01
Attending: EMERGENCY MEDICINE
Payer: COMMERCIAL

## 2022-09-01 VITALS
WEIGHT: 293 LBS | HEART RATE: 65 BPM | TEMPERATURE: 99 F | RESPIRATION RATE: 16 BRPM | OXYGEN SATURATION: 100 % | BODY MASS INDEX: 43.4 KG/M2 | DIASTOLIC BLOOD PRESSURE: 67 MMHG | SYSTOLIC BLOOD PRESSURE: 113 MMHG | HEIGHT: 69 IN

## 2022-09-01 DIAGNOSIS — S66.911S HAND STRAIN, RIGHT, SEQUELA: Primary | ICD-10-CM

## 2022-09-01 PROCEDURE — 73130 X-RAY EXAM OF HAND: CPT

## 2022-09-01 PROCEDURE — 99283 EMERGENCY DEPT VISIT LOW MDM: CPT
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
## 2022-09-01 NOTE — DISCHARGE INSTRUCTIONS
Thank you! Thank you for allowing me to care for you in the emergency department. It is my goal to provide you with excellent care. If you have not received excellent quality care, please ask to speak to the nurse manager. Please fill out the survey that will come to you by mail or email since we listen to your feedback! Below you will find a list of your tests from today's visit. Should you have any questions, please do not hesitate to call the emergency department. Labs  No results found for this or any previous visit (from the past 12 hour(s)). Radiologic Studies  XR HAND RT MIN 3 V   Final Result   No acute abnormality. CT Results  (Last 48 hours)      None          CXR Results  (Last 48 hours)      None          ------------------------------------------------------------------------------------------------------------  The exam and treatment you received in the Emergency Department were for an urgent problem and are not intended as complete care. It is important that you follow-up with a doctor, nurse practitioner, or physician assistant to:  (1) confirm your diagnosis,  (2) re-evaluation of changes in your illness and treatment, and  (3) for ongoing care. Please take your discharge instructions with you when you go to your follow-up appointment. If you have any problem arranging a follow-up appointment, contact the Emergency Department. If your symptoms become worse or you do not improve as expected and you are unable to reach your health care provider, please return to the Emergency Department. We are available 24 hours a day. If a prescription has been provided, please have it filled as soon as possible to prevent a delay in treatment. If you have any questions or reservations about taking the medication due to side effects or interactions with other medications, please call your primary care provider or contact the ER.

## 2023-03-15 ENCOUNTER — HOSPITAL ENCOUNTER (OUTPATIENT)
Age: 33
Setting detail: OBSERVATION
Discharge: HOME | End: 2023-03-19
Attending: STUDENT IN AN ORGANIZED HEALTH CARE EDUCATION/TRAINING PROGRAM | Admitting: HOSPITALIST
Payer: COMMERCIAL

## 2023-03-15 ENCOUNTER — APPOINTMENT (OUTPATIENT)
Dept: CT IMAGING | Age: 33
End: 2023-03-15
Attending: STUDENT IN AN ORGANIZED HEALTH CARE EDUCATION/TRAINING PROGRAM
Payer: COMMERCIAL

## 2023-03-15 DIAGNOSIS — R55 SYNCOPE, UNSPECIFIED SYNCOPE TYPE: ICD-10-CM

## 2023-03-15 DIAGNOSIS — I63.9 CEREBROVASCULAR ACCIDENT (CVA), UNSPECIFIED MECHANISM (HCC): ICD-10-CM

## 2023-03-15 DIAGNOSIS — G43.819 OTHER MIGRAINE WITHOUT STATUS MIGRAINOSUS, INTRACTABLE: Primary | ICD-10-CM

## 2023-03-15 DIAGNOSIS — R55 SYNCOPE: ICD-10-CM

## 2023-03-15 DIAGNOSIS — R29.898 UPPER EXTREMITY WEAKNESS: ICD-10-CM

## 2023-03-15 LAB
APTT PPP: 27.6 SEC (ref 21.2–34.1)
BASOPHILS # BLD: 0.1 K/UL (ref 0–0.1)
BASOPHILS NFR BLD: 1 % (ref 0–1)
DIFFERENTIAL METHOD BLD: ABNORMAL
EOSINOPHIL # BLD: 0.2 K/UL (ref 0–0.4)
EOSINOPHIL NFR BLD: 2 % (ref 0–7)
ERYTHROCYTE [DISTWIDTH] IN BLOOD BY AUTOMATED COUNT: 13.3 % (ref 11.5–14.5)
GLUCOSE BLD STRIP.AUTO-MCNC: 83 MG/DL (ref 65–100)
HCT VFR BLD AUTO: 35.1 % (ref 35–47)
HGB BLD-MCNC: 11.4 G/DL (ref 11.5–16)
IMM GRANULOCYTES # BLD AUTO: 0 K/UL (ref 0–0.04)
IMM GRANULOCYTES NFR BLD AUTO: 0 % (ref 0–0.5)
INR PPP: 1.1 (ref 0.9–1.1)
LYMPHOCYTES # BLD: 3.9 K/UL (ref 0.8–3.5)
LYMPHOCYTES NFR BLD: 35 % (ref 12–49)
MCH RBC QN AUTO: 27.7 PG (ref 26–34)
MCHC RBC AUTO-ENTMCNC: 32.5 G/DL (ref 30–36.5)
MCV RBC AUTO: 85.2 FL (ref 80–99)
MONOCYTES # BLD: 1.1 K/UL (ref 0–1)
MONOCYTES NFR BLD: 10 % (ref 5–13)
NEUTS SEG # BLD: 6 K/UL (ref 1.8–8)
NEUTS SEG NFR BLD: 52 % (ref 32–75)
NRBC # BLD: 0 K/UL (ref 0–0.01)
NRBC BLD-RTO: 0 PER 100 WBC
PERFORMED BY, TECHID: NORMAL
PLATELET # BLD AUTO: 355 K/UL (ref 150–400)
PMV BLD AUTO: 9.2 FL (ref 8.9–12.9)
PROTHROMBIN TIME: 14.1 SEC (ref 11.9–14.6)
RBC # BLD AUTO: 4.12 M/UL (ref 3.8–5.2)
THERAPEUTIC RANGE,PTTT: NORMAL SEC (ref 82–109)
WBC # BLD AUTO: 11.3 K/UL (ref 3.6–11)

## 2023-03-15 PROCEDURE — 99285 EMERGENCY DEPT VISIT HI MDM: CPT

## 2023-03-15 PROCEDURE — 82962 GLUCOSE BLOOD TEST: CPT

## 2023-03-15 PROCEDURE — 80053 COMPREHEN METABOLIC PANEL: CPT

## 2023-03-15 PROCEDURE — 74011250636 HC RX REV CODE- 250/636

## 2023-03-15 PROCEDURE — 70450 CT HEAD/BRAIN W/O DYE: CPT

## 2023-03-15 PROCEDURE — 36415 COLL VENOUS BLD VENIPUNCTURE: CPT

## 2023-03-15 PROCEDURE — 96374 THER/PROPH/DIAG INJ IV PUSH: CPT

## 2023-03-15 PROCEDURE — 70496 CT ANGIOGRAPHY HEAD: CPT

## 2023-03-15 PROCEDURE — 84484 ASSAY OF TROPONIN QUANT: CPT

## 2023-03-15 PROCEDURE — 93005 ELECTROCARDIOGRAM TRACING: CPT

## 2023-03-15 PROCEDURE — 85730 THROMBOPLASTIN TIME PARTIAL: CPT

## 2023-03-15 PROCEDURE — 85610 PROTHROMBIN TIME: CPT

## 2023-03-15 PROCEDURE — 74011000636 HC RX REV CODE- 636: Performed by: STUDENT IN AN ORGANIZED HEALTH CARE EDUCATION/TRAINING PROGRAM

## 2023-03-15 PROCEDURE — 85025 COMPLETE CBC W/AUTO DIFF WBC: CPT

## 2023-03-15 RX ORDER — NALOXONE HYDROCHLORIDE 1 MG/ML
0.4 INJECTION INTRAMUSCULAR; INTRAVENOUS; SUBCUTANEOUS
Status: DISPENSED | OUTPATIENT
Start: 2023-03-15 | End: 2023-03-16

## 2023-03-15 RX ORDER — NALOXONE HYDROCHLORIDE 0.4 MG/ML
INJECTION, SOLUTION INTRAMUSCULAR; INTRAVENOUS; SUBCUTANEOUS
Status: COMPLETED
Start: 2023-03-15 | End: 2023-03-15

## 2023-03-15 RX ADMIN — NALOXONE HYDROCHLORIDE 0.4 MG: 0.4 INJECTION, SOLUTION INTRAMUSCULAR; INTRAVENOUS; SUBCUTANEOUS at 23:08

## 2023-03-15 RX ADMIN — IOPAMIDOL 100 ML: 755 INJECTION, SOLUTION INTRAVENOUS at 23:28

## 2023-03-16 ENCOUNTER — APPOINTMENT (OUTPATIENT)
Dept: MRI IMAGING | Age: 33
End: 2023-03-16
Attending: HOSPITALIST
Payer: COMMERCIAL

## 2023-03-16 ENCOUNTER — APPOINTMENT (OUTPATIENT)
Dept: NON INVASIVE DIAGNOSTICS | Age: 33
End: 2023-03-16
Attending: PHYSICIAN ASSISTANT
Payer: COMMERCIAL

## 2023-03-16 PROBLEM — R41.82 ALTERED MENTAL STATUS: Status: ACTIVE | Noted: 2023-03-16

## 2023-03-16 PROBLEM — G43.909 MIGRAINE HEADACHE: Status: ACTIVE | Noted: 2023-03-16

## 2023-03-16 LAB
ALBUMIN SERPL-MCNC: 3.3 G/DL (ref 3.5–5)
ALBUMIN/GLOB SERPL: 0.9 (ref 1.1–2.2)
ALP SERPL-CCNC: 36 U/L (ref 45–117)
ALT SERPL-CCNC: 19 U/L (ref 12–78)
AMPHET UR QL SCN: NEGATIVE
ANION GAP SERPL CALC-SCNC: 5 MMOL/L (ref 5–15)
ANION GAP SERPL CALC-SCNC: 5 MMOL/L (ref 5–15)
AST SERPL W P-5'-P-CCNC: 14 U/L (ref 15–37)
ATRIAL RATE: 81 BPM
BARBITURATES UR QL SCN: NEGATIVE
BENZODIAZ UR QL: NEGATIVE
BILIRUB SERPL-MCNC: 0.2 MG/DL (ref 0.2–1)
BUN SERPL-MCNC: 17 MG/DL (ref 6–20)
BUN SERPL-MCNC: 20 MG/DL (ref 6–20)
BUN/CREAT SERPL: 27 (ref 12–20)
BUN/CREAT SERPL: 27 (ref 12–20)
CA-I BLD-MCNC: 8.8 MG/DL (ref 8.5–10.1)
CA-I BLD-MCNC: 9.4 MG/DL (ref 8.5–10.1)
CALCULATED P AXIS, ECG09: 32 DEGREES
CALCULATED R AXIS, ECG10: -3 DEGREES
CALCULATED T AXIS, ECG11: 15 DEGREES
CANNABINOIDS UR QL SCN: POSITIVE
CHLORIDE SERPL-SCNC: 106 MMOL/L (ref 97–108)
CHLORIDE SERPL-SCNC: 108 MMOL/L (ref 97–108)
CHOLEST SERPL-MCNC: 177 MG/DL
CO2 SERPL-SCNC: 25 MMOL/L (ref 21–32)
CO2 SERPL-SCNC: 27 MMOL/L (ref 21–32)
COCAINE UR QL SCN: NEGATIVE
CREAT SERPL-MCNC: 0.64 MG/DL (ref 0.55–1.02)
CREAT SERPL-MCNC: 0.73 MG/DL (ref 0.55–1.02)
DIAGNOSIS, 93000: NORMAL
DRUG SCRN COMMENT,DRGCM: ABNORMAL
EST. AVERAGE GLUCOSE BLD GHB EST-MCNC: 114 MG/DL
GLOBULIN SER CALC-MCNC: 3.6 G/DL (ref 2–4)
GLUCOSE SERPL-MCNC: 83 MG/DL (ref 65–100)
GLUCOSE SERPL-MCNC: 92 MG/DL (ref 65–100)
HBA1C MFR BLD: 5.6 % (ref 4–5.6)
HDLC SERPL-MCNC: 54 MG/DL
HDLC SERPL: 3.3 (ref 0–5)
LDLC SERPL CALC-MCNC: 108.4 MG/DL (ref 0–100)
LIPID PROFILE,FLP: ABNORMAL
METHADONE UR QL: NEGATIVE
OPIATES UR QL: NEGATIVE
P-R INTERVAL, ECG05: 146 MS
PCP UR QL: NEGATIVE
POTASSIUM SERPL-SCNC: 3.4 MMOL/L (ref 3.5–5.1)
POTASSIUM SERPL-SCNC: 4.3 MMOL/L (ref 3.5–5.1)
PROCALCITONIN SERPL-MCNC: <0.05 NG/ML
PROT SERPL-MCNC: 6.9 G/DL (ref 6.4–8.2)
Q-T INTERVAL, ECG07: 374 MS
QRS DURATION, ECG06: 82 MS
QTC CALCULATION (BEZET), ECG08: 434 MS
SODIUM SERPL-SCNC: 138 MMOL/L (ref 136–145)
SODIUM SERPL-SCNC: 138 MMOL/L (ref 136–145)
TRIGL SERPL-MCNC: 73 MG/DL (ref ?–150)
TROPONIN I SERPL HS-MCNC: 4 NG/L (ref 0–51)
TSH SERPL DL<=0.05 MIU/L-ACNC: 5.79 UIU/ML (ref 0.36–3.74)
VENTRICULAR RATE, ECG03: 81 BPM
VLDLC SERPL CALC-MCNC: 14.6 MG/DL

## 2023-03-16 PROCEDURE — G0378 HOSPITAL OBSERVATION PER HR: HCPCS

## 2023-03-16 PROCEDURE — 74011250636 HC RX REV CODE- 250/636: Performed by: HOSPITALIST

## 2023-03-16 PROCEDURE — 83036 HEMOGLOBIN GLYCOSYLATED A1C: CPT

## 2023-03-16 PROCEDURE — 80061 LIPID PANEL: CPT

## 2023-03-16 PROCEDURE — 80048 BASIC METABOLIC PNL TOTAL CA: CPT

## 2023-03-16 PROCEDURE — 96374 THER/PROPH/DIAG INJ IV PUSH: CPT

## 2023-03-16 PROCEDURE — 74011250637 HC RX REV CODE- 250/637: Performed by: HOSPITALIST

## 2023-03-16 PROCEDURE — 96375 TX/PRO/DX INJ NEW DRUG ADDON: CPT

## 2023-03-16 PROCEDURE — 80307 DRUG TEST PRSMV CHEM ANLYZR: CPT

## 2023-03-16 PROCEDURE — 74011250636 HC RX REV CODE- 250/636: Performed by: STUDENT IN AN ORGANIZED HEALTH CARE EDUCATION/TRAINING PROGRAM

## 2023-03-16 PROCEDURE — 87040 BLOOD CULTURE FOR BACTERIA: CPT

## 2023-03-16 PROCEDURE — 36415 COLL VENOUS BLD VENIPUNCTURE: CPT

## 2023-03-16 PROCEDURE — 74011250637 HC RX REV CODE- 250/637: Performed by: PHYSICIAN ASSISTANT

## 2023-03-16 PROCEDURE — 84145 PROCALCITONIN (PCT): CPT

## 2023-03-16 PROCEDURE — 84443 ASSAY THYROID STIM HORMONE: CPT

## 2023-03-16 RX ORDER — ACETAMINOPHEN 325 MG/1
650 TABLET ORAL
Status: DISCONTINUED | OUTPATIENT
Start: 2023-03-16 | End: 2023-03-19 | Stop reason: HOSPADM

## 2023-03-16 RX ORDER — GUAIFENESIN 100 MG/5ML
81 LIQUID (ML) ORAL DAILY
Status: DISCONTINUED | OUTPATIENT
Start: 2023-03-16 | End: 2023-03-19 | Stop reason: HOSPADM

## 2023-03-16 RX ORDER — ONDANSETRON 2 MG/ML
4 INJECTION INTRAMUSCULAR; INTRAVENOUS
Status: DISCONTINUED | OUTPATIENT
Start: 2023-03-16 | End: 2023-03-19 | Stop reason: HOSPADM

## 2023-03-16 RX ORDER — METOCLOPRAMIDE HYDROCHLORIDE 5 MG/ML
10 INJECTION INTRAMUSCULAR; INTRAVENOUS ONCE
Status: COMPLETED | OUTPATIENT
Start: 2023-03-16 | End: 2023-03-16

## 2023-03-16 RX ORDER — KETOROLAC TROMETHAMINE 30 MG/ML
15 INJECTION, SOLUTION INTRAMUSCULAR; INTRAVENOUS
Status: COMPLETED | OUTPATIENT
Start: 2023-03-16 | End: 2023-03-16

## 2023-03-16 RX ORDER — POTASSIUM CHLORIDE 20 MEQ/1
40 TABLET, EXTENDED RELEASE ORAL 2 TIMES DAILY
Status: COMPLETED | OUTPATIENT
Start: 2023-03-16 | End: 2023-03-17

## 2023-03-16 RX ORDER — LANOLIN ALCOHOL/MO/W.PET/CERES
3 CREAM (GRAM) TOPICAL
Status: DISCONTINUED | OUTPATIENT
Start: 2023-03-16 | End: 2023-03-19 | Stop reason: HOSPADM

## 2023-03-16 RX ORDER — HYDRALAZINE HYDROCHLORIDE 20 MG/ML
20 INJECTION INTRAMUSCULAR; INTRAVENOUS
Status: DISCONTINUED | OUTPATIENT
Start: 2023-03-16 | End: 2023-03-16 | Stop reason: SDUPTHER

## 2023-03-16 RX ORDER — KETOROLAC TROMETHAMINE 15 MG/ML
15 INJECTION, SOLUTION INTRAMUSCULAR; INTRAVENOUS
Status: DISCONTINUED | OUTPATIENT
Start: 2023-03-16 | End: 2023-03-19 | Stop reason: HOSPADM

## 2023-03-16 RX ORDER — BUTALBITAL, ACETAMINOPHEN AND CAFFEINE 50; 325; 40 MG/1; MG/1; MG/1
2 TABLET ORAL
Status: DISCONTINUED | OUTPATIENT
Start: 2023-03-16 | End: 2023-03-19 | Stop reason: HOSPADM

## 2023-03-16 RX ORDER — HYDRALAZINE HYDROCHLORIDE 20 MG/ML
20 INJECTION INTRAMUSCULAR; INTRAVENOUS
Status: DISCONTINUED | OUTPATIENT
Start: 2023-03-16 | End: 2023-03-19 | Stop reason: HOSPADM

## 2023-03-16 RX ORDER — ACETAMINOPHEN 650 MG/1
650 SUPPOSITORY RECTAL
Status: DISCONTINUED | OUTPATIENT
Start: 2023-03-16 | End: 2023-03-19 | Stop reason: HOSPADM

## 2023-03-16 RX ADMIN — ASPIRIN 81 MG 81 MG: 81 TABLET ORAL at 09:24

## 2023-03-16 RX ADMIN — METOCLOPRAMIDE 10 MG: 5 INJECTION, SOLUTION INTRAMUSCULAR; INTRAVENOUS at 01:51

## 2023-03-16 RX ADMIN — POTASSIUM CHLORIDE 40 MEQ: 1500 TABLET, EXTENDED RELEASE ORAL at 21:33

## 2023-03-16 RX ADMIN — BUTALBITAL, ACETAMINOPHEN, AND CAFFEINE 2 TABLET: 50; 325; 40 TABLET ORAL at 17:31

## 2023-03-16 RX ADMIN — KETOROLAC TROMETHAMINE 15 MG: 30 INJECTION, SOLUTION INTRAMUSCULAR; INTRAVENOUS at 02:59

## 2023-03-16 RX ADMIN — BUTALBITAL, ACETAMINOPHEN, AND CAFFEINE 2 TABLET: 50; 325; 40 TABLET ORAL at 04:14

## 2023-03-16 RX ADMIN — SODIUM CHLORIDE 1000 ML: 9 INJECTION, SOLUTION INTRAVENOUS at 03:00

## 2023-03-16 NOTE — H&P
's  Hospitalist History & Physical Notes. [facility]. Name : Saba Martin      MRN number : [id number]     YOB: 1990     Subjective :   Chief Complaint : Altered mental status, found on the floor. Complains of headache    Source of information : Patient, but not able to give much information and hardly waking up even though she can    History of present illness:   Saba Martin is morbidly obese 28 y.o. female with a history of asthma is brought to the emergency room as a level 2 stroke alert as family found her on the floor. She was trying to prepare the dinner, not sure exactly what happened but her friend found her on the floor not much response. When brought to the emergency room she was talking but very low and hardly able to hear and very few words. No distress is seen but she is not opening eyes and talking to the people. As time goes patient is a little better but still not much complains of significant headache, denies any vision disturbance. She wanted the lights off for not able to tolerate when  turned on. Work-up in the emergency room was negative, evaluated by neurology through virtual visit, recommended MRI to rule out any underlying neurological issues. Past Medical History:   Diagnosis Date    Asthma      History reviewed. No pertinent surgical history.     Family History   Family history unknown: Yes      Social History     Tobacco Use    Smoking status: Former     Types: Cigarettes     Quit date: 2020     Years since quittin.6    Smokeless tobacco: Never   Substance Use Topics    Alcohol use: Never       No Known Allergies     Current Facility-Administered Medications   Medication Dose Route Frequency Provider Last Rate Last Admin    sodium chloride 0.9 % bolus infusion 1,000 mL  1,000 mL IntraVENous ONCE Curly Abreu MD 1,000 mL/hr at 23 0300 1,000 mL at 23 0300    aspirin chewable tablet 81 mg  81 mg Oral DAILY Benigno Frankel, MD        acetaminophen (TYLENOL) tablet 650 mg  650 mg Oral Q4H PRN Benigno Frankel, MD        Or    acetaminophen (TYLENOL) solution 650 mg  650 mg Per NG tube Q4H PRN Benigno Frankel, MD        Or    acetaminophen (TYLENOL) suppository 650 mg  650 mg Rectal Q4H PRN Benigno Frankel, MD        naloxone (NARCAN) injection 0.4 mg  0.4 mg IntraVENous NOW Wilmar Camara MD         Current Outpatient Medications   Medication Sig Dispense Refill    albuterol (PROVENTIL HFA, VENTOLIN HFA, PROAIR HFA) 90 mcg/actuation inhaler Take 2 Puffs by inhalation every four (4) hours as needed for Wheezing. 90 g 0    aspirin (ASPIRIN) 325 mg tablet Take 1 Tablet by mouth daily. 30 Tablet 0            Review of Systems:  Tried to review the systems but unable to obtain any more information than in history of present illness as she is not much communicating morbidly    Vitals:   Patient Vitals for the past 12 hrs:   Temp Pulse Resp BP SpO2   03/16/23 0302 -- 94 22 126/83 96 %   03/16/23 0210 -- (!) 102 27 134/80 96 %   03/16/23 0125 -- (!) 101 20 (!) 141/98 99 %   03/16/23 0110 -- 72 27 (!) 144/98 100 %   03/16/23 0055 -- 76 29 (!) 138/95 100 %   03/16/23 0010 -- 71 25 (!) 141/99 100 %   03/15/23 2255 98.3 °F (36.8 °C) 84 16 135/86 100 %       Physical Exam:   General : Obese and comfortably sleeping in the bed. HEENT : PERRLA, normal oral mucosa, atraumatic normocephalic, Normal ear and nose. Neck : Supple, no JVD, no masses noted, no carotid bruit. Lungs : Breath sounds with good air entry bilaterally, no wheezes or rales, no accessory muscle use. CVS : Rhythm rate regular, S1+, S2+, no murmur or gallop. Abdomen : Soft, nontender, obese. Bowel sounds active. Extremities : No edema noted,    Musculoskeletal : Fair range of motion, no joint swelling or effusion, muscle tone and power appears normal.   Skin : Moist, warm, .   Lymphatic : No cervical lymphadenopathy. Neurological : Beeping in the bed but able to wake up and open eyes on commands. Data Review:   Recent Results (from the past 24 hour(s))   GLUCOSE, POC    Collection Time: 03/15/23 10:58 PM   Result Value Ref Range    Glucose (POC) 83 65 - 100 mg/dL    Performed by MICHAEL PAULA    CBC WITH AUTOMATED DIFF    Collection Time: 03/15/23 11:14 PM   Result Value Ref Range    WBC 11.3 (H) 3.6 - 11.0 K/uL    RBC 4.12 3.80 - 5.20 M/uL    HGB 11.4 (L) 11.5 - 16.0 g/dL    HCT 35.1 35.0 - 47.0 %    MCV 85.2 80.0 - 99.0 FL    MCH 27.7 26.0 - 34.0 PG    MCHC 32.5 30.0 - 36.5 g/dL    RDW 13.3 11.5 - 14.5 %    PLATELET 385 980 - 946 K/uL    MPV 9.2 8.9 - 12.9 FL    NRBC 0.0 0.0  WBC    ABSOLUTE NRBC 0.00 0.00 - 0.01 K/uL    NEUTROPHILS 52 32 - 75 %    LYMPHOCYTES 35 12 - 49 %    MONOCYTES 10 5 - 13 %    EOSINOPHILS 2 0 - 7 %    BASOPHILS 1 0 - 1 %    IMMATURE GRANULOCYTES 0 0 - 0.5 %    ABS. NEUTROPHILS 6.0 1.8 - 8.0 K/UL    ABS. LYMPHOCYTES 3.9 (H) 0.8 - 3.5 K/UL    ABS. MONOCYTES 1.1 (H) 0.0 - 1.0 K/UL    ABS. EOSINOPHILS 0.2 0.0 - 0.4 K/UL    ABS. BASOPHILS 0.1 0.0 - 0.1 K/UL    ABS. IMM. GRANS. 0.0 0.00 - 0.04 K/UL    DF AUTOMATED     METABOLIC PANEL, COMPREHENSIVE    Collection Time: 03/15/23 11:14 PM   Result Value Ref Range    Sodium 138 136 - 145 mmol/L    Potassium 4.3 3.5 - 5.1 mmol/L    Chloride 106 97 - 108 mmol/L    CO2 27 21 - 32 mmol/L    Anion gap 5 5 - 15 mmol/L    Glucose 83 65 - 100 mg/dL    BUN 20 6 - 20 mg/dL    Creatinine 0.73 0.55 - 1.02 mg/dL    BUN/Creatinine ratio 27 (H) 12 - 20      eGFR >60 >60 ml/min/1.73m2    Calcium 9.4 8.5 - 10.1 mg/dL    Bilirubin, total 0.2 0.2 - 1.0 mg/dL    AST (SGOT) 14 (L) 15 - 37 U/L    ALT (SGPT) 19 12 - 78 U/L    Alk.  phosphatase 36 (L) 45 - 117 U/L    Protein, total 6.9 6.4 - 8.2 g/dL    Albumin 3.3 (L) 3.5 - 5.0 g/dL    Globulin 3.6 2.0 - 4.0 g/dL    A-G Ratio 0.9 (L) 1.1 - 2.2     PROTHROMBIN TIME + INR    Collection Time: 03/15/23 11:14 PM   Result Value Ref Range    Prothrombin time 14.1 11.9 - 14.6 sec    INR 1.1 0.9 - 1.1     PTT    Collection Time: 03/15/23 11:14 PM   Result Value Ref Range    aPTT 27.6 21.2 - 34.1 sec    aPTT, therapeutic range   82 - 109 sec   TROPONIN-HIGH SENSITIVITY    Collection Time: 03/15/23 11:14 PM   Result Value Ref Range    Troponin-High Sensitivity 4 0 - 51 ng/L       Radiologic Studies :   CT Results  (Last 48 hours)                 03/15/23 2328  CTA CODE NEURO HEAD AND NECK W CONT Preliminary result      This result has not been signed. Information might be incomplete. Narrative:  PRELIMINARY REPORT       No large vessel occlusion       Preliminary report was provided by Dr. Mary Henry, the on-call radiologist, at   00:16       Final report to follow. END PRELIMINARY REPORT           03/15/23 2327  CT CODE NEURO HEAD WO CONTRAST Final result    Impression:  No acute intracranial hemorrhage, mass or infarct. Narrative:  EXAM: Brain CT without contrast.       INDICATION: stroke        TECHNIQUE: Noncontrast CT of the brain is performed with 5 mm collimation. Bone   algorithm axial and sagittal and coronal reconstructions performed and   evaluated. CT dose reduction was achieved through use of a standardized   protocol tailored for this examination and automatic exposure control for dose   modulation. COMPARISON: CT 8/3/2022 and MRI 10/6/2021. FINDINGS: There is no acute intracranial hemorrhage, mass, mass effect or   herniation. Ventricular system is normal. The gray-white matter differentiation   is well-preserved. The mastoid air cells are well pneumatized. The visualized   paranasal sinuses are normal.                         Assessment and Plan : Altered mental status: Doubt CVA. We will get an MRI to rule out as per Neurology recommendations. CT head and CTA negative.     Suspected migraine headaches: She was given a dose of Reglan by the emergency room physician, stated it did not help. We gave dose of Toradol 15 mg, after that she was able to wake up but got out of the bed and she walked by herself to the bathroom. May benefit evaluation for migraine headaches. Morbid obesity    History of asthma no evidence of exacerbation    Admitted for observation to medical telemetry, full CODE STATUS, home medications none at this time. CC : Collin Granda NP  Signed By: Mirela Robertson MD     March 16, 2023      This dictation was done by dragon, computer voice recognition software. Often unanticipated grammatical, syntax, [place] phones and other interpretive errors are inadvertently transcribed. Please excuse errors that have escaped final proofreading.

## 2023-03-16 NOTE — PROGRESS NOTES
Hospitalist Progress Note    Subjective:   Daily Progress Note: 3/16/2023 11:54 AM    Chief Complaint   Patient presents with    Altered mental status    Extremity Weakness        Remains with a headache 4 out of 10 although right-sided arm weakness has resolved. Current Facility-Administered Medications   Medication Dose Route Frequency    aspirin chewable tablet 81 mg  81 mg Oral DAILY    acetaminophen (TYLENOL) tablet 650 mg  650 mg Oral Q4H PRN    Or    acetaminophen (TYLENOL) solution 650 mg  650 mg Per NG tube Q4H PRN    Or    acetaminophen (TYLENOL) suppository 650 mg  650 mg Rectal Q4H PRN    butalbital-acetaminophen-caffeine (FIORICET, ESGIC) -40 mg per tablet 2 Tablet  2 Tablet Oral Q6H PRN     Current Outpatient Medications   Medication Sig    albuterol (PROVENTIL HFA, VENTOLIN HFA, PROAIR HFA) 90 mcg/actuation inhaler Take 2 Puffs by inhalation every four (4) hours as needed for Wheezing. Review of Systems  Review of Systems   Constitutional:  Positive for malaise/fatigue. HENT: Negative. Respiratory:  Negative for cough and shortness of breath. Cardiovascular:  Negative for chest pain and leg swelling. Gastrointestinal:  Negative for abdominal pain, nausea and vomiting. Genitourinary: Negative. Musculoskeletal: Negative. Skin: Negative. Neurological:  Positive for headaches. Psychiatric/Behavioral: Negative. Objective:     Visit Vitals  BP (!) 126/95   Pulse 62   Temp 98.3 °F (36.8 °C)   Resp 14   SpO2 99%      O2 Device: None (Room air)    Temp (24hrs), Av.3 °F (36.8 °C), Min:98.3 °F (36.8 °C), Max:98.3 °F (36.8 °C)      No intake/output data recorded. No intake/output data recorded.     Recent Results (from the past 24 hour(s))   GLUCOSE, POC    Collection Time: 03/15/23 10:58 PM   Result Value Ref Range    Glucose (POC) 83 65 - 100 mg/dL    Performed by MICHAEL PAULA    CBC WITH AUTOMATED DIFF    Collection Time: 03/15/23 11:14 PM   Result Value Ref Range    WBC 11.3 (H) 3.6 - 11.0 K/uL    RBC 4.12 3.80 - 5.20 M/uL    HGB 11.4 (L) 11.5 - 16.0 g/dL    HCT 35.1 35.0 - 47.0 %    MCV 85.2 80.0 - 99.0 FL    MCH 27.7 26.0 - 34.0 PG    MCHC 32.5 30.0 - 36.5 g/dL    RDW 13.3 11.5 - 14.5 %    PLATELET 688 718 - 440 K/uL    MPV 9.2 8.9 - 12.9 FL    NRBC 0.0 0.0  WBC    ABSOLUTE NRBC 0.00 0.00 - 0.01 K/uL    NEUTROPHILS 52 32 - 75 %    LYMPHOCYTES 35 12 - 49 %    MONOCYTES 10 5 - 13 %    EOSINOPHILS 2 0 - 7 %    BASOPHILS 1 0 - 1 %    IMMATURE GRANULOCYTES 0 0 - 0.5 %    ABS. NEUTROPHILS 6.0 1.8 - 8.0 K/UL    ABS. LYMPHOCYTES 3.9 (H) 0.8 - 3.5 K/UL    ABS. MONOCYTES 1.1 (H) 0.0 - 1.0 K/UL    ABS. EOSINOPHILS 0.2 0.0 - 0.4 K/UL    ABS. BASOPHILS 0.1 0.0 - 0.1 K/UL    ABS. IMM. GRANS. 0.0 0.00 - 0.04 K/UL    DF AUTOMATED     METABOLIC PANEL, COMPREHENSIVE    Collection Time: 03/15/23 11:14 PM   Result Value Ref Range    Sodium 138 136 - 145 mmol/L    Potassium 4.3 3.5 - 5.1 mmol/L    Chloride 106 97 - 108 mmol/L    CO2 27 21 - 32 mmol/L    Anion gap 5 5 - 15 mmol/L    Glucose 83 65 - 100 mg/dL    BUN 20 6 - 20 mg/dL    Creatinine 0.73 0.55 - 1.02 mg/dL    BUN/Creatinine ratio 27 (H) 12 - 20      eGFR >60 >60 ml/min/1.73m2    Calcium 9.4 8.5 - 10.1 mg/dL    Bilirubin, total 0.2 0.2 - 1.0 mg/dL    AST (SGOT) 14 (L) 15 - 37 U/L    ALT (SGPT) 19 12 - 78 U/L    Alk.  phosphatase 36 (L) 45 - 117 U/L    Protein, total 6.9 6.4 - 8.2 g/dL    Albumin 3.3 (L) 3.5 - 5.0 g/dL    Globulin 3.6 2.0 - 4.0 g/dL    A-G Ratio 0.9 (L) 1.1 - 2.2     PROTHROMBIN TIME + INR    Collection Time: 03/15/23 11:14 PM   Result Value Ref Range    Prothrombin time 14.1 11.9 - 14.6 sec    INR 1.1 0.9 - 1.1     PTT    Collection Time: 03/15/23 11:14 PM   Result Value Ref Range    aPTT 27.6 21.2 - 34.1 sec    aPTT, therapeutic range   82 - 109 sec   TROPONIN-HIGH SENSITIVITY    Collection Time: 03/15/23 11:14 PM   Result Value Ref Range    Troponin-High Sensitivity 4 0 - 51 ng/L   DRUG SCREEN, URINE    Collection Time: 03/16/23  3:55 AM   Result Value Ref Range    AMPHETAMINES Negative Negative      BARBITURATES Negative Negative      BENZODIAZEPINES Negative Negative      COCAINE Negative Negative      METHADONE Negative Negative      OPIATES Negative Negative      PCP(PHENCYCLIDINE) Negative Negative      THC (TH-CANNABINOL) Positive (A) Negative      Drug screen comment        This test is a screen for drugs of abuse in a medical setting only (i.e., they are unconfirmed results and as such must not be used for non-medical purposes, e.g.,employment testing, legal testing). Due to its inherent nature, false positive (FP) and false negative (FN) results may be obtained. Therefore, if necessary for medical care, recommend confirmation of positive findings by GC/MS. TSH 3RD GENERATION    Collection Time: 03/16/23  3:55 AM   Result Value Ref Range    TSH 5.79 (H) 0.36 - 4.73 uIU/mL   METABOLIC PANEL, BASIC    Collection Time: 03/16/23  3:55 AM   Result Value Ref Range    Sodium 138 136 - 145 mmol/L    Potassium 3.4 (L) 3.5 - 5.1 mmol/L    Chloride 108 97 - 108 mmol/L    CO2 25 21 - 32 mmol/L    Anion gap 5 5 - 15 mmol/L    Glucose 92 65 - 100 mg/dL    BUN 17 6 - 20 mg/dL    Creatinine 0.64 0.55 - 1.02 mg/dL    BUN/Creatinine ratio 27 (H) 12 - 20      eGFR >60 >60 ml/min/1.73m2    Calcium 8.8 8.5 - 10.1 mg/dL        CTA CODE NEURO HEAD AND NECK W CONT   Final Result      1. No acute process. No large vessel occlusion, arterial dissection, or   flow-limiting stenosis. 2. CT perfusion not performed. If high clinical concern for acute process,   consider MRI (unless contraindicated). CT CODE NEURO HEAD WO CONTRAST   Final Result   No acute intracranial hemorrhage, mass or infarct. PHYSICAL EXAM:    Physical Exam  Vitals reviewed. HENT:      Head: Normocephalic and atraumatic. Eyes:      Extraocular Movements: Extraocular movements intact.       Pupils: Pupils are equal, round, and reactive to light. Cardiovascular:      Rate and Rhythm: Normal rate and regular rhythm. Heart sounds: Normal heart sounds. Pulmonary:      Effort: Pulmonary effort is normal.      Breath sounds: Normal breath sounds. Abdominal:      General: Abdomen is flat. Bowel sounds are normal.      Palpations: Abdomen is soft. Musculoskeletal:         General: Normal range of motion. Cervical back: Normal range of motion and neck supple. Skin:     General: Skin is warm and dry. Neurological:      General: No focal deficit present. Mental Status: She is alert and oriented to person, place, and time. Psychiatric:         Mood and Affect: Mood normal.        Data Review    Recent Results (from the past 24 hour(s))   GLUCOSE, POC    Collection Time: 03/15/23 10:58 PM   Result Value Ref Range    Glucose (POC) 83 65 - 100 mg/dL    Performed by MICHAEL PAULA    CBC WITH AUTOMATED DIFF    Collection Time: 03/15/23 11:14 PM   Result Value Ref Range    WBC 11.3 (H) 3.6 - 11.0 K/uL    RBC 4.12 3.80 - 5.20 M/uL    HGB 11.4 (L) 11.5 - 16.0 g/dL    HCT 35.1 35.0 - 47.0 %    MCV 85.2 80.0 - 99.0 FL    MCH 27.7 26.0 - 34.0 PG    MCHC 32.5 30.0 - 36.5 g/dL    RDW 13.3 11.5 - 14.5 %    PLATELET 953 065 - 125 K/uL    MPV 9.2 8.9 - 12.9 FL    NRBC 0.0 0.0  WBC    ABSOLUTE NRBC 0.00 0.00 - 0.01 K/uL    NEUTROPHILS 52 32 - 75 %    LYMPHOCYTES 35 12 - 49 %    MONOCYTES 10 5 - 13 %    EOSINOPHILS 2 0 - 7 %    BASOPHILS 1 0 - 1 %    IMMATURE GRANULOCYTES 0 0 - 0.5 %    ABS. NEUTROPHILS 6.0 1.8 - 8.0 K/UL    ABS. LYMPHOCYTES 3.9 (H) 0.8 - 3.5 K/UL    ABS. MONOCYTES 1.1 (H) 0.0 - 1.0 K/UL    ABS. EOSINOPHILS 0.2 0.0 - 0.4 K/UL    ABS. BASOPHILS 0.1 0.0 - 0.1 K/UL    ABS. IMM.  GRANS. 0.0 0.00 - 0.04 K/UL    DF AUTOMATED     METABOLIC PANEL, COMPREHENSIVE    Collection Time: 03/15/23 11:14 PM   Result Value Ref Range    Sodium 138 136 - 145 mmol/L    Potassium 4.3 3.5 - 5.1 mmol/L    Chloride 106 97 - 108 mmol/L CO2 27 21 - 32 mmol/L    Anion gap 5 5 - 15 mmol/L    Glucose 83 65 - 100 mg/dL    BUN 20 6 - 20 mg/dL    Creatinine 0.73 0.55 - 1.02 mg/dL    BUN/Creatinine ratio 27 (H) 12 - 20      eGFR >60 >60 ml/min/1.73m2    Calcium 9.4 8.5 - 10.1 mg/dL    Bilirubin, total 0.2 0.2 - 1.0 mg/dL    AST (SGOT) 14 (L) 15 - 37 U/L    ALT (SGPT) 19 12 - 78 U/L    Alk. phosphatase 36 (L) 45 - 117 U/L    Protein, total 6.9 6.4 - 8.2 g/dL    Albumin 3.3 (L) 3.5 - 5.0 g/dL    Globulin 3.6 2.0 - 4.0 g/dL    A-G Ratio 0.9 (L) 1.1 - 2.2     PROTHROMBIN TIME + INR    Collection Time: 03/15/23 11:14 PM   Result Value Ref Range    Prothrombin time 14.1 11.9 - 14.6 sec    INR 1.1 0.9 - 1.1     PTT    Collection Time: 03/15/23 11:14 PM   Result Value Ref Range    aPTT 27.6 21.2 - 34.1 sec    aPTT, therapeutic range   82 - 109 sec   TROPONIN-HIGH SENSITIVITY    Collection Time: 03/15/23 11:14 PM   Result Value Ref Range    Troponin-High Sensitivity 4 0 - 51 ng/L   DRUG SCREEN, URINE    Collection Time: 03/16/23  3:55 AM   Result Value Ref Range    AMPHETAMINES Negative Negative      BARBITURATES Negative Negative      BENZODIAZEPINES Negative Negative      COCAINE Negative Negative      METHADONE Negative Negative      OPIATES Negative Negative      PCP(PHENCYCLIDINE) Negative Negative      THC (TH-CANNABINOL) Positive (A) Negative      Drug screen comment        This test is a screen for drugs of abuse in a medical setting only (i.e., they are unconfirmed results and as such must not be used for non-medical purposes, e.g.,employment testing, legal testing). Due to its inherent nature, false positive (FP) and false negative (FN) results may be obtained. Therefore, if necessary for medical care, recommend confirmation of positive findings by GC/MS.      TSH 3RD GENERATION    Collection Time: 03/16/23  3:55 AM   Result Value Ref Range    TSH 5.79 (H) 0.36 - 5.67 uIU/mL   METABOLIC PANEL, BASIC    Collection Time: 03/16/23  3:55 AM   Result Value Ref Range    Sodium 138 136 - 145 mmol/L    Potassium 3.4 (L) 3.5 - 5.1 mmol/L    Chloride 108 97 - 108 mmol/L    CO2 25 21 - 32 mmol/L    Anion gap 5 5 - 15 mmol/L    Glucose 92 65 - 100 mg/dL    BUN 17 6 - 20 mg/dL    Creatinine 0.64 0.55 - 1.02 mg/dL    BUN/Creatinine ratio 27 (H) 12 - 20      eGFR >60 >60 ml/min/1.73m2    Calcium 8.8 8.5 - 10.1 mg/dL        Assessment/Plan:     Active Problems:    Altered mental status (3/16/2023)      Migraine headache (3/16/2023)      Hospital course:    27-year-old female admitted on 3/15/2023 with a remote history of complex headaches and migraines who presents with a severe headache and right arm weakness. Right arm weakness has improved although patient states that she has had a previous episode with weakness when her blood pressure was high. Patient's white count remains elevated for unclear etiology. MRI could not be performed due to the patient's body habitus. She would require a transfer to another facility for the MRI. CT of the head and CTA of the head and neck with no obvious flow-limiting lesions or acute process. Due to the elevated white count will start acyclovir for herpes simplex encephalitis. And order a infectious work-up. Continue to work with the patient on improvement of her headache    Impression\plan:    1. Complex headache versus CVA versus infectious encephalitis  MRI not able to be performed due to body habitus  Nuclear medicine bone brain scan  Consider transfer for MRI  LP with interventional radiology due to body habitus  Cultures ordered  Start acyclovir as a precaution  Elevated white count of 11,000  Continue Fioricet  Add Toradol  Hold anticoagulation  Hold aspirin  Stroke work-up  Right arm weakness resolved  Neurology consultation  CTA of the head and neck without flow-limiting lesions  CT of the head without acute process  Echocardiogram  Hemoglobin A1c  Lipid panel    2.   Essential hypertension  Hydralazine as needed    CODE STATUS: Full code    DVT prophylaxis: SCDs  Ulcer prophylaxis: None    Discharge disposition: Await MRI or nuclear medicine brain scan, neurology consultation and resolution of complex headache      Medical Decision Making:    Labs reviewed by myself   White count 11.3    Diagnostic data reviewed by myself   CT and CTA of the head and neck with no acute process    Toxic drug monitoring    None    Discussed case with   Neurology    MDM Discussion   27-year-old female with a remote history of complex headaches who presents with right arm weakness and elevated white blood cell count of unclear etiology. Acyclovir ordered prophylactically. Nuchal medicine brain scan pending. Neurology consultation and LP pending. Patient will need to transfer for a MRI pending neurology evaluation      Care Plan discussed with: Patient/Family    Total time spent with patient: 40 minutes.

## 2023-03-16 NOTE — PROGRESS NOTES
Reason for Admission:  Migraine                     RUR Score:   N/A                  Plan for utilizing home health:  Not at this time        PCP: First and Last name:  Farhat Chan NP     Name of Practice:    Are you a current patient: Yes/No:    Approximate date of last visit: 2 months ago   Can you participate in a virtual visit with your PCP:                     Current Advanced Directive/Advance Care Plan: Full Code      Healthcare Decision Maker:   Click here to complete [address] including selection of the [address] Relationship (ie \"Primary\")   Jesse Cortes, mother, [phone]                          Transition of Care Plan:      Met f/f with Pt, she confirmed that the information on the face sheet is correct. Pt stated that she lives with her mother and her son. No HH, no DME and independent with ADL. Send RX to [facility]. Family will transport home when D/C.     CM dispo: home with no needs

## 2023-03-16 NOTE — PROGRESS NOTES
SON)/ [place] [facility] Observation Notice (Amilcar Gonzalez) provided to patient/representative with verbal explanation of the notice. Time allotted for questions regarding the notice. Patient /representative provided a completed copy of the SON/VOON notice. Copy placed on bedside chart.

## 2023-03-16 NOTE — ED PROVIDER NOTES
[phone]  EMERGENCY DEPARTMENT ENCOUNTER NOTE        Date: 3/15/2023  Patient Name: Lida Molina      History of Presenting Illness     Chief Complaint   Patient presents with    Altered mental status    Extremity Weakness       History Provided By: Patient    HPI: Lida Molina, 28 y.o. female with MH of asthma who comes to the ED with altered mental status. Patient arrives with family who reports that patient was found on the floor unresponsive. She has been talking very slow and walking very slow. She has been complaining that her left arm is weak for me. She reports that she had preceding headache prior to all with this. This is similar in pattern to before. She does have history of chronic recurrent headache. Unknown last known normal per patient and family. She does not recall all the details. The current symptoms which has been having as headache, chest pain weakness of the left arm. Denies any recent illnesses, vomiting, changes in bowel, dietary or habits. Stroke alert was activated from triage.       PCP: Farhat Chan NP    Current Facility-Administered Medications   Medication Dose Route Frequency Provider Last Rate Last Admin    sodium chloride 0.9 % bolus infusion 1,000 mL  1,000 mL IntraVENous ONCE Wilian Morillo MD 1,000 mL/hr at 03/16/23 0300 1,000 mL at 03/16/23 0300    aspirin chewable tablet 81 mg  81 mg Oral DAILY Wilian Morillo MD        acetaminophen (TYLENOL) tablet 650 mg  650 mg Oral Q4H PRN Wilian Morillo MD        Or    acetaminophen (TYLENOL) solution 650 mg  650 mg Per NG tube Q4H PRN Wilian Morillo MD        Or    acetaminophen (TYLENOL) suppository 650 mg  650 mg Rectal Q4H PRN Wilian Morillo MD        butalbital-acetaminophen-caffeine (FIORICET, ESGIC) -40 mg per tablet 2 Tablet  2 Tablet Oral Q6H PRN Wilian Morillo MD        naloxone [facility]) injection 0.4 mg  0.4 mg IntraVENous NOW Wilmar Camara MD         Current Outpatient Medications   Medication Sig Dispense Refill    albuterol (PROVENTIL HFA, VENTOLIN HFA, PROAIR HFA) 90 mcg/actuation inhaler Take 2 Puffs by inhalation every four (4) hours as needed for Wheezing. 90 g 0    aspirin (ASPIRIN) 325 mg tablet Take 1 Tablet by mouth daily. 30 Tablet 0       Past History     Past Medical History:  Past Medical History:   Diagnosis Date    Asthma        Past Surgical History:  History reviewed. No pertinent surgical history. Family History:  Family History   Family history unknown: Yes       Social History:  Social History     Tobacco Use    Smoking status: Former     Types: Cigarettes     Quit date: 2020     Years since quittin.6    Smokeless tobacco: Never   Substance Use Topics    Alcohol use: Never    Drug use: Yes     Types: Marijuana       Allergies:  No Known Allergies      Review of Systems     Review of Systems    A 10 point review of system was performed and was negative except as noted above in HPI    Physical Exam     Physical Exam  Vitals and nursing note reviewed. Constitutional:       General: She is not in acute distress. Appearance: She is well-developed. She is obese. She is not ill-appearing, toxic-appearing or diaphoretic. HENT:      Head: Normocephalic and atraumatic. Cardiovascular:      Rate and Rhythm: Normal rate and regular rhythm. Pulmonary:      Effort: Pulmonary effort is normal.      Breath sounds: Normal breath sounds. Abdominal:      Palpations: Abdomen is soft. Tenderness: There is no abdominal tenderness. Musculoskeletal:      Cervical back: Normal range of motion and neck supple. Neurological:      Comments: Patient is somnolent yet easily arousable and following commands. Alert and oriented x3. Cranial nerves II through XII grossly intact  Sensation is intact sensation is impaired in the left upper extremity. Motor is limited by patient cooperation.   With prompting, no focal deficits were appreciated in all 4 extremities. Lab and Diagnostic Study Results     Labs -     Recent Results (from the past 12 hour(s))   GLUCOSE, POC    Collection Time: 03/15/23 10:58 PM   Result Value Ref Range    Glucose (POC) 83 65 - 100 mg/dL    Performed by MICHAEL PAULA    CBC WITH AUTOMATED DIFF    Collection Time: 03/15/23 11:14 PM   Result Value Ref Range    WBC 11.3 (H) 3.6 - 11.0 K/uL    RBC 4.12 3.80 - 5.20 M/uL    HGB 11.4 (L) 11.5 - 16.0 g/dL    HCT 35.1 35.0 - 47.0 %    MCV 85.2 80.0 - 99.0 FL    MCH 27.7 26.0 - 34.0 PG    MCHC 32.5 30.0 - 36.5 g/dL    RDW 13.3 11.5 - 14.5 %    PLATELET 115 556 - 188 K/uL    MPV 9.2 8.9 - 12.9 FL    NRBC 0.0 0.0  WBC    ABSOLUTE NRBC 0.00 0.00 - 0.01 K/uL    NEUTROPHILS 52 32 - 75 %    LYMPHOCYTES 35 12 - 49 %    MONOCYTES 10 5 - 13 %    EOSINOPHILS 2 0 - 7 %    BASOPHILS 1 0 - 1 %    IMMATURE GRANULOCYTES 0 0 - 0.5 %    ABS. NEUTROPHILS 6.0 1.8 - 8.0 K/UL    ABS. LYMPHOCYTES 3.9 (H) 0.8 - 3.5 K/UL    ABS. MONOCYTES 1.1 (H) 0.0 - 1.0 K/UL    ABS. EOSINOPHILS 0.2 0.0 - 0.4 K/UL    ABS. BASOPHILS 0.1 0.0 - 0.1 K/UL    ABS. IMM. GRANS. 0.0 0.00 - 0.04 K/UL    DF AUTOMATED     METABOLIC PANEL, COMPREHENSIVE    Collection Time: 03/15/23 11:14 PM   Result Value Ref Range    Sodium 138 136 - 145 mmol/L    Potassium 4.3 3.5 - 5.1 mmol/L    Chloride 106 97 - 108 mmol/L    CO2 27 21 - 32 mmol/L    Anion gap 5 5 - 15 mmol/L    Glucose 83 65 - 100 mg/dL    BUN 20 6 - 20 mg/dL    Creatinine 0.73 0.55 - 1.02 mg/dL    BUN/Creatinine ratio 27 (H) 12 - 20      eGFR >60 >60 ml/min/1.73m2    Calcium 9.4 8.5 - 10.1 mg/dL    Bilirubin, total 0.2 0.2 - 1.0 mg/dL    AST (SGOT) 14 (L) 15 - 37 U/L    ALT (SGPT) 19 12 - 78 U/L    Alk.  phosphatase 36 (L) 45 - 117 U/L    Protein, total 6.9 6.4 - 8.2 g/dL    Albumin 3.3 (L) 3.5 - 5.0 g/dL    Globulin 3.6 2.0 - 4.0 g/dL    A-G Ratio 0.9 (L) 1.1 - 2.2     PROTHROMBIN TIME + INR    Collection Time: 03/15/23 11:14 PM   Result Value Ref Range    Prothrombin time 14.1 11.9 - 14.6 sec    INR 1.1 0.9 - 1.1     PTT    Collection Time: 03/15/23 11:14 PM   Result Value Ref Range    aPTT 27.6 21.2 - 34.1 sec    aPTT, therapeutic range   82 - 109 sec   TROPONIN-HIGH SENSITIVITY    Collection Time: 03/15/23 11:14 PM   Result Value Ref Range    Troponin-High Sensitivity 4 0 - 51 ng/L       Radiologic Studies -   [unfilled]  CT Results  (Last 48 hours)                 03/15/23 2328  CTA CODE NEURO HEAD AND NECK W CONT Preliminary result      This result has not been signed. Information might be incomplete. Narrative:  *PRELIMINARY REPORT*       No large vessel occlusion       Preliminary report was provided by Dr. Sharita Bolanos, the on-call radiologist, at   00:16       Final report to follow. *END PRELIMINARY REPORT*           03/15/23 2327  CT CODE NEURO HEAD WO CONTRAST Final result    Impression:  No acute intracranial hemorrhage, mass or infarct. Narrative:  EXAM: Brain CT without contrast.       INDICATION: stroke        TECHNIQUE: Noncontrast CT of the brain is performed with 5 mm collimation. Bone   algorithm axial and sagittal and coronal reconstructions performed and   evaluated. CT dose reduction was achieved through use of a standardized   protocol tailored for this examination and automatic exposure control for dose   modulation. COMPARISON: CT 8/3/2022 and MRI 10/6/2021. FINDINGS: There is no acute intracranial hemorrhage, mass, mass effect or   herniation. Ventricular system is normal. The gray-white matter differentiation   is well-preserved. The mastoid air cells are well pneumatized. The visualized   paranasal sinuses are normal.                 CXR Results  (Last 48 hours)      None            Medical Decision Making and ED Course   - I am the first and primary provider for this patient AND AM THE PRIMARY PROVIDER OF RECORD.     - I reviewed the vital signs, available nursing notes, past medical history, past surgical history, family history and social history. - Initial assessment performed. The patients presenting problems have been discussed, and the staff are in agreement with the care plan formulated and outlined with them. I have encouraged them to ask questions as they arise throughout their visit. Vital Signs-Reviewed the patient's vital signs. Patient Vitals for the past 24 hrs:   Temp Pulse Resp BP SpO2   03/16/23 0302 -- 94 22 126/83 96 %   03/16/23 0210 -- (!) 102 27 134/80 96 %   03/16/23 0125 -- (!) 101 20 (!) 141/98 99 %   03/16/23 0110 -- 72 27 (!) 144/98 100 %   03/16/23 0055 -- 76 29 (!) 138/95 100 %   03/16/23 0010 -- 71 25 (!) 141/99 100 %   03/15/23 2255 98.3 °F (36.8 °C) 84 16 135/86 100 %       Records Reviewed: Prior medical records and Nursing notes      Medical Decision Making       Patient is 27-year-old female with past medical history of asthma, obesity, and migraines who comes to the ED with weakness of the left upper extremity. She reports that she had headache earlier today preceding all of this and was found at home by family. Her presentation Today could be secondary to complex migraine. There is some positional component with motor exam cooperation. However, patient reported some subjective sensory deficits and thus stroke cannot be ruled out. Patient does have history of psychiatric disorder and had similar episode in the past in which she was diagnosed with conversion disorder and thus this could be also secondary to conversion disorder. She denies any recent illnesses and trauma. Highly unlikely to be secondary to an acute intracranial bleed. Plan will get stroke work-up with imaging of her head with dry CT and CTA head and neck. Also, will get teleneurology to engage in work-up and management of this patient.       ED Course & Reassessment     Medications ordered:  Medications   naloxone (NARCAN) injection 0.4 mg (0 mg IntraVENous Held 3/15/23 2302)   sodium chloride 0.9 % bolus infusion 1,000 mL (1,000 mL IntraVENous New Bag 3/16/23 0300)   aspirin chewable tablet 81 mg (has no administration in time range)   acetaminophen (TYLENOL) tablet 650 mg (has no administration in time range)     Or   acetaminophen (TYLENOL) solution 650 mg (has no administration in time range)     Or   acetaminophen (TYLENOL) suppository 650 mg (has no administration in time range)   butalbital-acetaminophen-caffeine (FIORICET, ESGIC) -40 mg per tablet 2 Tablet (has no administration in time range)   naloxone (NARCAN) 0.4 mg/mL injection (0.4 mg  Given 3/15/23 2308)   iopamidoL (ISOVUE-370) 370 mg iodine /mL (76 %) injection 100 mL (100 mL IntraVENous Given 3/15/23 2328)   metoclopramide HCl (REGLAN) injection 10 mg (10 mg IntraVENous Given 3/16/23 0151)   ketorolac (TORADOL) injection 15 mg (15 mg IntraVENous Given 3/16/23 0259)       ED Course:     I spoke to the [facility] telemetry neurologist on-call. Concerns for conversion disorder rather than stroke. I treated the patient symptoms with Reglan. Unfortunately, her headache has improved little bit but continues to have subjective symptoms. In the setting of unknown last known normal, patient is clearly not a candidate for tPA. Also, CTA is negative for LVO. Thus, in the setting of persistent symptoms we will admit the patient for further work-up for possible CVA versus intractable migraine. Plan of care was discussed with the hospitalist Dr. Miriam Arnold will come evaluate the patient for admission. Diagnosis     Clinical Impression:   1. Other migraine without status migrainosus, intractable    2. Upper extremity weakness        Disposition     Disposition: Condition stable    Admitted        Attestations: Talyor Grijalva MD    Please note that this dictation was completed with [employer], the [employer] voice recognition software.   Quite often unanticipated grammatical, syntax, homophones, and other interpretive errors are inadvertently transcribed by the computer software. Please disregard these errors. Please excuse any errors that have escaped final proofreading. Thank you.

## 2023-03-16 NOTE — ED NOTES
Pt ambulated to bathroom with writer. Pt ambulated slowly with steady gait. Light is bothering patient.

## 2023-03-16 NOTE — ED NOTES
Spoke with JOHN Jacome. Updated him that pt is unable to fit in MRI scanner here and that [facility] has one large enough for her to fit in. He states that he will evaluate pt and then decide if MRI can be done as outpatient.

## 2023-03-16 NOTE — ED NOTES
Writer called SOC to fax consults to [facility]. SOC stated to give the fax 15-20 minutes and if there is no fax received to call back with a different fax number.

## 2023-03-16 NOTE — PROGRESS NOTES
Admission Medication Reconciliation:    Information obtained from:  Patient    Comments/Recommendations: Reviewed PTA medications and patient's allergies. Removed aspirin 325 mg     Pt also states she uses a flovent inhaler every day prn but there are no recent fills for flovent at her local pharmacy    Pharmacy: [facility]       Allergies:  Patient has no known allergies. Significant PMH/Disease States:   Past Medical History:   Diagnosis Date    Asthma      Chief Complaint for this Admission:    Chief Complaint   Patient presents with    Altered mental status    Extremity Weakness     Prior to Admission Medications:   Prior to Admission Medications   Prescriptions Last Dose Informant Patient Reported? Taking? albuterol (PROVENTIL HFA, VENTOLIN HFA, PROAIR HFA) 90 mcg/actuation inhaler   No Yes   Sig: Take 2 Puffs by inhalation every four (4) hours as needed for Wheezing.       Facility-Administered Medications: None       Ananya Dolan

## 2023-03-16 NOTE — ED NOTES
Writer called [facility] and spoke with Nessa about getting the consult results faxed to [facility]. Fax number given [phone].

## 2023-03-16 NOTE — ED NOTES
MRI staff bring pt back to ER. Pt does not fit on MRI table. They report that [facility] has MRI large enough to fit pt. This nurse called attending and requested page.

## 2023-03-16 NOTE — ED NOTES
Pt tearful. She states that  her support system is her mother and Mr. Sheila Melvindidi Gomez) They all live in the same home. She reports that her mother and Nuno Bruce are always bickering and it makes it harder on her when she needs something. Pt given support and pt expresses that she does not want to talk to family at this time.

## 2023-03-17 ENCOUNTER — APPOINTMENT (OUTPATIENT)
Dept: NUCLEAR MEDICINE | Age: 33
End: 2023-03-17
Attending: PHYSICIAN ASSISTANT
Payer: COMMERCIAL

## 2023-03-17 ENCOUNTER — APPOINTMENT (OUTPATIENT)
Dept: NON INVASIVE DIAGNOSTICS | Age: 33
End: 2023-03-17
Attending: PSYCHIATRY & NEUROLOGY
Payer: COMMERCIAL

## 2023-03-17 ENCOUNTER — APPOINTMENT (OUTPATIENT)
Dept: NON INVASIVE DIAGNOSTICS | Age: 33
End: 2023-03-17
Attending: PHYSICIAN ASSISTANT
Payer: COMMERCIAL

## 2023-03-17 LAB
ALBUMIN SERPL-MCNC: 3 G/DL (ref 3.5–5)
ALBUMIN/GLOB SERPL: 0.8 (ref 1.1–2.2)
ALP SERPL-CCNC: 31 U/L (ref 45–117)
ALT SERPL-CCNC: 20 U/L (ref 12–78)
ANION GAP SERPL CALC-SCNC: 1 MMOL/L (ref 5–15)
APPEARANCE UR: ABNORMAL
AST SERPL W P-5'-P-CCNC: 12 U/L (ref 15–37)
BACTERIA URNS QL MICRO: ABNORMAL /HPF
BASOPHILS # BLD: 0.1 K/UL (ref 0–0.1)
BASOPHILS NFR BLD: 1 % (ref 0–1)
BILIRUB SERPL-MCNC: 0.2 MG/DL (ref 0.2–1)
BILIRUB UR QL: NEGATIVE
BUN SERPL-MCNC: 12 MG/DL (ref 6–20)
BUN/CREAT SERPL: 17 (ref 12–20)
CA-I BLD-MCNC: 8.8 MG/DL (ref 8.5–10.1)
CHLORIDE SERPL-SCNC: 108 MMOL/L (ref 97–108)
CHOLEST SERPL-MCNC: 171 MG/DL
CO2 SERPL-SCNC: 25 MMOL/L (ref 21–32)
COLOR UR: ABNORMAL
CREAT SERPL-MCNC: 0.7 MG/DL (ref 0.55–1.02)
DIFFERENTIAL METHOD BLD: ABNORMAL
ECHO AO ROOT DIAM: 2.7 CM
ECHO AO ROOT INDEX: 1.12 CM/M2
ECHO AR MAX VEL PISA: 3.5 M/S
ECHO AV AREA PEAK VELOCITY: 2.2 CM2
ECHO AV AREA/BSA PEAK VELOCITY: 0.9 CM2/M2
ECHO AV CUSP MM: 1.9 CM
ECHO AV PEAK GRADIENT: 9 MMHG
ECHO AV PEAK VELOCITY: 1.5 M/S
ECHO AV REGURGITANT PHT: 1184 MS
ECHO AV VELOCITY RATIO: 0.73
ECHO EST RA PRESSURE: 10 MMHG
ECHO LA AREA 2C: 20.3 CM2
ECHO LA AREA 4C: 18 CM2
ECHO LA DIAMETER INDEX: 1.49 CM/M2
ECHO LA DIAMETER: 3.6 CM
ECHO LA MAJOR AXIS: 5.6 CM
ECHO LA MINOR AXIS: 5.6 CM
ECHO LA TO AORTIC ROOT RATIO: 1.33
ECHO LA VOL BP: 52 ML (ref 22–52)
ECHO LA VOL/BSA BIPLANE: 22 ML/M2 (ref 16–34)
ECHO LV E' LATERAL VELOCITY: 12 CM/S
ECHO LV E' SEPTAL VELOCITY: 10 CM/S
ECHO LV EDV A2C: 116 ML
ECHO LV EDV A4C: 104 ML
ECHO LV EDV INDEX A4C: 43 ML/M2
ECHO LV EDV NDEX A2C: 48 ML/M2
ECHO LV EJECTION FRACTION A2C: 67 %
ECHO LV EJECTION FRACTION A4C: 68 %
ECHO LV EJECTION FRACTION BIPLANE: 68 % (ref 55–100)
ECHO LV ESV A2C: 38 ML
ECHO LV ESV A4C: 33 ML
ECHO LV ESV INDEX A2C: 16 ML/M2
ECHO LV ESV INDEX A4C: 14 ML/M2
ECHO LV FRACTIONAL SHORTENING: 33 % (ref 28–44)
ECHO LV INTERNAL DIMENSION DIASTOLE INDEX: 1.91 CM/M2
ECHO LV INTERNAL DIMENSION DIASTOLIC MMODE: 5.1 CM (ref 3.9–5.3)
ECHO LV INTERNAL DIMENSION DIASTOLIC: 4.6 CM (ref 3.9–5.3)
ECHO LV INTERNAL DIMENSION SYSTOLIC INDEX: 1.29 CM/M2
ECHO LV INTERNAL DIMENSION SYSTOLIC MMODE: 3.8 CM
ECHO LV INTERNAL DIMENSION SYSTOLIC: 3.1 CM
ECHO LV IVSD: 0.9 CM (ref 0.6–0.9)
ECHO LV MASS 2D: 137.7 G (ref 67–162)
ECHO LV MASS INDEX 2D: 57.1 G/M2 (ref 43–95)
ECHO LV POSTERIOR WALL DIASTOLIC MMODE: 0.9 CM (ref 0.6–0.9)
ECHO LV POSTERIOR WALL DIASTOLIC: 0.9 CM (ref 0.6–0.9)
ECHO LV RELATIVE WALL THICKNESS RATIO: 0.39
ECHO LVOT AREA: 3.1 CM2
ECHO LVOT DIAM: 2 CM
ECHO LVOT PEAK GRADIENT: 4 MMHG
ECHO LVOT PEAK VELOCITY: 1.1 M/S
ECHO MV A VELOCITY: 0.62 M/S
ECHO MV E DECELERATION TIME (DT): 194 MS
ECHO MV E VELOCITY: 0.91 M/S
ECHO MV E/A RATIO: 1.47
ECHO MV E/E' LATERAL: 7.58
ECHO MV E/E' RATIO (AVERAGED): 8.34
ECHO MV E/E' SEPTAL: 9.1
ECHO MV REGURGITANT PEAK GRADIENT: 55 MMHG
ECHO MV REGURGITANT PEAK VELOCITY: 3.7 M/S
ECHO PULMONARY ARTERY END DIASTOLIC PRESSURE: 2 MMHG
ECHO PV MAX VELOCITY: 1 M/S
ECHO PV PEAK GRADIENT: 4 MMHG
ECHO PV REGURGITANT MAX VELOCITY: 0.7 M/S
ECHO RIGHT VENTRICULAR SYSTOLIC PRESSURE (RVSP): 28 MMHG
ECHO RVOT PEAK GRADIENT: 2 MMHG
ECHO RVOT PEAK VELOCITY: 0.7 M/S
ECHO TV REGURGITANT MAX VELOCITY: 2.14 M/S
ECHO TV REGURGITANT PEAK GRADIENT: 18 MMHG
EOSINOPHIL # BLD: 0.3 K/UL (ref 0–0.4)
EOSINOPHIL NFR BLD: 4 % (ref 0–7)
EPITH CASTS URNS QL MICRO: ABNORMAL /LPF
ERYTHROCYTE [DISTWIDTH] IN BLOOD BY AUTOMATED COUNT: 13.5 % (ref 11.5–14.5)
EST. AVERAGE GLUCOSE BLD GHB EST-MCNC: 117 MG/DL
GLOBULIN SER CALC-MCNC: 4 G/DL (ref 2–4)
GLUCOSE SERPL-MCNC: 102 MG/DL (ref 65–100)
GLUCOSE UR STRIP.AUTO-MCNC: NEGATIVE MG/DL
HBA1C MFR BLD: 5.7 % (ref 4–5.6)
HCG UR QL: NEGATIVE
HCT VFR BLD AUTO: 33.9 % (ref 35–47)
HDLC SERPL-MCNC: 53 MG/DL
HDLC SERPL: 3.2 (ref 0–5)
HGB BLD-MCNC: 11 G/DL (ref 11.5–16)
HGB UR QL STRIP: ABNORMAL
IMM GRANULOCYTES # BLD AUTO: 0 K/UL (ref 0–0.04)
IMM GRANULOCYTES NFR BLD AUTO: 0 % (ref 0–0.5)
KETONES UR QL STRIP.AUTO: NEGATIVE MG/DL
LDLC SERPL CALC-MCNC: 104.4 MG/DL (ref 0–100)
LEUKOCYTE ESTERASE UR QL STRIP.AUTO: NEGATIVE
LIPID PROFILE,FLP: ABNORMAL
LYMPHOCYTES # BLD: 2.5 K/UL (ref 0.8–3.5)
LYMPHOCYTES NFR BLD: 34 % (ref 12–49)
MCH RBC QN AUTO: 27.8 PG (ref 26–34)
MCHC RBC AUTO-ENTMCNC: 32.4 G/DL (ref 30–36.5)
MCV RBC AUTO: 85.8 FL (ref 80–99)
MONOCYTES # BLD: 0.6 K/UL (ref 0–1)
MONOCYTES NFR BLD: 8 % (ref 5–13)
MUCOUS THREADS URNS QL MICRO: ABNORMAL /LPF
NEUTS SEG # BLD: 4 K/UL (ref 1.8–8)
NEUTS SEG NFR BLD: 53 % (ref 32–75)
NITRITE UR QL STRIP.AUTO: POSITIVE
NRBC # BLD: 0 K/UL (ref 0–0.01)
NRBC BLD-RTO: 0 PER 100 WBC
PH UR STRIP: 6 (ref 5–8)
PLATELET # BLD AUTO: 345 K/UL (ref 150–400)
PMV BLD AUTO: 9.8 FL (ref 8.9–12.9)
POTASSIUM SERPL-SCNC: 4.2 MMOL/L (ref 3.5–5.1)
PROLACTIN SERPL-MCNC: 5.3 NG/ML
PROT SERPL-MCNC: 7 G/DL (ref 6.4–8.2)
PROT UR STRIP-MCNC: NEGATIVE MG/DL
RBC # BLD AUTO: 3.95 M/UL (ref 3.8–5.2)
RBC #/AREA URNS HPF: ABNORMAL /HPF (ref 0–5)
SODIUM SERPL-SCNC: 134 MMOL/L (ref 136–145)
SP GR UR REFRACTOMETRY: 1.02 (ref 1–1.03)
TRIGL SERPL-MCNC: 68 MG/DL (ref ?–150)
UA: UC IF INDICATED,UAUC: ABNORMAL
UROBILINOGEN UR QL STRIP.AUTO: 0.1 EU/DL (ref 0.1–1)
VLDLC SERPL CALC-MCNC: 13.6 MG/DL
WBC # BLD AUTO: 7.5 K/UL (ref 3.6–11)
WBC URNS QL MICRO: ABNORMAL /HPF (ref 0–4)

## 2023-03-17 PROCEDURE — 74011250637 HC RX REV CODE- 250/637: Performed by: HOSPITALIST

## 2023-03-17 PROCEDURE — 85025 COMPLETE CBC W/AUTO DIFF WBC: CPT

## 2023-03-17 PROCEDURE — 93880 EXTRACRANIAL BILAT STUDY: CPT

## 2023-03-17 PROCEDURE — 83036 HEMOGLOBIN GLYCOSYLATED A1C: CPT

## 2023-03-17 PROCEDURE — 87086 URINE CULTURE/COLONY COUNT: CPT

## 2023-03-17 PROCEDURE — 94640 AIRWAY INHALATION TREATMENT: CPT

## 2023-03-17 PROCEDURE — 74011000250 HC RX REV CODE- 250: Performed by: HOSPITALIST

## 2023-03-17 PROCEDURE — 96375 TX/PRO/DX INJ NEW DRUG ADDON: CPT

## 2023-03-17 PROCEDURE — G0378 HOSPITAL OBSERVATION PER HR: HCPCS

## 2023-03-17 PROCEDURE — 74011250636 HC RX REV CODE- 250/636: Performed by: PHYSICIAN ASSISTANT

## 2023-03-17 PROCEDURE — A9557 TC99M BICISATE: HCPCS

## 2023-03-17 PROCEDURE — 80053 COMPREHEN METABOLIC PANEL: CPT

## 2023-03-17 PROCEDURE — 81001 URINALYSIS AUTO W/SCOPE: CPT

## 2023-03-17 PROCEDURE — 36415 COLL VENOUS BLD VENIPUNCTURE: CPT

## 2023-03-17 PROCEDURE — 80061 LIPID PANEL: CPT

## 2023-03-17 PROCEDURE — 74011250637 HC RX REV CODE- 250/637: Performed by: PHYSICIAN ASSISTANT

## 2023-03-17 PROCEDURE — 84146 ASSAY OF PROLACTIN: CPT

## 2023-03-17 PROCEDURE — 81025 URINE PREGNANCY TEST: CPT

## 2023-03-17 PROCEDURE — C8929 TTE W OR WO FOL WCON,DOPPLER: HCPCS

## 2023-03-17 PROCEDURE — 74011250636 HC RX REV CODE- 250/636

## 2023-03-17 RX ORDER — IPRATROPIUM BROMIDE AND ALBUTEROL SULFATE 2.5; .5 MG/3ML; MG/3ML
3 SOLUTION RESPIRATORY (INHALATION)
Status: DISCONTINUED | OUTPATIENT
Start: 2023-03-17 | End: 2023-03-19 | Stop reason: HOSPADM

## 2023-03-17 RX ADMIN — POTASSIUM CHLORIDE 40 MEQ: 1500 TABLET, EXTENDED RELEASE ORAL at 08:25

## 2023-03-17 RX ADMIN — IPRATROPIUM BROMIDE AND ALBUTEROL SULFATE 3 ML: 2.5; .5 SOLUTION RESPIRATORY (INHALATION) at 23:30

## 2023-03-17 RX ADMIN — PERFLUTREN 1 ML: 6.52 INJECTION, SUSPENSION INTRAVENOUS at 12:50

## 2023-03-17 RX ADMIN — ACYCLOVIR SODIUM 900 MG: 1000 INJECTION, SOLUTION INTRAVENOUS at 17:24

## 2023-03-17 RX ADMIN — ACETAMINOPHEN 650 MG: 325 TABLET ORAL at 14:11

## 2023-03-17 NOTE — PROGRESS NOTES
Problem: Falls - Risk of  Goal: *Absence of Falls  Description: Document Pierre Kapoor Fall Risk and appropriate interventions in the flowsheet.   Outcome: Progressing Towards Goal  Note: Fall Risk Interventions:  Mobility Interventions: Bed/chair exit alarm                             Problem: Patient Education: Go to Patient Education Activity  Goal: Patient/Family Education  Outcome: Progressing Towards Goal     Problem: Pain  Goal: *Control of Pain  Outcome: Progressing Towards Goal

## 2023-03-17 NOTE — PROGRESS NOTES
Problem: Falls - Risk of  Goal: *Absence of Falls  Description: Document Hema Livingston Fall Risk and appropriate interventions in the flowsheet. Outcome: Progressing Towards Goal  Note: Fall Risk Interventions:  Mobility Interventions: Bed/chair exit alarm                             Problem: Pressure Injury - Risk of  Goal: *Prevention of pressure injury  Description: Document Rakan Scale and appropriate interventions in the flowsheet.   Outcome: Progressing Towards Goal  Note: Pressure Injury Interventions:  Sensory Interventions: Minimize linen layers    Moisture Interventions: Minimize layers, Absorbent underpads    Activity Interventions: Increase time out of bed    Mobility Interventions: HOB 30 degrees or less, PT/OT evaluation    Nutrition Interventions: Document food/fluid/supplement intake

## 2023-03-17 NOTE — CONSULTS
NEURO CONSULT      REASON FOR ADMISSION:  Mental status changes      HISTORY:  Ms. Sarah Dias is a 27-year-old obese lady with a history of asthma, who was found on the floor at home with mental status changes. Patient was subsequently brought to the ED. In the ED, patient had a head CT without contrast that did not show any acute process. Her mental status has improved. ROS: As per above plus      General:                     No fever, no chills, no sweats, no generalized weakness, no weight loss/gain,                                       No loss of appetite   Eyes:                           No blurred vision, no eye pain, no loss of vision, no double vision  ENT:                            rhinorrhea, no pharyngitis   Respiratory:               No cough, no sputum production, no SOB, no KIM, no wheezing, no pleuritic pain   Cardiology:                No chest pain, no palpitations, no orthopnea, no PND, no edema, no syncope   Gastrointestinal:       No abdominal pain , no N/V, no diarrhea, no dysphagia, no constipation, no bleeding   Genitourinary:           frequency, no urgency, no dysuria, no hematuria, no incontinence   Muskuloskeletal :      No arthralgia, no myalgia, no back pain  Hematology:              No easy bruising, no nose or gum bleeding, no lymphadenopathy   Dermatological:         No rash, no ulceration, no pruritis, no color change / jaundice  Endocrine:                 hot flashes or polydipsia   Neurological:             No headache, no dizziness, no confusion, no focal weakness, no paresthesia,                                      No Speech difficulties, no memory loss, no gait difficulty  Psychological:          No neelings of anxiety, no depression, no agitation      NEURO EXAM:    Mental status: Awake though not oriented to month. She is not aphasic.     Cranial nerves: Cranial nerves intact    Motor exam: Motor exam intact    Sensory exam: Sensory exam intact    Coordination: Coordination fair    Gait and Station: Patient was not ambulated    ASSESSMENT:  Mental status changes that is improving. Rule out hypoxia  Rule out medication  Rule out metabolic  Rule out peripheral infection  Doubt seizure      PLAN:  Seizure precautions  Other work-up is progressing  EEG  Prolactin  Lipid profile  Echo cardiac  Carotid duplex  Patient seen, evaluated and treated.   Total amount of time taken 36 minutes      ALLERGIES:    No Known Allergies    MEDS:      Current Facility-Administered Medications:     acyclovir (ZOVIRAX) 650 mg in 0.9% sodium chloride 100 mL IVPB, 10 mg/kg (Ideal), IntraVENous, Q8H, Paul Pennington PA-C    perflutren lipid microspheres (DEFINITY) 1.1 mg/mL contrast injection, , , ,     [Held by provider] aspirin chewable tablet 81 mg, 81 mg, Oral, DAILY, Curly Abreu MD, 81 mg at 03/16/23 2724    acetaminophen (TYLENOL) tablet 650 mg, 650 mg, Oral, Q4H PRN **OR** acetaminophen (TYLENOL) solution 650 mg, 650 mg, Per NG tube, Q4H PRN **OR** acetaminophen (TYLENOL) suppository 650 mg, 650 mg, Rectal, Q4H PRN, Curly Abreu MD    butalbital-acetaminophen-caffeine (FIORICET, ESGIC) -40 mg per tablet 2 Tablet, 2 Tablet, Oral, Q6H PRN, Curly Abreu MD, 2 Tablet at 03/16/23 1731    ketorolac (TORADOL) injection 15 mg, 15 mg, IntraVENous, Q6H PRN, Paul Pennington PA-C    hydrALAZINE (APRESOLINE) 20 mg/mL injection 20 mg, 20 mg, IntraVENous, Q6H PRN, Paul Pennington PA-C    melatonin tablet 3 mg, 3 mg, Oral, QHS PRN, Paul Pennington PA-C    ondansetron (ZOFRAN) injection 4 mg, 4 mg, IntraVENous, Q6H PRN, Elizabeth Pennington PA-C    LABS:  Recent Results (from the past 24 hour(s))   PROCALCITONIN    Collection Time: 03/16/23  7:25 PM   Result Value Ref Range    Procalcitonin <0.05 (H) 0 ng/mL   CBC WITH AUTOMATED DIFF    Collection Time: 03/17/23  9:57 AM   Result Value Ref Range    WBC 7.5 3.6 - 11.0 K/uL    RBC 3.95 3.80 - 5.20 M/uL    HGB 11.0 (L) 11.5 - 16.0 g/dL    HCT 33.9 (L) 35.0 - 47.0 %    MCV 85.8 80.0 - 99.0 FL    MCH 27.8 26.0 - 34.0 PG    MCHC 32.4 30.0 - 36.5 g/dL    RDW 13.5 11.5 - 14.5 %    PLATELET 464 861 - 680 K/uL    MPV 9.8 8.9 - 12.9 FL    NRBC 0.0 0.0  WBC    ABSOLUTE NRBC 0.00 0.00 - 0.01 K/uL    NEUTROPHILS 53 32 - 75 %    LYMPHOCYTES 34 12 - 49 %    MONOCYTES 8 5 - 13 %    EOSINOPHILS 4 0 - 7 %    BASOPHILS 1 0 - 1 %    IMMATURE GRANULOCYTES 0 0 - 0.5 %    ABS. NEUTROPHILS 4.0 1.8 - 8.0 K/UL    ABS. LYMPHOCYTES 2.5 0.8 - 3.5 K/UL    ABS. MONOCYTES 0.6 0.0 - 1.0 K/UL    ABS. EOSINOPHILS 0.3 0.0 - 0.4 K/UL    ABS. BASOPHILS 0.1 0.0 - 0.1 K/UL    ABS. IMM. GRANS. 0.0 0.00 - 0.04 K/UL    DF AUTOMATED     METABOLIC PANEL, COMPREHENSIVE    Collection Time: 03/17/23  9:57 AM   Result Value Ref Range    Sodium 134 (L) 136 - 145 mmol/L    Potassium 4.2 3.5 - 5.1 mmol/L    Chloride 108 97 - 108 mmol/L    CO2 25 21 - 32 mmol/L    Anion gap 1 (L) 5 - 15 mmol/L    Glucose 102 (H) 65 - 100 mg/dL    BUN 12 6 - 20 mg/dL    Creatinine 0.70 0.55 - 1.02 mg/dL    BUN/Creatinine ratio 17 12 - 20      eGFR >60 >60 ml/min/1.73m2    Calcium 8.8 8.5 - 10.1 mg/dL    Bilirubin, total 0.2 0.2 - 1.0 mg/dL    AST (SGOT) 12 (L) 15 - 37 U/L    ALT (SGPT) 20 12 - 78 U/L    Alk. phosphatase 31 (L) 45 - 117 U/L    Protein, total 7.0 6.4 - 8.2 g/dL    Albumin 3.0 (L) 3.5 - 5.0 g/dL    Globulin 4.0 2.0 - 4.0 g/dL    A-G Ratio 0.8 (L) 1.1 - 2.2         Visit Vitals  /80 (BP 1 Location: Right upper arm, BP Patient Position: At rest)   Pulse 68   Temp 97.8 °F (36.6 °C)   Resp 16   Ht 5' 8\" (1.727 m)   Wt 133.8 kg (295 lb)   SpO2 97%   BMI 44.85 kg/m²       Imaging:  CTA CODE NEURO HEAD AND NECK W CONT   Final Result      1. No acute process. No large vessel occlusion, arterial dissection, or   flow-limiting stenosis. 2. CT perfusion not performed. If high clinical concern for acute process,   consider MRI (unless contraindicated).        CT CODE NEURO HEAD WO CONTRAST   Final Result   No acute intracranial hemorrhage, mass or infarct.           XR SPINAL PUNC LUMB DX    (Results Pending)   NM BRAIN SCAN JAVAD SPECT    (Results Pending)

## 2023-03-17 NOTE — PROGRESS NOTES
[id number]: Noted order to start Acyclovir via intravenous to start this morning. Pharmacy called and said that they need to find out if the patient is negative for a pregnancy test. Informed Dr. Lida Stoner via [employer]. New MD order carried out.

## 2023-03-18 LAB
LEFT CCA DIST DIAS: 23.7 CM/S
LEFT CCA DIST SYS: 85.5 CM/S
LEFT CCA PROX DIAS: 22.2 CM/S
LEFT CCA PROX SYS: 91.7 CM/S
LEFT ECA DIAS: 13 CM/S
LEFT ECA SYS: 82.5 CM/S
LEFT ICA DIST DIAS: 26 CM/S
LEFT ICA DIST SYS: 73.2 CM/S
LEFT ICA PROX DIAS: 23.7 CM/S
LEFT ICA PROX SYS: 90.9 CM/S
LEFT ICA/CCA SYS: 1.06
LEFT VERTEBRAL DIAS: 23.1 CM/S
LEFT VERTEBRAL SYS: 57 CM/S
RIGHT CCA DIST DIAS: 39.7 CM/S
RIGHT CCA DIST SYS: 99.3 CM/S
RIGHT CCA PROX DIAS: 17.6 CM/S
RIGHT CCA PROX SYS: 79.4 CM/S
RIGHT ECA DIAS: 8.59 CM/S
RIGHT ECA SYS: 66.2 CM/S
RIGHT ICA DIST DIAS: 50.7 CM/S
RIGHT ICA DIST SYS: 85 CM/S
RIGHT ICA PROX DIAS: 20.6 CM/S
RIGHT ICA PROX SYS: 55 CM/S
RIGHT ICA/CCA SYS: 0.55
RIGHT VERTEBRAL DIAS: 16.8 CM/S
RIGHT VERTEBRAL SYS: 47.8 CM/S
VAS LEFT SUBCLAVIAN PROX EDV: 0 CM/S
VAS LEFT SUBCLAVIAN PROX PSV: 111 CM/S
VAS RIGHT SUBCLAVIAN PROX EDV: 0 CM/S
VAS RIGHT SUBCLAVIAN PROX PSV: 121 CM/S

## 2023-03-18 PROCEDURE — G0378 HOSPITAL OBSERVATION PER HR: HCPCS

## 2023-03-18 PROCEDURE — 96375 TX/PRO/DX INJ NEW DRUG ADDON: CPT

## 2023-03-18 PROCEDURE — 74011250636 HC RX REV CODE- 250/636: Performed by: PHYSICIAN ASSISTANT

## 2023-03-18 PROCEDURE — 74011000250 HC RX REV CODE- 250: Performed by: HOSPITALIST

## 2023-03-18 PROCEDURE — 74011250636 HC RX REV CODE- 250/636: Performed by: INTERNAL MEDICINE

## 2023-03-18 PROCEDURE — 96376 TX/PRO/DX INJ SAME DRUG ADON: CPT

## 2023-03-18 PROCEDURE — 94640 AIRWAY INHALATION TREATMENT: CPT

## 2023-03-18 PROCEDURE — 74011250637 HC RX REV CODE- 250/637: Performed by: INTERNAL MEDICINE

## 2023-03-18 PROCEDURE — 94761 N-INVAS EAR/PLS OXIMETRY MLT: CPT

## 2023-03-18 PROCEDURE — 74011000258 HC RX REV CODE- 258: Performed by: INTERNAL MEDICINE

## 2023-03-18 RX ORDER — FLUTICASONE PROPIONATE 110 UG/1
2 AEROSOL, METERED RESPIRATORY (INHALATION)
Status: DISCONTINUED | OUTPATIENT
Start: 2023-03-18 | End: 2023-03-19 | Stop reason: HOSPADM

## 2023-03-18 RX ORDER — TOPIRAMATE 25 MG/1
25 CAPSULE, COATED PELLETS ORAL EVERY 12 HOURS
Status: DISCONTINUED | OUTPATIENT
Start: 2023-03-18 | End: 2023-03-19 | Stop reason: HOSPADM

## 2023-03-18 RX ORDER — METOCLOPRAMIDE HYDROCHLORIDE 5 MG/ML
10 INJECTION INTRAMUSCULAR; INTRAVENOUS
Status: DISCONTINUED | OUTPATIENT
Start: 2023-03-18 | End: 2023-03-19 | Stop reason: HOSPADM

## 2023-03-18 RX ORDER — KETOROLAC TROMETHAMINE 30 MG/ML
30 INJECTION, SOLUTION INTRAMUSCULAR; INTRAVENOUS EVERY 6 HOURS
Status: COMPLETED | OUTPATIENT
Start: 2023-03-18 | End: 2023-03-19

## 2023-03-18 RX ADMIN — TOPIRAMATE 25 MG: 25 CAPSULE, COATED PELLETS ORAL at 12:14

## 2023-03-18 RX ADMIN — METOCLOPRAMIDE 10 MG: 5 INJECTION, SOLUTION INTRAMUSCULAR; INTRAVENOUS at 20:05

## 2023-03-18 RX ADMIN — FLUTICASONE PROPIONATE 2 PUFF: 110 AEROSOL, METERED RESPIRATORY (INHALATION) at 20:50

## 2023-03-18 RX ADMIN — KETOROLAC TROMETHAMINE 30 MG: 30 INJECTION, SOLUTION INTRAMUSCULAR; INTRAVENOUS at 18:05

## 2023-03-18 RX ADMIN — ACYCLOVIR SODIUM 900 MG: 1000 INJECTION, SOLUTION INTRAVENOUS at 16:55

## 2023-03-18 RX ADMIN — KETOROLAC TROMETHAMINE 30 MG: 30 INJECTION, SOLUTION INTRAMUSCULAR; INTRAVENOUS at 12:14

## 2023-03-18 RX ADMIN — DIHYDROERGOTAMINE MESYLATE 0.5 MG: 1 INJECTION, SOLUTION INTRAMUSCULAR; INTRAVENOUS; SUBCUTANEOUS at 20:37

## 2023-03-18 RX ADMIN — DIHYDROERGOTAMINE MESYLATE 0.5 MG: 1 INJECTION, SOLUTION INTRAMUSCULAR; INTRAVENOUS; SUBCUTANEOUS at 12:49

## 2023-03-18 RX ADMIN — ACYCLOVIR SODIUM 900 MG: 1000 INJECTION, SOLUTION INTRAVENOUS at 07:46

## 2023-03-18 RX ADMIN — ACYCLOVIR SODIUM 900 MG: 1000 INJECTION, SOLUTION INTRAVENOUS at 23:03

## 2023-03-18 RX ADMIN — TOPIRAMATE 25 MG: 25 CAPSULE, COATED PELLETS ORAL at 20:42

## 2023-03-18 RX ADMIN — ACYCLOVIR SODIUM 900 MG: 1000 INJECTION, SOLUTION INTRAVENOUS at 00:15

## 2023-03-18 RX ADMIN — IPRATROPIUM BROMIDE AND ALBUTEROL SULFATE 3 ML: 2.5; .5 SOLUTION RESPIRATORY (INHALATION) at 08:31

## 2023-03-18 RX ADMIN — KETOROLAC TROMETHAMINE 30 MG: 30 INJECTION, SOLUTION INTRAMUSCULAR; INTRAVENOUS at 23:03

## 2023-03-18 NOTE — PROGRESS NOTES
Hospitalist Progress Note               Daily Progress Note: 3/18/2023      Subjective:   Hospital course to date:    Patient is a 29 yo female with a PMH of asthma and chronic, recurrent headache was admitted from the ED on 3/15 for altered mental status and found on the floor unresponsive by her family. Patient had a similar episode in August 2021. It sounds like this was labeled as a stroke although was more likely a complicated migraine. She ended up having an outpatient MRI which reportedly did not show evidence for stroke     She reportedly had a headache prior to symptoms. Patient was reportedly talking very slow when found and hardly able to hear. Confirmed left arm weakness. Upon admission, patient condition improves but confirms photophobia. Level 2 stroke alert occurred. Labs upon admission show WBC 11.3, Hgb 11.4. Labs on 3/16 show TSH 5.79    CT of the head on 3/15 was normal.    Neurology consulted on 3/17 and ordered procalcitonin, lipid profile, echo, carotid duplex. UA on 3/17 shows positive nitrites and 1+ bacteria    Urine toxicology screen is positive for THC. On 3/17, patient was supposed to undergo MRI of the head, but was unable due to body habitus. Will do MRI outpatient.    --------  Patient is seen today for follow-up. Confirms left upper and lower extremity weakness. States numbness in extremity is better. Patient had 2 episodes of asthma attacks, and was given nebulizer treatment. Denies current dyspnea, cough, wheeze, abdominal pain, nausea, vomiting, chest pain. Echo results were normal.    Carotid duplex results were normal.    Blood culture is negative. Awaiting urine culture results.       Problem List:  Problem List as of 3/18/2023 Date Reviewed: 3/16/2023            Codes Class Noted - Resolved    Altered mental status ICD-10-CM: R41.82  ICD-9-CM: 780.97  3/16/2023 - Present        Migraine headache ICD-10-CM: X49.608  ICD-9-CM: 346.90  3/16/2023 - Present CVA (cerebral vascular accident) [facility]) ICD-10-CM: I63.9  ICD-9-CM: 434.91  2021 - Present         Medications reviewed  Current Facility-Administered Medications   Medication Dose Route Frequency    fluticasone (FLOVENT HFA) 110 mcg inhaler  2 Puff Inhalation BID RT    ketorolac (TORADOL) injection 30 mg  30 mg IntraVENous Q6H    methylPREDNISolone (PF) (Solu-MEDROL) 250 mg in 0.9% sodium chloride 100 mL IVPB  250 mg IntraVENous Q6H    topiramate (TOPAMAX) sprinkle capsule 25 mg  25 mg Oral Q12H    acyclovir (ZOVIRAX) 900 mg in 0.9% sodium chloride 250 mL IVPB  900 mg IntraVENous Q8H    albuterol-ipratropium (DUO-NEB) 2.5 MG-0.5 MG/3 ML  3 mL Nebulization Q4H PRN    [Held by provider] aspirin chewable tablet 81 mg  81 mg Oral DAILY    acetaminophen (TYLENOL) tablet 650 mg  650 mg Oral Q4H PRN    Or    acetaminophen (TYLENOL) solution 650 mg  650 mg Per NG tube Q4H PRN    Or    acetaminophen (TYLENOL) suppository 650 mg  650 mg Rectal Q4H PRN    butalbital-acetaminophen-caffeine (FIORICET, ESGIC) -40 mg per tablet 2 Tablet  2 Tablet Oral Q6H PRN    ketorolac (TORADOL) injection 15 mg  15 mg IntraVENous Q6H PRN    hydrALAZINE (APRESOLINE) 20 mg/mL injection 20 mg  20 mg IntraVENous Q6H PRN    melatonin tablet 3 mg  3 mg Oral QHS PRN    ondansetron (ZOFRAN) injection 4 mg  4 mg IntraVENous Q6H PRN       Review of Systems:   A comprehensive review of systems was negative except for that written in the HPI. Objective:   Physical Exam:     Visit Vitals  /89 (BP 1 Location: Left upper arm, BP Patient Position: Supine)   Pulse 78   Temp 98.1 °F (36.7 °C)   Resp 16   Ht 5' 8\" (1.727 m)   Wt 133.8 kg (295 lb)   SpO2 99%   BMI 44.85 kg/m²      O2 Device: None (Room air)    Temp (24hrs), Av.8 °F (36.6 °C), Min:97.4 °F (36.3 °C), Max:98.1 °F (36.7 °C)    No intake/output data recorded.     1901 -  0700  In: -   Out: 800 [Urine:800]    General:   Awake and alert   Lungs:   Clear to auscultation bilaterally. Chest wall:  No tenderness or deformity. Heart:  Regular rate and rhythm, S1, S2 normal, no murmur, click, rub or gallop. Abdomen:   Soft, non-tender. Bowel sounds normal. No masses,  No organomegaly. Extremities: Extremities normal, atraumatic, no cyanosis or edema. Pulses: 2+ and symmetric all extremities. Skin: Skin color, texture, turgor normal. No rashes or lesions   Neurologic: CNII-XII intact. Decreased temperature Strength 3/5 for upper and lower left extremities. Data Review:       Recent Days:  Recent Labs     03/17/23  0957 03/15/23  2314   WBC 7.5 11.3*   HGB 11.0* 11.4*   HCT 33.9* 35.1    355       Recent Labs     03/17/23  0957 03/16/23  0355 03/15/23  2314   * 138 138   K 4.2 3.4* 4.3    108 106   CO2 25 25 27   * 92 83   BUN 12 17 20   CREA 0.70 0.64 0.73   CA 8.8 8.8 9.4   ALB 3.0*  --  3.3*   TBILI 0.2  --  0.2   ALT 20  --  19   INR  --   --  1.1       No results for input(s): PH, PCO2, PO2, HCO3, FIO2 in the last 72 hours.     24 Hour Results:  Recent Results (from the past 24 hour(s))   ECHO ADULT COMPLETE    Collection Time: 03/17/23 12:49 PM   Result Value Ref Range    LV EDV A2C 116 mL    LV EDV A4C 104 mL    LV ESV A2C 38 mL    LV ESV A4C 33 mL    IVSd 0.9 0.6 - 0.9 cm    LVIDd 4.6 3.9 - 5.3 cm    LVIDd M-mode 5.1 3.9 - 5.3 cm    LVIDs 3.1 cm    LVIDs M-mode 3.8 cm    LVOT Diameter 2.0 cm    LVOT Peak Velocity 1.1 m/s    LVOT Peak Gradient 4 mmHg    LVPWd 0.9 0.6 - 0.9 cm    LVPWd M-mode 0.9 0.6 - 0.9 cm    LV E' Lateral Velocity 12 cm/s    LV E' Septal Velocity 10 cm/s    LV Ejection Fraction A2C 67 %    LV Ejection Fraction A4C 68 %    EF BP 68 55 - 100 %    LVOT Area 3.1 cm2    LA Minor Axis 5.6 cm    LA Major Axis 5.6 cm    LA Area 2C 20.3 cm2    LA Area 4C 18.0 cm2    LA Volume BP 52 22 - 52 mL    LA Diameter 3.6 cm    AR PHT 1,184.0 ms    AR Max Velocity PISA 3.5 m/s    AV Peak Velocity 1.5 m/s    AV Peak Gradient 9 mmHg    AV Cusp Mmode 1.9 cm    AV Area by Peak Velocity 2.2 cm2    Aortic Root 2.7 cm    MR Peak Velocity 3.7 m/s    MR Peak Gradient 55 mmHg    MV E Wave Deceleration Time 194.0 ms    MV A Velocity 0.62 m/s    MV E Velocity 0.91 m/s    NY Max Velocity 0.7 m/s    Pulmonary Artery EDP 2 mmHg    PV Max Velocity 1.0 m/s    PV Peak Gradient 4 mmHg    Est. RA Pressure 10 mmHg    RVOT Peak Velocity 0.7 m/s    RVOT Peak Gradient 2 mmHg    TR Max Velocity 2.14 m/s    TR Peak Gradient 18 mmHg    Fractional Shortening 2D 33 28 - 44 %    LV ESV Index A4C 14 mL/m2    LV EDV Index A4C 43 mL/m2    LV ESV Index A2C 16 mL/m2    LV EDV Index A2C 48 mL/m2    LVIDd Index 1.91 cm/m2    LVIDs Index 1.29 cm/m2    LV RWT Ratio 0.39     LV Mass 2D 137.7 67 - 162 g    LV Mass 2D Index 57.1 43 - 95 g/m2    MV E/A 1.47     E/E' Ratio (Averaged) 8.34     E/E' Lateral 7.58     E/E' Septal 9.10     LA Volume Index BP 22 16 - 34 ml/m2    LA Size Index 1.49 cm/m2    LA/AO Root Ratio 1.33     Ao Root Index 1.12 cm/m2    AV Velocity Ratio 0.73     SIMONE/BSA Peak Velocity 0.9 cm2/m2    RVSP 28 mmHg   DUPLEX CAROTID BILATERAL    Collection Time: 03/17/23  1:21 PM   Result Value Ref Range    Left CCA dist sys 85.5 cm/s    Left CCA dist dong 23.7 cm/s    Left CCA prox sys 91.7 cm/s    Left CCA prox dong 22.2 cm/s    Left ICA dist sys 73.2 cm/s    Left ICA dist dong 26.0 cm/s    Left ICA prox sys 90.9 cm/s    Left ICA prox dong 23.7 cm/s    Left ECA sys 82.5 cm/s    LEFT EXTERNAL CAROTID ARTERY D 13.00 cm/s    Left subclavian prox .0 cm/s    Left subclavian prox EDV 0.0 cm/s    Left vertebral sys 57.0 cm/s    LEFT VERTEBRAL ARTERY D 23.10 cm/s    Right cca dist sys 99.3 cm/s    Right CCA dist dong 39.7 cm/s    Right CCA prox sys 79.4 cm/s    Right CCA prox dong 17.6 cm/s    Right ICA dist sys 85.0 cm/s    Right ICA dist dong 50.7 cm/s    Right ICA prox sys 55.0 cm/s    Right ICA prox dong 20.6 cm/s    Right eca sys 66.2 cm/s    RIGHT EXTERNAL CAROTID ARTERY D 8.59 cm/s    Right subclavian prox .0 cm/s    Right subclavian prox EDV 0.0 cm/s    Right vertebral sys 47.8 cm/s    RIGHT VERTEBRAL ARTERY D 16.80 cm/s    Right ICA/CCA sys 0.55     Left ICA/CCA sys 1.06        DUPLEX CAROTID BILATERAL   Final Result      NM BRAIN SCAN JAVAD SPECT   Final Result   1. Normal brain SPECT scintigraphy. CTA CODE NEURO HEAD AND NECK W CONT   Final Result      1. No acute process. No large vessel occlusion, arterial dissection, or   flow-limiting stenosis. 2. CT perfusion not performed. If high clinical concern for acute process,   consider MRI (unless contraindicated). CT CODE NEURO HEAD WO CONTRAST   Final Result   No acute intracranial hemorrhage, mass or infarct. XR SPINAL PUNC LUMB DX    (Results Pending)        Assessment:    Likely complicated migraine with left hemiplegia   -Highly doubt stroke- negative nuclear scan    Potential hypothyroidism  -increased TSH    History of chronic migraines    History of asthma    Hyponatremia    Morbid obesity  -BMI 44.85    History of herpes    Discussion/MDM: Patient with multiple medical comorbidities, each with high likelihood for morbidity and mortality if left untreated. I have reviewed patient's presenting subjective and objective findings, as well as all laboratory studies, imaging studies, and vital signs to date as well as treatment rendered and patient's response to those treatments. In addition, prior medical, surgical and relevant social and family histories were reviewed. I have discussed management plan with patient/family and with nursing staff.          Plan:  Discontinue Fioricet   We will give DHE 0.5 mg every 6 hours x3 doses, preceded by Reglan 10 mg 30 minutes before each dose  We will give Toradol 30 mg IV every 6 hours x4 doses  Order Topamax for migraine maintenance  Tele Neurology consult    I do not feel that patient requires a lumbar puncture      Care Plan discussed with: Patient/Family    Code status: full code    Social determinants of health: none    Disposition/barriers to discharge: continue inpatient    Total time spent with patient:  25 minutes    Bhavin Cotto MD   *ATTENTION:  This note has been created by a medical student for educational purposes only. Please do not refer to the content of this note for clinical decision-making, billing, or other purposes. Please see attending physicians note to obtain clinical information on this patient. *

## 2023-03-18 NOTE — PROGRESS NOTES
Called [facility] about consult @ 15:44 pm. Tried faxing consult to [phone] @ 15:00 and [phone] @ 5:45 pm and would not go threw.

## 2023-03-18 NOTE — PROGRESS NOTES
Hospitalist Progress Note               Daily Progress Note: 3/18/2023      Subjective:   Hospital course to date:    Patient is a 29 yo female with a PMH of asthma and chronic, recurrent headache was admitted from the ED on 3/15 for altered mental status and found on the floor unresponsive by her family. Patient had a similar episode in August 2021. She reportedly had a headache prior to symptoms. Patient was reportedly talking very slow when found and hardly able to hear. Confirmed left arm weakness. Upon admission, patient condition improves but confirms photophobia. Level 2 stroke alert occurred. Labs upon admission show WBC 11.3, Hgb 11.4. Labs on 3/16 show TSH 5.79    CT of the head on 3/15 was normal.    Neurology consulted on 3/17 and ordered procalcitonin, lipid profile, echo, carotid duplex. UA on 3/17 shows positive nitrites and 1+ bacteria    Urine toxicology screen is positive for THC. On 3/17, patient was supposed to undergo MRI of the head, but was unable due to body habitus. Will do MRI outpatient.    --------  Patient is seen today for follow-up. Confirms left upper and lower extremity weakness. States numbness in extremity is better. Patient had 2 episodes of asthma attacks, and was given nebulizer treatment. Denies current dyspnea, cough, wheeze, abdominal pain, nausea, vomiting, chest pain. Echo results were normal.    Carotid duplex results were normal.    Blood culture is negative. Awaiting urine culture results.       Problem List:  Problem List as of 3/18/2023 Date Reviewed: 3/16/2023            Codes Class Noted - Resolved    Altered mental status ICD-10-CM: R41.82  ICD-9-CM: 780.97  3/16/2023 - Present        Migraine headache ICD-10-CM: M97.091  ICD-9-CM: 346.90  3/16/2023 - Present        CVA (cerebral vascular accident) [facility]) ICD-10-CM: I63.9  ICD-9-CM: 434.91  8/26/2021 - Present           Medications reviewed  Current Facility-Administered Medications   Medication Dose Route Frequency    fluticasone (FLOVENT HFA) 110 mcg inhaler  2 Puff Inhalation BID RT    acyclovir (ZOVIRAX) 900 mg in 0.9% sodium chloride 250 mL IVPB  900 mg IntraVENous Q8H    albuterol-ipratropium (DUO-NEB) 2.5 MG-0.5 MG/3 ML  3 mL Nebulization Q4H PRN    [Held by provider] aspirin chewable tablet 81 mg  81 mg Oral DAILY    acetaminophen (TYLENOL) tablet 650 mg  650 mg Oral Q4H PRN    Or    acetaminophen (TYLENOL) solution 650 mg  650 mg Per NG tube Q4H PRN    Or    acetaminophen (TYLENOL) suppository 650 mg  650 mg Rectal Q4H PRN    butalbital-acetaminophen-caffeine (FIORICET, ESGIC) -40 mg per tablet 2 Tablet  2 Tablet Oral Q6H PRN    ketorolac (TORADOL) injection 15 mg  15 mg IntraVENous Q6H PRN    hydrALAZINE (APRESOLINE) 20 mg/mL injection 20 mg  20 mg IntraVENous Q6H PRN    melatonin tablet 3 mg  3 mg Oral QHS PRN    ondansetron (ZOFRAN) injection 4 mg  4 mg IntraVENous Q6H PRN       Review of Systems:   A comprehensive review of systems was negative except for that written in the HPI. Objective:   Physical Exam:     Visit Vitals  /89 (BP 1 Location: Left upper arm, BP Patient Position: Supine)   Pulse 78   Temp 98.1 °F (36.7 °C)   Resp 16   Ht 5' 8\" (1.727 m)   Wt 133.8 kg (295 lb)   SpO2 99%   BMI 44.85 kg/m²      O2 Device: None (Room air)    Temp (24hrs), Av.8 °F (36.6 °C), Min:97.4 °F (36.3 °C), Max:98.1 °F (36.7 °C)    No intake/output data recorded.  1901 -  0700  In: -   Out: 800 [Urine:800]    General:   Awake and alert   Lungs:   Clear to auscultation bilaterally. Chest wall:  No tenderness or deformity. Heart:  Regular rate and rhythm, S1, S2 normal, no murmur, click, rub or gallop. Abdomen:   Soft, non-tender. Bowel sounds normal. No masses,  No organomegaly. Extremities: Extremities normal, atraumatic, no cyanosis or edema. Pulses: 2+ and symmetric all extremities.    Skin: Skin color, texture, turgor normal. No rashes or lesions   Neurologic: CNII-XII intact. Decreased temperature Strength 3/5 for upper and lower left extremities. Data Review:       Recent Days:  Recent Labs     03/17/23  0957 03/15/23  2314   WBC 7.5 11.3*   HGB 11.0* 11.4*   HCT 33.9* 35.1    355     Recent Labs     03/17/23  0957 03/16/23  0355 03/15/23  2314   * 138 138   K 4.2 3.4* 4.3    108 106   CO2 25 25 27   * 92 83   BUN 12 17 20   CREA 0.70 0.64 0.73   CA 8.8 8.8 9.4   ALB 3.0*  --  3.3*   TBILI 0.2  --  0.2   ALT 20  --  19   INR  --   --  1.1     No results for input(s): PH, PCO2, PO2, HCO3, FIO2 in the last 72 hours.     24 Hour Results:  Recent Results (from the past 24 hour(s))   ECHO ADULT COMPLETE    Collection Time: 03/17/23 12:49 PM   Result Value Ref Range    LV EDV A2C 116 mL    LV EDV A4C 104 mL    LV ESV A2C 38 mL    LV ESV A4C 33 mL    IVSd 0.9 0.6 - 0.9 cm    LVIDd 4.6 3.9 - 5.3 cm    LVIDd M-mode 5.1 3.9 - 5.3 cm    LVIDs 3.1 cm    LVIDs M-mode 3.8 cm    LVOT Diameter 2.0 cm    LVOT Peak Velocity 1.1 m/s    LVOT Peak Gradient 4 mmHg    LVPWd 0.9 0.6 - 0.9 cm    LVPWd M-mode 0.9 0.6 - 0.9 cm    LV E' Lateral Velocity 12 cm/s    LV E' Septal Velocity 10 cm/s    LV Ejection Fraction A2C 67 %    LV Ejection Fraction A4C 68 %    EF BP 68 55 - 100 %    LVOT Area 3.1 cm2    LA Minor Axis 5.6 cm    LA Major Axis 5.6 cm    LA Area 2C 20.3 cm2    LA Area 4C 18.0 cm2    LA Volume BP 52 22 - 52 mL    LA Diameter 3.6 cm    AR PHT 1,184.0 ms    AR Max Velocity PISA 3.5 m/s    AV Peak Velocity 1.5 m/s    AV Peak Gradient 9 mmHg    AV Cusp Mmode 1.9 cm    AV Area by Peak Velocity 2.2 cm2    Aortic Root 2.7 cm    MR Peak Velocity 3.7 m/s    MR Peak Gradient 55 mmHg    MV E Wave Deceleration Time 194.0 ms    MV A Velocity 0.62 m/s    MV E Velocity 0.91 m/s    FL Max Velocity 0.7 m/s    Pulmonary Artery EDP 2 mmHg    PV Max Velocity 1.0 m/s    PV Peak Gradient 4 mmHg    Est. RA Pressure 10 mmHg    RVOT Peak Velocity 0.7 m/s    RVOT Peak Gradient 2 mmHg    TR Max Velocity 2.14 m/s    TR Peak Gradient 18 mmHg    Fractional Shortening 2D 33 28 - 44 %    LV ESV Index A4C 14 mL/m2    LV EDV Index A4C 43 mL/m2    LV ESV Index A2C 16 mL/m2    LV EDV Index A2C 48 mL/m2    LVIDd Index 1.91 cm/m2    LVIDs Index 1.29 cm/m2    LV RWT Ratio 0.39     LV Mass 2D 137.7 67 - 162 g    LV Mass 2D Index 57.1 43 - 95 g/m2    MV E/A 1.47     E/E' Ratio (Averaged) 8.34     E/E' Lateral 7.58     E/E' Septal 9.10     LA Volume Index BP 22 16 - 34 ml/m2    LA Size Index 1.49 cm/m2    LA/AO Root Ratio 1.33     Ao Root Index 1.12 cm/m2    AV Velocity Ratio 0.73     SIMONE/BSA Peak Velocity 0.9 cm2/m2    RVSP 28 mmHg   DUPLEX CAROTID BILATERAL    Collection Time: 03/17/23  1:21 PM   Result Value Ref Range    Left CCA dist sys 85.5 cm/s    Left CCA dist dong 23.7 cm/s    Left CCA prox sys 91.7 cm/s    Left CCA prox dong 22.2 cm/s    Left ICA dist sys 73.2 cm/s    Left ICA dist dong 26.0 cm/s    Left ICA prox sys 90.9 cm/s    Left ICA prox dong 23.7 cm/s    Left ECA sys 82.5 cm/s    LEFT EXTERNAL CAROTID ARTERY D 13.00 cm/s    Left subclavian prox .0 cm/s    Left subclavian prox EDV 0.0 cm/s    Left vertebral sys 57.0 cm/s    LEFT VERTEBRAL ARTERY D 23.10 cm/s    Right cca dist sys 99.3 cm/s    Right CCA dist dong 39.7 cm/s    Right CCA prox sys 79.4 cm/s    Right CCA prox dong 17.6 cm/s    Right ICA dist sys 85.0 cm/s    Right ICA dist dong 50.7 cm/s    Right ICA prox sys 55.0 cm/s    Right ICA prox dong 20.6 cm/s    Right eca sys 66.2 cm/s    RIGHT EXTERNAL CAROTID ARTERY D 8.59 cm/s    Right subclavian prox .0 cm/s    Right subclavian prox EDV 0.0 cm/s    Right vertebral sys 47.8 cm/s    RIGHT VERTEBRAL ARTERY D 16.80 cm/s    Right ICA/CCA sys 0.55     Left ICA/CCA sys 1.06        DUPLEX CAROTID BILATERAL   Final Result      NM BRAIN SCAN JAVAD SPECT   Final Result   1. Normal brain SPECT scintigraphy. CTA CODE NEURO HEAD AND NECK W CONT   Final Result      1.  No acute process. No large vessel occlusion, arterial dissection, or   flow-limiting stenosis. 2. CT perfusion not performed. If high clinical concern for acute process,   consider MRI (unless contraindicated). CT CODE NEURO HEAD WO CONTRAST   Final Result   No acute intracranial hemorrhage, mass or infarct. XR SPINAL PUNC LUMB DX    (Results Pending)        Assessment:    Altered mental status with extremity weakness, etiology unknown  -not likely CVA due to negative CT  -potentially hemiplegic migraine    Asymptomatic UTI  -positive nitrites and bacteria on UA    Potential hypothyroidism  -increased TSH    History of chronic headaches    History of asthma    Hyponatremia    Morbid obesity  -BMI 44.85    History of herpes    Discussion/MDM: Patient with multiple medical comorbidities, each with high likelihood for morbidity and mortality if left untreated. I have reviewed patient's presenting subjective and objective findings, as well as all laboratory studies, imaging studies, and vital signs to date as well as treatment rendered and patient's response to those treatments. In addition, prior medical, surgical and relevant social and family histories were reviewed. I have discussed management plan with patient/family and with nursing staff. Plan:  Discontinue Fioricet   Order Toradol and Solumedrol cocktail  Order Topamax for migraine maintenance  Neurology consult          Care Plan discussed with: Patient/Family    Code status: full code    Social determinants of health: none    Disposition/barriers to discharge: continue inpatient    Total time spent with patient:  25 minutes    [employer]   *ATTENTION:  This note has been created by a medical student for educational purposes only. Please do not refer to the content of this note for clinical decision-making, billing, or other purposes. Please see attending physicians note to obtain clinical information on this patient. *

## 2023-03-18 NOTE — PROGRESS NOTES
2320H: Patient complained of having asthma attack, upon initial assessment there is an inspiratory wheezing on both lower lung fields. Vital signs obtained. Dr. Gianna Bunch informed via perfect serve regarding patient's condition. New MD order made and carried out. 2415H: Patient verbalized improvement with breathing and is now more comfortable.

## 2023-03-19 VITALS
SYSTOLIC BLOOD PRESSURE: 123 MMHG | DIASTOLIC BLOOD PRESSURE: 83 MMHG | RESPIRATION RATE: 16 BRPM | HEIGHT: 68 IN | WEIGHT: 293 LBS | OXYGEN SATURATION: 100 % | HEART RATE: 72 BPM | BODY MASS INDEX: 44.41 KG/M2 | TEMPERATURE: 98.5 F

## 2023-03-19 LAB
BACTERIA SPEC CULT: NORMAL
COLONY COUNT,CNT: NORMAL
SPECIAL REQUESTS,SREQ: NORMAL

## 2023-03-19 PROCEDURE — 74011000258 HC RX REV CODE- 258: Performed by: INTERNAL MEDICINE

## 2023-03-19 PROCEDURE — 97530 THERAPEUTIC ACTIVITIES: CPT

## 2023-03-19 PROCEDURE — 97165 OT EVAL LOW COMPLEX 30 MIN: CPT

## 2023-03-19 PROCEDURE — 94640 AIRWAY INHALATION TREATMENT: CPT

## 2023-03-19 PROCEDURE — 74011250637 HC RX REV CODE- 250/637: Performed by: INTERNAL MEDICINE

## 2023-03-19 PROCEDURE — 74011250636 HC RX REV CODE- 250/636: Performed by: INTERNAL MEDICINE

## 2023-03-19 PROCEDURE — 96376 TX/PRO/DX INJ SAME DRUG ADON: CPT

## 2023-03-19 PROCEDURE — 97161 PT EVAL LOW COMPLEX 20 MIN: CPT

## 2023-03-19 PROCEDURE — G0378 HOSPITAL OBSERVATION PER HR: HCPCS

## 2023-03-19 PROCEDURE — 74011250636 HC RX REV CODE- 250/636: Performed by: PHYSICIAN ASSISTANT

## 2023-03-19 PROCEDURE — 97116 GAIT TRAINING THERAPY: CPT

## 2023-03-19 PROCEDURE — 74011000250 HC RX REV CODE- 250: Performed by: HOSPITALIST

## 2023-03-19 RX ORDER — TOPIRAMATE 25 MG/1
25 CAPSULE, COATED PELLETS ORAL EVERY 12 HOURS
Qty: 60 CAPSULE | Refills: 0 | Status: SHIPPED | OUTPATIENT
Start: 2023-03-19

## 2023-03-19 RX ORDER — SUMATRIPTAN 100 MG/1
100 TABLET, FILM COATED ORAL
Qty: 8 TABLET | Refills: 0 | Status: SHIPPED | OUTPATIENT
Start: 2023-03-19

## 2023-03-19 RX ADMIN — ACYCLOVIR SODIUM 900 MG: 1000 INJECTION, SOLUTION INTRAVENOUS at 07:38

## 2023-03-19 RX ADMIN — KETOROLAC TROMETHAMINE 30 MG: 30 INJECTION, SOLUTION INTRAMUSCULAR; INTRAVENOUS at 05:15

## 2023-03-19 RX ADMIN — FLUTICASONE PROPIONATE 2 PUFF: 110 AEROSOL, METERED RESPIRATORY (INHALATION) at 07:21

## 2023-03-19 RX ADMIN — TOPIRAMATE 25 MG: 25 CAPSULE, COATED PELLETS ORAL at 08:07

## 2023-03-19 RX ADMIN — METOCLOPRAMIDE 10 MG: 5 INJECTION, SOLUTION INTRAMUSCULAR; INTRAVENOUS at 02:05

## 2023-03-19 RX ADMIN — DIHYDROERGOTAMINE MESYLATE 0.5 MG: 1 INJECTION, SOLUTION INTRAMUSCULAR; INTRAVENOUS; SUBCUTANEOUS at 02:21

## 2023-03-19 NOTE — MED STUDENT NOTES
Physician Discharge Summary     Patient ID:    Destiney Ambrocio  [id number]  89 y.o.  1990    Admit date: 3/15/2023    Discharge date : 3/19/2023      Final Diagnoses: Altered mental status [R41.82]  Migraine headache [G43.909]      Reason for Hospitalization:    Patient is a 27 yo female with a PMH of asthma and chronic, recurrent headache was admitted from the ED on 3/15 for altered mental status and found on the floor unresponsive by her family. Patient had a similar episode in August 2021. It sounds like this was labeled as a stroke although was more likely a complicated migraine. She ended up having an outpatient MRI which reportedly did not show evidence for stroke. She reportedly had a headache prior to symptoms. Patient was reportedly talking very slow when found and hardly able to hear. Confirmed left arm weakness. Upon admission, patient condition improves but confirms photophobia. Level 2 stroke alert occurred. Labs upon admission show WBC 11.3, Hgb 11.4. Labs on 3/16 show TSH 5.79     CT of the head on 3/15 was normal.    Hospital Course:     Neurology consulted on 3/17 and ordered procalcitonin, lipid profile, echo, carotid duplex. UA on 3/17 shows positive nitrites and 1+ bacteria     Urine toxicology screen on 3/16 is positive for THC. On 3/17, patient was supposed to undergo MRI of the head, but was unable due to body habitus. Will do MRI outpatient. On 3/18, patient is seen for follow-up. Confirms left upper and lower extremity weakness. States numbness in extremity is better. Patient had 2 episodes of asthma attacks, and was given nebulizer treatment. Denies current dyspnea, cough, wheeze, abdominal pain, nausea, vomiting, chest pain. Echo results were normal.     Carotid duplex results were normal.     Blood culture is negative. On 3/19, patient is seen for follow-up.  Patient currently is upset due to losing phone in bed sheets that were sent for cleaning, and is concerned for losing her job. Patient states the weakness in her left upper and lower extremities have improved. Denies numbness in extremities. States the medication has been helping. Confirms headache, but states it is most likely due to the stress of losing her phone. Denies abdominal pain, nausea, vomiting, chest pain, dyspnea, cough, wheeze. Patient had one nebulizer treatment last night due to inability to breathe due to increased temperature in her room. No new labs were received. Patient states tele-neurology consulted on 3/18 and no documentation as been noted. Patient does not remember what the recommendations were by the neurologist.           Discharge Medications:   Current Discharge Medication List        START taking these medications    Details   topiramate (TOPAMAX) 25 mg sprinkle capsule Take 1 Capsule by mouth every twelve (12) hours. Qty: 60 Capsule, Refills: 0  Start date: 3/19/2023      SUMAtriptan (Imitrex) 100 mg tablet Take 1 Tablet by mouth daily as needed for Migraine. Qty: 8 Tablet, Refills: 0  Start date: 3/19/2023           CONTINUE these medications which have NOT CHANGED    Details   albuterol (PROVENTIL HFA, VENTOLIN HFA, PROAIR HFA) 90 mcg/actuation inhaler Take 2 Puffs by inhalation every four (4) hours as needed for Wheezing. Qty: 90 g, Refills: 0               Follow up Care:    1. Burak Ribeiro NP in 1-2 weeks. Please call to set up an appointment shortly after discharge. Diet:  Regular Diet    Disposition:  Home. Advanced Directive:   FULL x   DNR      Discharge Exam:  General:  Alert, cooperative, no distress, appears stated age. Lungs:   Clear to auscultation bilaterally. Chest wall:  No tenderness or deformity. Heart:  Regular rate and rhythm, S1, S2 normal, no murmur, click, rub or gallop. Abdomen:   Soft, non-tender. Bowel sounds normal. No masses,  No organomegaly.    Extremities: Extremities normal, atraumatic, no cyanosis or edema. Pulses: 2+ and symmetric all extremities. Skin: Skin color, texture, turgor normal. No rashes or lesions   Neurologic: CNII-XII intact. Strength 4/5 in left upper extremity and 3/5 in left lower extremity. CONSULTATIONS: Neurology    Significant Diagnostic Studies:   3/15/2023: BUN 20 mg/dL (Ref range: 6 - 20 mg/dL); Calcium 9.4 mg/dL (Ref range: 8.5 - 10.1 mg/dL); CO2 27 mmol/L (Ref range: 21 - 32 mmol/L); Creatinine 0.73 mg/dL (Ref range: 0.55 - 1.02 mg/dL); Glucose 83 mg/dL (Ref range: 65 - 100 mg/dL); HCT 35.1 % (Ref range: 35.0 - 47.0 %); HGB 11.4 g/dL (L; Ref range: 11.5 - 16.0 g/dL); Potassium 4.3 mmol/L (Ref range: 3.5 - 5.1 mmol/L); Sodium 138 mmol/L (Ref range: 136 - 145 mmol/L)  3/16/2023: BUN 17 mg/dL (Ref range: 6 - 20 mg/dL); Calcium 8.8 mg/dL (Ref range: 8.5 - 10.1 mg/dL); CO2 25 mmol/L (Ref range: 21 - 32 mmol/L); Creatinine 0.64 mg/dL (Ref range: 0.55 - 1.02 mg/dL); Glucose 92 mg/dL (Ref range: 65 - 100 mg/dL); Potassium 3.4 mmol/L (L; Ref range: 3.5 - 5.1 mmol/L); Sodium 138 mmol/L (Ref range: 136 - 145 mmol/L)  Recent Labs     03/17/23  0957   WBC 7.5   HGB 11.0*   HCT 33.9*        Recent Labs     03/17/23  0957   *   K 4.2      CO2 25   BUN 12   CREA 0.70   *   CA 8.8     Recent Labs     03/17/23  0957   ALT 20   AP 31*   TBILI 0.2   TP 7.0   ALB 3.0*   GLOB 4.0     No results for input(s): INR, PTP, APTT, INREXT in the last 72 hours. No results for input(s): FE, TIBC, PSAT, FERR in the last 72 hours. No results for input(s): PH, PCO2, PO2 in the last 72 hours. No results for input(s): CPK, CKMB in the last 72 hours.     No lab exists for component: TROPONINI  Lab Results   Component Value Date/Time    Glucose (POC) 83 03/15/2023 10:58 PM    Glucose (POC) 90 08/29/2021 07:48 AM       Discharge time spent 35 minutes    Signed:  Lupe Washington  3/19/2023  1:06 PM   *ATTENTION:  This note has been created by a medical student for educational purposes only. Please do not refer to the content of this note for clinical decision-making, billing, or other purposes. Please see attending physicians note to obtain clinical information on this patient. *

## 2023-03-19 NOTE — PROGRESS NOTES
[id number]: Started tele-neurologist consult with MD. Patient verbalized that her headache was gone after the medications were given to her. Nurse at bedside with the patient and her friend. Patient said it was okay to start the tele consult with friend at bedside. Nurse provided MD with medication list, CT and CTA result and latest set of vital signs.

## 2023-03-19 NOTE — PROGRESS NOTES
Patient called desk and stated phone was missing. Told Nurse Cyndy Glez. I ask him to get number of patient phone so we can call [employer] and ask them to call it to see if it is in the linen. Jennifer in [employer] and she called it and it went to voice mail three times. Jennifer advised me that she will let linen company know about missing phone. ( I Phone 11 with black Outter box #[phone]). 10:50 am  talk to patient and I let her know we was doing everything to try to find her phone and that if it went out with the line they will bring it back to us once they locate it. Patient stated that was her work phone and had to be with her. I let patient know once again we were trying to locate it. 14:40 pm:  This is patient personal phone number in case phone is found [phone].    15:45 pm: Patient being discharged wanted number to [facility]. I gave patient number to [employer]  so they can be in contact. I took down patient name and number to call if phone is found. Nurse, Cyndy Glez gave patient [employer] phone number.

## 2023-03-19 NOTE — PROGRESS NOTES
OCCUPATIONAL THERAPY EVALUATION  Patient: Samreen Yoon (3 y.o. female)  Date: 3/19/2023  Primary Diagnosis: Altered mental status [R41.82]  Migraine headache [G43.889]       Precautions: falls     In place during session: Peripheral IV    ASSESSMENT  Pt is a 28 y.o. female presenting to [facility] with c/o L sided weakness, migraines admitted 3/15/23 and currently being treated for AMS. Pt received semi-supine in bed upon arrival, AXO x4, and agreeable to OT evaluation. No acute intracranial hemorrhage, mass or infarct. Based on current observations, pt presents with deficits in generalized strength/AROM, bed mobility, static/dynamic sitting balance, static/dynamic standing balance (see PT note for gait details), functional activity tolerance decreased safety awareness currently impacting overall performance of ADLs and functional transfers/mobility (see below for objective details and assist levels). Pt sitting EOB, Min A for sit:Stand. Ambulated to sink with RW. Completed grooming standing at sink with SBA. Pt presents with decreased strength, fine motor coordination, and AROM in LUE limiting her with ADLs and functional transfers. Overall, pt tolerates session fair with RW. Pt would benefit from continued skilled OT services to address current impairments and improve IND and safety with self cares and functional transfers/mobility. Current OT d/c recommendation Inpatient Rehabilitation Facility  once medically appropriate. Current Level of Function Impacting Discharge: Other factors to consider for discharge: family/social support, DME, time since onset, severity of deficits, functional baseline     Patient will benefit from skilled therapy intervention to address the above noted impairments.        PLAN :  Recommendations and Planned Interventions: self care training, functional mobility training, therapeutic exercise, balance training, therapeutic activities, endurance activities, and neuromuscular re-education    Recommend with staff: Out of bed to chair for meals and Encourage HEP in prep for ADLs/mobility    Recommend next session: Toileting, UB dressing, LB dressing , Seated grooming, and Standing grooming    Frequency/Duration: Patient will be followed by occupational therapy:  3-5x/week to address goals. Recommendation for discharge: (in order for the patient to meet his/her long term goals)  [address]     This discharge recommendation:  Has been made in collaboration with the attending provider and/or case management    IF patient discharges home will need the following DME: shower chair       SUBJECTIVE:   Patient stated I want to go home.     OBJECTIVE DATA SUMMARY:   HISTORY:   Past Medical History:   Diagnosis Date    Asthma      History reviewed. No pertinent surgical history. Per patient:   Home Situation  Home Environment: Private residence  # Steps to Enter: 1  Rails to Enter: No  One/Two Story Residence: One story  Living Alone: No  Support Systems: Parent(s), Child(darien)  Patient Expects to be Discharged to[de-identified] Rehab hospital/unit acute  Current DME Used/Available at Home: None    Hand dominance: Right    EXAMINATION OF PERFORMANCE DEFICITS:  Cognitive/Behavioral Status:  Neurologic State: Alert  Orientation Level: Oriented X4  Cognition: Appropriate decision making; Appropriate for age attention/concentration; Appropriate safety awareness                 Hearing:   Auditory  Auditory Impairment: None    Vision/Perceptual:                                     Range of Motion:  AROM: Generally decreased, functional  PROM: Generally decreased, functional                      Strength:  Strength: Generally decreased, functional                Coordination:  Coordination: Generally decreased, functional  Fine Motor Skills-Upper: Left Impaired;Right Intact    Gross Motor Skills-Upper: Left Impaired;Right Intact    Tone & Sensation:     Sensation: Intact Balance:  Sitting: Intact; With support  Standing: Intact; With support    Functional Mobility and Transfers for ADLs:  Bed Mobility:  Scooting: Independent    Transfers:  Sit to Stand: Stand-by assistance  Stand to Sit: Stand-by assistance      ADL Intervention and task modifications:       Grooming  Washing Hands: Supervision;Stand-by assistance  Brushing Teeth: Supervision;Stand-by assistance                                  Therapeutic Exercise:  Pt will benefit from BUE HEP to improve participation in ADLs and mobility. Plan will be initiated at next session. Functional Measure:    [facility] AM-PACTM \"6 Clicks\"                                                       Daily Activity Inpatient Short Form  How much help from another person does the patient currently need. .. Total; A Lot A Little None   1. Putting on and taking off regular lower body clothing? []  1 []  2 [x]  3 []  4   2. Bathing (including washing, rinsing, drying)? []  1 []  2 [x]  3 []  4   3. Toileting, which includes using toilet, bedpan or urinal? [] 1 []  2 [x]  3 []  4   4. Putting on and taking off regular upper body clothing? []  1 []  2 [x]  3 []  4   5. Taking care of personal grooming such as brushing teeth? []  1 []  2 [x]  3 []  4   6. Eating meals? []  1 []  2 []  3 [x]  4   © 2007, Trustees of [facility], under license to [employer]. All rights reserved     Score: 19/24     Interpretation of Tool:  Represents clinically-significant functional categories (i.e. Activities of daily living).   Percentage of Impairment CH    0%   CI    1-19% CJ    20-39% CK    40-59% CL    60-79% CM    80-99% CN     100%   [facility]  Score 6-24 24 23 20-22 15-19 10-14 7-9 6     Occupational Therapy Evaluation Charge Determination   History Examination Decision-Making   LOW Complexity : Brief history review  LOW Complexity : 1-3 performance deficits relating to physical, cognitive , or psychosocial skils that result in activity limitations and / or participation restrictions  MEDIUM Complexity : Patient may present with comorbidities that affect occupational performnce. Miniml to moderate modification of tasks or assistance (eg, physical or verbal ) with assesment(s) is necessary to enable patient to complete evaluation       Based on the above components, the patient evaluation is determined to be of the following complexity level: LOW   Pain Ratin/10    Activity Tolerance:   Fair    After treatment patient left in no apparent distress:    Call bell within reach and Caregiver / family present, bed locked and in lowest position, sitting EOB    COMMUNICATION/EDUCATION:   The patients plan of care was discussed with: Physical therapist.     Patient/family have participated as able in goal setting and plan of care. and Patient/family agree to work toward stated goals and plan of care. This patients plan of care is appropriate for delegation to DANIELLE. PT/OT sessions occurred together for increased safety of pt and clinician. Thank you for this referral.  Estrellita White OT  Time Calculation: 29 mins   Problem: Self Care Deficits Care Plan (Adult)  Goal: *Acute Goals and Plan of Care (Insert Text)  Description: FUNCTIONAL STATUS PRIOR TO ADMISSION: Pt was IND With ADLS PTA. HOME SUPPORT: Lives with GF.     Occupational Therapy Goals  Initiated 3/19/2023    Pt stated goal: To be Ind  Pt will be Mod I sup <> sit in prep for EOB ADLs  Pt will be Mod I grooming standing sink side LRAD  Pt will be Mod I UB dressing sitting EOB/long sit   Pt will be Mod I LE dressing sitting EOB/long sit  Pt will be Mod I sit <>  prep for toileting LRAD  Pt will be Mod I toileting/toilet transfer/cloth mgmt LRAD  Pt will be Mod I following UE HEP in prep for self care tasks   Outcome: Not Met

## 2023-03-19 NOTE — DISCHARGE SUMMARY
Physician Discharge Summary     Patient ID:    Tram Segovia  [id number]  28 y.o.  1990    Admit date: 3/15/2023    Discharge date : 3/19/2023      Final Diagnoses:   Complicated and intractable migraine headache with left hemiplegia  Morbid obesity  History of asthma  Hyponatremia    Reason for Hospitalization:    Patient is a 29 yo female with a PMH of asthma and chronic, recurrent headache was admitted from the ED on 3/15 for altered mental status and found on the floor unresponsive by her family. Patient had a similar episode in August 2021. It sounds like this was labeled as a stroke although was more likely a complicated migraine. She ended up having an outpatient MRI which reportedly did not show evidence for stroke. She reportedly had a headache prior to symptoms. Patient was reportedly talking very slow when found and hardly able to hear. Confirmed left arm weakness. Upon admission, patient condition improves but confirms photophobia. Level 2 stroke alert occurred. Labs upon admission show WBC 11.3, Hgb 11.4. Labs on 3/16 show TSH 5.79     CT of the head on 3/15 was normal.    Hospital Course:     Neurology consulted on 3/17 and ordered procalcitonin, lipid profile, echo, carotid duplex. UA on 3/17 shows positive nitrites and 1+ bacteria     Urine toxicology screen on 3/16 is positive for THC. On 3/17, patient was supposed to undergo MRI of the head, but was unable due to body habitus. Will do MRI outpatient. On 3/18, patient is seen for follow-up. Confirms left upper and lower extremity weakness. States numbness in extremity is better. Patient had 2 episodes of asthma attacks, and was given nebulizer treatment. Denies current dyspnea, cough, wheeze, abdominal pain, nausea, vomiting, chest pain. Echo results were normal.     Carotid duplex results were normal.     Blood culture is negative.     Patient was treated with DHE 0.5 mg per protocol every 6 hours along with IV Toradol and Reglan. This seemed to help her headache. On 3/19, patient is seen for follow-up. Patient currently is upset due to losing phone in bed sheets that were sent for cleaning, and is concerned for losing her job. Patient states the weakness in her left upper and lower extremities have improved. Denies numbness in extremities. States the medication has been helping. Confirms headache, but states it is most likely due to the stress of losing her phone. Denies abdominal pain, nausea, vomiting, chest pain, dyspnea, cough, wheeze. Patient had one nebulizer treatment last night due to inability to breathe due to increased temperature in her room. No new labs were received. Patient states tele-neurology consulted on 3/18 and no documentation as been noted. Patient does not remember what the recommendations were by the neurologist.     Patient was felt appropriate for discharge home on 3/19. PT had recommended IRF but patient feels like she can do outpatient PT which is probably not unreasonable. We will continue her on Topamax at home and prescribe Imitrex as needed. I will have her follow-up with Dr. Ana Covington in the outpatient setting for chronic headache management      Discharge Medications:   Current Discharge Medication List        START taking these medications    Details   topiramate (TOPAMAX) 25 mg sprinkle capsule Take 1 Capsule by mouth every twelve (12) hours. Qty: 60 Capsule, Refills: 0  Start date: 3/19/2023      SUMAtriptan (Imitrex) 100 mg tablet Take 1 Tablet by mouth daily as needed for Migraine. Qty: 8 Tablet, Refills: 0  Start date: 3/19/2023           CONTINUE these medications which have NOT CHANGED    Details   albuterol (PROVENTIL HFA, VENTOLIN HFA, PROAIR HFA) 90 mcg/actuation inhaler Take 2 Puffs by inhalation every four (4) hours as needed for Wheezing. Qty: 90 g, Refills: 0               Follow up Care:    1. Vera Bumpers, NP in 1-2 weeks. Please call to set up an appointment shortly after discharge. Diet:  Regular Diet    Disposition:  Home. Advanced Directive:   FULL x   DNR      Discharge Exam:  General:  Alert, cooperative, no distress, appears stated age. Lungs:   Clear to auscultation bilaterally. Chest wall:  No tenderness or deformity. Heart:  Regular rate and rhythm, S1, S2 normal, no murmur, click, rub or gallop. Abdomen:   Soft, non-tender. Bowel sounds normal. No masses,  No organomegaly. Extremities: Extremities normal, atraumatic, no cyanosis or edema. Pulses: 2+ and symmetric all extremities. Skin: Skin color, texture, turgor normal. No rashes or lesions   Neurologic: CNII-XII intact. Strength 4/5 in left upper extremity and 3/5 in left lower extremity. CONSULTATIONS: Neurology    Significant Diagnostic Studies:   3/15/2023: BUN 20 mg/dL (Ref range: 6 - 20 mg/dL); Calcium 9.4 mg/dL (Ref range: 8.5 - 10.1 mg/dL); CO2 27 mmol/L (Ref range: 21 - 32 mmol/L); Creatinine 0.73 mg/dL (Ref range: 0.55 - 1.02 mg/dL); Glucose 83 mg/dL (Ref range: 65 - 100 mg/dL); HCT 35.1 % (Ref range: 35.0 - 47.0 %); HGB 11.4 g/dL (L; Ref range: 11.5 - 16.0 g/dL); Potassium 4.3 mmol/L (Ref range: 3.5 - 5.1 mmol/L); Sodium 138 mmol/L (Ref range: 136 - 145 mmol/L)  3/16/2023: BUN 17 mg/dL (Ref range: 6 - 20 mg/dL); Calcium 8.8 mg/dL (Ref range: 8.5 - 10.1 mg/dL); CO2 25 mmol/L (Ref range: 21 - 32 mmol/L); Creatinine 0.64 mg/dL (Ref range: 0.55 - 1.02 mg/dL); Glucose 92 mg/dL (Ref range: 65 - 100 mg/dL); Potassium 3.4 mmol/L (L; Ref range: 3.5 - 5.1 mmol/L);  Sodium 138 mmol/L (Ref range: 136 - 145 mmol/L)  Recent Labs     03/17/23  0957   WBC 7.5   HGB 11.0*   HCT 33.9*          Recent Labs     03/17/23  0957   *   K 4.2      CO2 25   BUN 12   CREA 0.70   *   CA 8.8       Recent Labs     03/17/23  0957   ALT 20   AP 31*   TBILI 0.2   TP 7.0   ALB 3.0*   GLOB 4.0       No results for input(s): INR, PTP, APTT, INREXT, INREXT in the last 72 hours. No results for input(s): FE, TIBC, PSAT, FERR in the last 72 hours. No results for input(s): PH, PCO2, PO2 in the last 72 hours. No results for input(s): CPK, CKMB in the last 72 hours. No lab exists for component: TROPONINI  Lab Results   Component Value Date/Time    Glucose (POC) 83 03/15/2023 10:58 PM    Glucose (POC) 90 08/29/2021 07:48 AM       Discharge time spent 35 minutes    Signed:  Kristy Og MD  3/19/2023  1:06 PM   *ATTENTION:  This note has been created by a medical student for educational purposes only. Please do not refer to the content of this note for clinical decision-making, billing, or other purposes. Please see attending physicians note to obtain clinical information on this patient. *

## 2023-03-19 NOTE — PROGRESS NOTES
Problem: Falls - Risk of  Goal: *Absence of Falls  Description: Document Noah Comment Fall Risk and appropriate interventions in the flowsheet.   Outcome: Progressing Towards Goal  Note: Fall Risk Interventions:  Mobility Interventions: Bed/chair exit alarm                             Problem: Pain  Goal: *Control of Pain  Outcome: Progressing Towards Goal

## 2023-03-19 NOTE — PROGRESS NOTES
IV removed. Discharge paperwork reviewed and signed with patient. Pt wheeled off of unit by staff to meet boyfriend at car. Pt provided [employer] phone number to communicate missing phone that she believes was thrown in dirty linen hamper last night. Pt opted to decline any type of inpatient Rehab facilities and had discharge orders in.

## 2023-03-19 NOTE — PROGRESS NOTES
PHYSICAL THERAPY EVALUATION  Patient: Allan Romo (43 y.o. female)  Date: 3/19/2023  Primary Diagnosis: Altered mental status [R41.82]  Migraine headache [G43.909]       Precautions: L weakness     In place during session: Peripheral IV    ASSESSMENT    Pt is a 28 y.o. female admitted on 3/15/2023 with a PMH of asthma and chronic, recurrent headache was admitted from the ED on 3/15 for altered mental status and found on the floor unresponsive by her family. Patient had a similar episode in August 2021. It sounds like this was labeled as a stroke although was more likely a complicated migraine. She ended up having an outpatient MRI which reportedly did not show evidence for stroke. She reportedly had a headache prior to symptoms. Patient was reportedly talking very slow when found and hardly able to hear. Confirmed left arm weakness. Upon admission, patient condition improves but confirms photophobia. Level 2 stroke alert occurred. Labs upon admission show WBC 11.3, Hgb 11.4. Labs on 3/16 show TSH 5.79   CT of the head on 3/15 was normal.    Pt was sitting at EOB upon PT arrival, agreeable to PT/OT evaluation. Pt A&O x 4. -Pt was upset about loosing her phone. Pt presents with difficulty lifting the L UE/LE but stated they are getting better and her sensation to light touch was intact. She presented with good static/dynamic sitting balance, able to dom her socks with her R UE only. Pt performed transfers with SBA and ambulated to/from bed to door 15 ft x2 with RW and cgx1 and L foot drop, pt required cues to place L UE on walker for stability, slight L side inattention noted. Pt vitals were stable. Based on the objective data described, the patient presents with generalized weakness, impaired functional mobility, impaired amb, impaired balance, and  L side UE/LE  weakness. (See below for objective details and assist levels). Overall pt tolerated session fairly well today.  Patient Pt will benefit from continued skilled PT to address above deficits and return to PLOF. Current PT DC recommendation Inpatient Rehabilitation Facility  once medically appropriate. Current Level of Function Impacting Discharge (mobility/balance): Ax1    Other factors to consider for discharge: continuation of L side weakness       PLAN :  Recommendations and Planned Interventions: bed mobility training, transfer training, gait training, therapeutic exercises, neuromuscular re-education, and edema management/control      Recommend for staff: Out of bed to chair for meals, Encourage HEP in prep for ADLs/mobility, and Amb to bathroom for toileting with gt belt and AD    Frequency/Duration: Patient will be followed by physical therapy:  3-5x/week to address goals. Recommendation for discharge: (in order for the patient to meet his/her long term goals)  [address]     This discharge recommendation:  Has been made in collaboration with the attending provider and/or case management    IF patient discharges home will need the following DME: quad cane and walker         SUBJECTIVE:   Patient stated I am feeling better just upset since my work phone is missing.     OBJECTIVE DATA SUMMARY:   HISTORY:    Past Medical History:   Diagnosis Date    Asthma    History reviewed. No pertinent surgical history. Home Situation  Home Environment: Private residence  # Steps to Enter: 1  Rails to Enter: No  One/Two Story Residence: One story  Living Alone: No  Support Systems: Parent(s), Child(darien)  Patient Expects to be Discharged to[de-identified] Rehab hospital/unit acute  Current DME Used/Available at Home: None    EXAMINATION/PRESENTATION/DECISION MAKING:   Critical Behavior:  Neurologic State: Alert  Orientation Level: Oriented X4  Cognition: Appropriate decision making, Appropriate for age attention/concentration, Appropriate safety awareness     Hearing:   Auditory  Auditory Impairment: None  Skin:  intact   Edema: B feet  Range Of Motion:  AROM: Generally decreased, functional           PROM: Generally decreased, functional           Strength:    Strength: Generally decreased, functional      L LE- hip flexion 2+/5, knee extension 3+/5, knee flexion 2+/5 , ankle DF 2+/5          LE 4/5 throughout      Tone & Sensation:   Tone: Abnormal              Sensation: Intact               Coordination:  Coordination: Generally decreased, functional  Vision:      Functional Mobility:  Bed Mobility:           Scooting: Independent  Transfers:  Sit to Stand: Stand-by assistance  Stand to Sit: Stand-by assistance                       Balance:   Sitting: Intact; With support  Standing: Intact; With support    Ambulation/Gait Training:  Distance (ft): 15 Feet (ft) (2)  Assistive Device: Walker (cues for L hand placement on walker)  Ambulation - Level of Assistance: Contact guard assistance     Gait Description (WDL): Exceptions to WDL  Gait Abnormalities: Foot drop; Step to gait; Decreased step clearance        Base of Support: Widened  Stance: Left decreased     Step Length: Left shortened         Therapeutic Exercises:   Educated on seated there-x     Functional Measure:  [facility] AM-PAC 6 Clicks         Basic Mobility Inpatient Short Form  How much difficulty does the patient currently have. .. Unable A Lot A Little None   1. Turning over in bed (including adjusting bedclothes, sheets and blankets)? [] 1   [] 2   [] 3   [x] 4   2. Sitting down on and standing up from a chair with arms ( e.g., wheelchair, bedside commode, etc.)   [] 1   [] 2   [] 3   [x] 4   3. Moving from lying on back to sitting on the side of the bed? [] 1   [] 2   [] 3   [x] 4          How much help from another person does the patient currently need. .. Total A Lot A Little None   4. Moving to and from a bed to a chair (including a wheelchair)? [] 1   [] 2   [] 3   [x] 4   5. Need to walk in hospital room? [] 1   [] 2   [x] 3   [] 4   6.   Climbing 3-5 steps with a railing? [] 1   [] 2   [x] 3   [] 4   © , Trustees of [employer], under license to [email or web address]. All rights reserved     Score:  Initial:  Most Recent: X (Date: 3/19/2023 )   Interpretation of Tool:  Represents activities that are increasingly more difficult (i.e. Bed mobility, Transfers, Gait). Score 24 23 22-20 19-15 14-10 9-7 6   Modifier CH CI CJ CK CL CM CN         Physical Therapy Evaluation Charge Determination   History Examination Presentation Decision-Making   MEDIUM  Complexity : 1-2 comorbidities / personal factors will impact the outcome/ POC  LOW Complexity : 1-2 Standardized tests and measures addressing body structure, function, activity limitation and / or participation in recreation  LOW Complexity : Stable, uncomplicated  Other outcome measures [facility] 6        Based on the above components, the patient evaluation is determined to be of the following complexity level: LOW     Pain Ratin/10     Activity Tolerance:   Good    After treatment patient left in no apparent distress:   Bed locked and in lowest position  Sitting at EOB  and board updated. GOALS:  FUNCTIONAL STATUS PRIOR TO ADMISSION: Patient was independent and active without use of DME.    HOME SUPPORT PRIOR TO ADMISSION: The patient lived with family but did not require assist.    Physical Therapy Goals  Initiated 3/19/2023  Pt stated goal: \"I want to go home and find my phone\"   Pt will be I with LE HEP in 7 days. Pt will perform bed mobility with I in 7 days. Pt will perform transfers with I in 7 days. Pt will amb 75 feet with LRAD safely with csv in 7 days. Pt will ascend/descend 4 steps with B handrails and CGA in 7 days to safely enter home. Pt will demonstrate improvement in dynamic  balance from cg to Mod I in 7 days. COMMUNICATION/EDUCATION:   The patients plan of care was discussed with: Physical therapist, Occupational therapist, and Registered nurse.      Fall prevention education was provided and the patient/caregiver indicated understanding. and Patient/family have participated as able in goal setting and plan of care.       Thank you for this referral.  Elenita Galvez, PT, DPT   Time Calculation: 22 mins

## 2023-03-19 NOTE — PROGRESS NOTES
Hospitalist Progress Note               Daily Progress Note: 3/19/2023      Subjective:   Hospital course to date:    Patient is a 29 yo female with a PMH of asthma and chronic, recurrent headache was admitted from the ED on 3/15 for altered mental status and found on the floor unresponsive by her family. Patient had a similar episode in August 2021. It sounds like this was labeled as a stroke although was more likely a complicated migraine. She ended up having an outpatient MRI which reportedly did not show evidence for stroke     She reportedly had a headache prior to symptoms. Patient was reportedly talking very slow when found and hardly able to hear. Confirmed left arm weakness. Upon admission, patient condition improves but confirms photophobia. Level 2 stroke alert occurred. Labs upon admission show WBC 11.3, Hgb 11.4. Labs on 3/16 show TSH 5.79    CT of the head on 3/15 was normal.    Neurology consulted on 3/17 and ordered procalcitonin, lipid profile, echo, carotid duplex. UA on 3/17 shows positive nitrites and 1+ bacteria    Urine toxicology screen is positive for THC. On 3/17, patient was supposed to undergo MRI of the head, but was unable due to body habitus. Will do MRI outpatient. On 3/18, patient is seen today for follow-up. Confirms left upper and lower extremity weakness. States numbness in extremity is better. Patient had 2 episodes of asthma attacks, and was given nebulizer treatment. Denies current dyspnea, cough, wheeze, abdominal pain, nausea, vomiting, chest pain. Echo results were normal.    Carotid duplex results were normal.    Blood culture is negative. Awaiting urine culture results. --------  Patient is seen today for follow-up. Patient currently is upset due to losing phone in bed sheets that were sent for cleaning, and is concerned for losing her job. Patient states the weakness in her left upper and lower extremities have improved.  Denies numbness in extremities. States the medication has been helping. Confirms headache. Denies abdominal pain, nausea, vomiting, chest pain, dyspnea, cough, wheeze. Patient had one nebulizer treatment last night due to inability to breathe due to increased temperature in her room. Patient states tele-neurology consulted yesterday and no documentation as been noted. Patient does not remember what the recommendations were by the neurologist.    No new labs today.     Problem List:  Problem List as of 3/19/2023 Date Reviewed: 3/16/2023            Codes Class Noted - Resolved    Altered mental status ICD-10-CM: R41.82  ICD-9-CM: 780.97  3/16/2023 - Present        Migraine headache ICD-10-CM: J53.143  ICD-9-CM: 346.90  3/16/2023 - Present        CVA (cerebral vascular accident) [facility]) ICD-10-CM: I63.9  ICD-9-CM: 434.91  8/26/2021 - Present       Medications reviewed  Current Facility-Administered Medications   Medication Dose Route Frequency    fluticasone (FLOVENT HFA) 110 mcg inhaler  2 Puff Inhalation BID RT    topiramate (TOPAMAX) sprinkle capsule 25 mg  25 mg Oral Q12H    metoclopramide HCl (REGLAN) injection 10 mg  10 mg IntraVENous Q6H PRN    acyclovir (ZOVIRAX) 900 mg in 0.9% sodium chloride 250 mL IVPB  900 mg IntraVENous Q8H    albuterol-ipratropium (DUO-NEB) 2.5 MG-0.5 MG/3 ML  3 mL Nebulization Q4H PRN    [Held by provider] aspirin chewable tablet 81 mg  81 mg Oral DAILY    acetaminophen (TYLENOL) tablet 650 mg  650 mg Oral Q4H PRN    Or    acetaminophen (TYLENOL) solution 650 mg  650 mg Per NG tube Q4H PRN    Or    acetaminophen (TYLENOL) suppository 650 mg  650 mg Rectal Q4H PRN    butalbital-acetaminophen-caffeine (FIORICET, ESGIC) -40 mg per tablet 2 Tablet  2 Tablet Oral Q6H PRN    ketorolac (TORADOL) injection 15 mg  15 mg IntraVENous Q6H PRN    hydrALAZINE (APRESOLINE) 20 mg/mL injection 20 mg  20 mg IntraVENous Q6H PRN    melatonin tablet 3 mg  3 mg Oral QHS PRN    ondansetron (ZOFRAN) injection 4 mg  4 mg IntraVENous Q6H PRN       Review of Systems:   A comprehensive review of systems was negative except for that written in the HPI. Objective:   Physical Exam:     Visit Vitals  /83 (BP 1 Location: Left upper arm, BP Patient Position: At rest)   Pulse 72   Temp 98.5 °F (36.9 °C)   Resp 16   Ht 5' 8\" (1.727 m)   Wt 133.8 kg (295 lb)   SpO2 100%   BMI 44.85 kg/m²      O2 Device: None (Room air)    Temp (24hrs), Av °F (36.7 °C), Min:97.6 °F (36.4 °C), Max:98.5 °F (36.9 °C)    No intake/output data recorded.  1901 -  0700  In: 600 [P.O.:600]  Out: 500 [Urine:500]    General:   Awake and alert   Lungs:   Clear to auscultation bilaterally. Chest wall:  No tenderness or deformity. Heart:  Regular rate and rhythm, S1, S2 normal, no murmur, click, rub or gallop. Abdomen:   Soft, non-tender. Bowel sounds normal. No masses,  No organomegaly. Extremities: Extremities normal, atraumatic, no cyanosis or edema. Pulses: 2+ and symmetric all extremities. Skin: Skin color, texture, turgor normal. No rashes or lesions   Neurologic: CN II-XII intact. Strength 4/5 for left upper extremities. Data Review:       Recent Days:  Recent Labs     23  0957   WBC 7.5   HGB 11.0*   HCT 33.9*          Recent Labs     23  0957   *   K 4.2      CO2 25   *   BUN 12   CREA 0.70   CA 8.8   ALB 3.0*   TBILI 0.2   ALT 20       No results for input(s): PH, PCO2, PO2, HCO3, FIO2 in the last 72 hours. 24 Hour Results:  No results found for this or any previous visit (from the past 24 hour(s)). DUPLEX CAROTID BILATERAL   Final Result      NM BRAIN SCAN JAVAD SPECT   Final Result   1. Normal brain SPECT scintigraphy. CTA CODE NEURO HEAD AND NECK W CONT   Final Result      1. No acute process. No large vessel occlusion, arterial dissection, or   flow-limiting stenosis. 2. CT perfusion not performed.   If high clinical concern for acute process,   consider MRI (unless contraindicated). CT CODE NEURO HEAD WO CONTRAST   Final Result   No acute intracranial hemorrhage, mass or infarct. XR SPINAL PUNC LUMB DX    (Results Pending)        Assessment:    Likely complicated migraine with left hemiplegia   -Highly doubt stroke- negative nuclear scan    Potential hypothyroidism  -increased TSH    History of chronic migraines    History of asthma    Hyponatremia    Morbid obesity  -BMI 44.85    History of herpes    Discussion/MDM: Patient with multiple medical comorbidities, each with high likelihood for morbidity and mortality if left untreated. I have reviewed patient's presenting subjective and objective findings, as well as all laboratory studies, imaging studies, and vital signs to date as well as treatment rendered and patient's response to those treatments. In addition, prior medical, surgical and relevant social and family histories were reviewed. I have discussed management plan with patient/family and with nursing staff. Plan:  Discontinue Fioricet   Continue DHE 0.5 mg every 6 hours x3 doses, preceded by Reglan 10 mg 30 minutes before each dose  Continue Toradol 30 mg IV every 6 hours x4 doses  Continue Topamax for migraine maintenance      Care Plan discussed with: Patient/Family    Code status: full code    Social determinants of health: none    Disposition/barriers to discharge: continue inpatient    Total time spent with patient:  25 minutes    [employer]   *ATTENTION:  This note has been created by a medical student for educational purposes only. Please do not refer to the content of this note for clinical decision-making, billing, or other purposes. Please see attending physicians note to obtain clinical information on this patient. *

## 2023-03-23 LAB
BACTERIA SPEC CULT: NORMAL
SPECIAL REQUESTS,SREQ: NORMAL

## 2023-05-19 RX ORDER — ALBUTEROL SULFATE 90 UG/1
2 AEROSOL, METERED RESPIRATORY (INHALATION) EVERY 4 HOURS PRN
COMMUNITY
Start: 2021-08-31

## 2023-05-19 RX ORDER — TOPIRAMATE 25 MG/1
25 CAPSULE, COATED PELLETS ORAL EVERY 12 HOURS
COMMUNITY
Start: 2023-03-19

## 2023-05-19 RX ORDER — SUMATRIPTAN 100 MG/1
100 TABLET, FILM COATED ORAL DAILY PRN
COMMUNITY
Start: 2023-03-19

## 2024-02-28 ENCOUNTER — OFFICE VISIT (OUTPATIENT)
Age: 34
End: 2024-02-28
Payer: COMMERCIAL

## 2024-02-28 VITALS
TEMPERATURE: 98.1 F | BODY MASS INDEX: 44.41 KG/M2 | HEIGHT: 68 IN | SYSTOLIC BLOOD PRESSURE: 132 MMHG | DIASTOLIC BLOOD PRESSURE: 82 MMHG | WEIGHT: 293 LBS | HEART RATE: 72 BPM | OXYGEN SATURATION: 97 % | RESPIRATION RATE: 16 BRPM

## 2024-02-28 DIAGNOSIS — R10.11 RIGHT UPPER QUADRANT ABDOMINAL PAIN: ICD-10-CM

## 2024-02-28 DIAGNOSIS — K80.20 SYMPTOMATIC CHOLELITHIASIS: Primary | ICD-10-CM

## 2024-02-28 DIAGNOSIS — K80.20 SYMPTOMATIC CHOLELITHIASIS: ICD-10-CM

## 2024-02-28 PROCEDURE — 99203 OFFICE O/P NEW LOW 30 MIN: CPT | Performed by: SURGERY

## 2024-02-28 ASSESSMENT — PATIENT HEALTH QUESTIONNAIRE - PHQ9
2. FEELING DOWN, DEPRESSED OR HOPELESS: 0
SUM OF ALL RESPONSES TO PHQ QUESTIONS 1-9: 0
SUM OF ALL RESPONSES TO PHQ QUESTIONS 1-9: 0
SUM OF ALL RESPONSES TO PHQ9 QUESTIONS 1 & 2: 0
1. LITTLE INTEREST OR PLEASURE IN DOING THINGS: 0
SUM OF ALL RESPONSES TO PHQ QUESTIONS 1-9: 0
SUM OF ALL RESPONSES TO PHQ QUESTIONS 1-9: 0

## 2024-02-28 NOTE — PROGRESS NOTES
Identified pt with two pt identifiers (name and ). Reviewed chart in preparation for visit and have obtained necessary documentation.    Irene Lombardi is a 34 y.o. female  Chief Complaint   Patient presents with    New Patient     Gallbladder sludge      /82 (Site: Left Upper Arm, Position: Sitting, Cuff Size: Large Adult)   Pulse 72   Temp 98.1 °F (36.7 °C) (Temporal)   Resp 16   Ht 1.727 m (5' 8\")   Wt (!) 143.8 kg (317 lb)   LMP 2024 (Approximate)   SpO2 97%   BMI 48.20 kg/m²     1. Have you been to the ER, urgent care clinic since your last visit?  Hospitalized since your last visit?no    2. Have you seen or consulted any other health care providers outside of the [facility] since your last visit?  Include any pap smears or colon screening. no

## 2024-02-28 NOTE — PROGRESS NOTES
[facility]  Dorene Mendoza MD  [address] [place] [address], [place] [address] [phone]      Patient Name: Irene Lombardi (34 y.o., female)    Patient Address: [address] [place] [id number]    PCP: Taniya Tsai APRN - NP     Patient contact numbers:  [unfilled] [unfilled]       Chief Complaint   Patient presents with    New Patient     Gallbladder sludge         History of Present Illness  This is a 34-year-old female who presented with abdominal pain.  Her symptoms began 02/172024 as excruiating abdominal pain located  in the epigastric region and RUQ, constant, 10/10, no alleviating and aggravating factors. [facility] and US showed sludge in gallbladder. Imaging done at care now at [facility].  Some has some nausea no vomiting  Pain Worse with meals especially fatty foods  Denies diarrhea and constipation  No blood per rectum, melena  No chest pain or shortness of breath  No fever or chills      Past Medical History:   Diagnosis Date    Asthma        Past surgical history:  Surgery on her tear ducts    Family history:  CAD  CVA  HTN  DM  Breast cancer    Social History     Tobacco Use    Smoking status: Former     Current packs/day: 0.00     Types: Cigarettes     Quit date: 7/23/2020     Years since quitting: 3.6    Smokeless tobacco: Never   Substance Use Topics    Alcohol use: Never    Drug use: Yes     Types: Marijuana (Weed)       No Known Allergies    Current Outpatient Medications   Medication Sig Dispense Refill    albuterol sulfate HFA (PROVENTIL;VENTOLIN;PROAIR) 108 (90 Base) MCG/ACT inhaler Inhale 2 puffs into the lungs every 4 hours as needed      SUMAtriptan (IMITREX) 100 MG tablet Take 1 tablet by mouth daily as needed (Patient not taking: Reported on 2/28/2024)      topiramate (TOPAMAX SPRINKLE) 25 MG capsule Take 1 capsule by mouth in the morning and 1 capsule in the

## 2024-02-29 ENCOUNTER — PREP FOR PROCEDURE (OUTPATIENT)
Age: 34
End: 2024-02-29

## 2024-02-29 ENCOUNTER — TELEPHONE (OUTPATIENT)
Age: 34
End: 2024-02-29

## 2024-02-29 DIAGNOSIS — K80.20 SYMPTOMATIC CHOLELITHIASIS: ICD-10-CM

## 2024-02-29 LAB
ALBUMIN SERPL-MCNC: 4 G/DL (ref 3.9–4.9)
ALP SERPL-CCNC: 34 IU/L (ref 44–121)
ALT SERPL-CCNC: 22 IU/L (ref 0–32)
AST SERPL-CCNC: 16 IU/L (ref 0–40)
BASOPHILS # BLD AUTO: 0.1 X10E3/UL (ref 0–0.2)
BASOPHILS NFR BLD AUTO: 1 %
BILIRUB DIRECT SERPL-MCNC: <0.1 MG/DL (ref 0–0.4)
BILIRUB SERPL-MCNC: <0.2 MG/DL (ref 0–1.2)
BUN SERPL-MCNC: 10 MG/DL (ref 6–20)
BUN/CREAT SERPL: 15 (ref 9–23)
CALCIUM SERPL-MCNC: 9.1 MG/DL (ref 8.7–10.2)
CHLORIDE SERPL-SCNC: 102 MMOL/L (ref 96–106)
CO2 SERPL-SCNC: 25 MMOL/L (ref 20–29)
CREAT SERPL-MCNC: 0.67 MG/DL (ref 0.57–1)
EGFRCR SERPLBLD CKD-EPI 2021: 117 ML/MIN/1.73
EOSINOPHIL # BLD AUTO: 0.3 X10E3/UL (ref 0–0.4)
EOSINOPHIL NFR BLD AUTO: 3 %
ERYTHROCYTE [DISTWIDTH] IN BLOOD BY AUTOMATED COUNT: 13.3 % (ref 11.7–15.4)
GLUCOSE SERPL-MCNC: 90 MG/DL (ref 70–99)
HCT VFR BLD AUTO: 37.7 % (ref 34–46.6)
HGB BLD-MCNC: 12.1 G/DL (ref 11.1–15.9)
IMM GRANULOCYTES # BLD AUTO: 0 X10E3/UL (ref 0–0.1)
IMM GRANULOCYTES NFR BLD AUTO: 0 %
LIPASE SERPL-CCNC: 13 U/L (ref 14–72)
LYMPHOCYTES # BLD AUTO: 3.2 X10E3/UL (ref 0.7–3.1)
LYMPHOCYTES NFR BLD AUTO: 31 %
MAGNESIUM SERPL-MCNC: 2.1 MG/DL (ref 1.6–2.3)
MCH RBC QN AUTO: 27.8 PG (ref 26.6–33)
MCHC RBC AUTO-ENTMCNC: 32.1 G/DL (ref 31.5–35.7)
MCV RBC AUTO: 87 FL (ref 79–97)
MONOCYTES # BLD AUTO: 0.8 X10E3/UL (ref 0.1–0.9)
MONOCYTES NFR BLD AUTO: 8 %
NEUTROPHILS # BLD AUTO: 5.9 X10E3/UL (ref 1.4–7)
NEUTROPHILS NFR BLD AUTO: 57 %
PHOSPHATE SERPL-MCNC: 3.3 MG/DL (ref 3–4.3)
PLATELET # BLD AUTO: 384 X10E3/UL (ref 150–450)
POTASSIUM SERPL-SCNC: 4.6 MMOL/L (ref 3.5–5.2)
PROT SERPL-MCNC: 7 G/DL (ref 6–8.5)
RBC # BLD AUTO: 4.35 X10E6/UL (ref 3.77–5.28)
SODIUM SERPL-SCNC: 138 MMOL/L (ref 134–144)
WBC # BLD AUTO: 10.2 X10E3/UL (ref 3.4–10.8)

## 2024-02-29 ASSESSMENT — ENCOUNTER SYMPTOMS
VOMITING: 0
ABDOMINAL DISTENTION: 0
CONSTIPATION: 0
ABDOMINAL PAIN: 1
ALLERGIC/IMMUNOLOGIC NEGATIVE: 1
ANAL BLEEDING: 0
RESPIRATORY NEGATIVE: 1
NAUSEA: 1
RECTAL PAIN: 0
BLOOD IN STOOL: 0
DIARRHEA: 0
EYES NEGATIVE: 1

## 2024-03-01 ENCOUNTER — HOSPITAL ENCOUNTER (OUTPATIENT)
Facility: HOSPITAL | Age: 34
End: 2024-03-01
Payer: COMMERCIAL

## 2024-03-01 LAB
EST. AVERAGE GLUCOSE BLD GHB EST-MCNC: 117 MG/DL
HBA1C MFR BLD: 5.7 % (ref 4–5.6)

## 2024-03-01 PROCEDURE — 36415 COLL VENOUS BLD VENIPUNCTURE: CPT

## 2024-03-01 PROCEDURE — 83036 HEMOGLOBIN GLYCOSYLATED A1C: CPT

## 2024-03-05 ENCOUNTER — ANESTHESIA EVENT (OUTPATIENT)
Facility: HOSPITAL | Age: 34
End: 2024-03-05
Payer: COMMERCIAL

## 2024-03-05 ENCOUNTER — HOSPITAL ENCOUNTER (OUTPATIENT)
Facility: HOSPITAL | Age: 34
Discharge: HOME | End: 2024-03-05
Attending: SURGERY | Admitting: SURGERY
Payer: COMMERCIAL

## 2024-03-05 ENCOUNTER — ANESTHESIA (OUTPATIENT)
Facility: HOSPITAL | Age: 34
End: 2024-03-05
Payer: COMMERCIAL

## 2024-03-05 VITALS
WEIGHT: 293 LBS | BODY MASS INDEX: 44.41 KG/M2 | HEART RATE: 80 BPM | RESPIRATION RATE: 18 BRPM | SYSTOLIC BLOOD PRESSURE: 138 MMHG | DIASTOLIC BLOOD PRESSURE: 94 MMHG | TEMPERATURE: 98.2 F | OXYGEN SATURATION: 100 % | HEIGHT: 68 IN

## 2024-03-05 DIAGNOSIS — K80.20 SYMPTOMATIC CHOLELITHIASIS: ICD-10-CM

## 2024-03-05 LAB
GLUCOSE BLD STRIP.AUTO-MCNC: 107 MG/DL (ref 65–100)
HCG UR QL: NEGATIVE
PERFORMED BY:: ABNORMAL

## 2024-03-05 PROCEDURE — 2720000010 HC SURG SUPPLY STERILE: Performed by: SURGERY

## 2024-03-05 PROCEDURE — 6360000002 HC RX W HCPCS: Performed by: SURGERY

## 2024-03-05 PROCEDURE — 7100000000 HC PACU RECOVERY - FIRST 15 MIN: Performed by: SURGERY

## 2024-03-05 PROCEDURE — 3700000000 HC ANESTHESIA ATTENDED CARE: Performed by: SURGERY

## 2024-03-05 PROCEDURE — 3700000001 HC ADD 15 MINUTES (ANESTHESIA): Performed by: SURGERY

## 2024-03-05 PROCEDURE — 6360000002 HC RX W HCPCS: Performed by: NURSE ANESTHETIST, CERTIFIED REGISTERED

## 2024-03-05 PROCEDURE — 47562 LAPAROSCOPIC CHOLECYSTECTOMY: CPT | Performed by: SURGERY

## 2024-03-05 PROCEDURE — 6360000002 HC RX W HCPCS: Performed by: ANESTHESIOLOGY

## 2024-03-05 PROCEDURE — 2580000003 HC RX 258: Performed by: SURGERY

## 2024-03-05 PROCEDURE — 2500000003 HC RX 250 WO HCPCS: Performed by: NURSE ANESTHETIST, CERTIFIED REGISTERED

## 2024-03-05 PROCEDURE — 3600000019 HC SURGERY ROBOT ADDTL 15MIN: Performed by: SURGERY

## 2024-03-05 PROCEDURE — 82962 GLUCOSE BLOOD TEST: CPT

## 2024-03-05 PROCEDURE — 6370000000 HC RX 637 (ALT 250 FOR IP): Performed by: ANESTHESIOLOGY

## 2024-03-05 PROCEDURE — 2709999900 HC NON-CHARGEABLE SUPPLY: Performed by: SURGERY

## 2024-03-05 PROCEDURE — 99024 POSTOP FOLLOW-UP VISIT: CPT | Performed by: SURGERY

## 2024-03-05 PROCEDURE — 81025 URINE PREGNANCY TEST: CPT

## 2024-03-05 PROCEDURE — 7100000011 HC PHASE II RECOVERY - ADDTL 15 MIN: Performed by: SURGERY

## 2024-03-05 PROCEDURE — 88304 TISSUE EXAM BY PATHOLOGIST: CPT

## 2024-03-05 PROCEDURE — S2900 ROBOTIC SURGICAL SYSTEM: HCPCS | Performed by: SURGERY

## 2024-03-05 PROCEDURE — C1889 IMPLANT/INSERT DEVICE, NOC: HCPCS | Performed by: SURGERY

## 2024-03-05 PROCEDURE — 7100000010 HC PHASE II RECOVERY - FIRST 15 MIN: Performed by: SURGERY

## 2024-03-05 PROCEDURE — 7100000001 HC PACU RECOVERY - ADDTL 15 MIN: Performed by: SURGERY

## 2024-03-05 PROCEDURE — 3600000009 HC SURGERY ROBOT BASE: Performed by: SURGERY

## 2024-03-05 PROCEDURE — 2580000003 HC RX 258: Performed by: NURSE ANESTHETIST, CERTIFIED REGISTERED

## 2024-03-05 DEVICE — CLIP INT M L POLYMER LOK LIG HEM O LOK: Type: IMPLANTABLE DEVICE | Site: BILE DUCT | Status: FUNCTIONAL

## 2024-03-05 RX ORDER — PROPOFOL 10 MG/ML
INJECTION, EMULSION INTRAVENOUS PRN
Status: DISCONTINUED | OUTPATIENT
Start: 2024-03-05 | End: 2024-03-05 | Stop reason: SDUPTHER

## 2024-03-05 RX ORDER — METOCLOPRAMIDE HYDROCHLORIDE 5 MG/ML
10 INJECTION INTRAMUSCULAR; INTRAVENOUS
Status: COMPLETED | OUTPATIENT
Start: 2024-03-05 | End: 2024-03-05

## 2024-03-05 RX ORDER — DEXTROSE MONOHYDRATE 100 MG/ML
INJECTION, SOLUTION INTRAVENOUS CONTINUOUS PRN
Status: DISCONTINUED | OUTPATIENT
Start: 2024-03-05 | End: 2024-03-05 | Stop reason: HOSPADM

## 2024-03-05 RX ORDER — SODIUM CHLORIDE, SODIUM LACTATE, POTASSIUM CHLORIDE, CALCIUM CHLORIDE 600; 310; 30; 20 MG/100ML; MG/100ML; MG/100ML; MG/100ML
INJECTION, SOLUTION INTRAVENOUS CONTINUOUS PRN
Status: DISCONTINUED | OUTPATIENT
Start: 2024-03-05 | End: 2024-03-05 | Stop reason: SDUPTHER

## 2024-03-05 RX ORDER — ACETAMINOPHEN 325 MG/1
650 TABLET ORAL EVERY 6 HOURS PRN
Qty: 120 TABLET | Refills: 3 | Status: SHIPPED | OUTPATIENT
Start: 2024-03-05

## 2024-03-05 RX ORDER — ONDANSETRON 2 MG/ML
INJECTION INTRAMUSCULAR; INTRAVENOUS PRN
Status: DISCONTINUED | OUTPATIENT
Start: 2024-03-05 | End: 2024-03-05 | Stop reason: SDUPTHER

## 2024-03-05 RX ORDER — LIDOCAINE HYDROCHLORIDE 20 MG/ML
INJECTION, SOLUTION EPIDURAL; INFILTRATION; INTRACAUDAL; PERINEURAL PRN
Status: DISCONTINUED | OUTPATIENT
Start: 2024-03-05 | End: 2024-03-05 | Stop reason: SDUPTHER

## 2024-03-05 RX ORDER — OXYCODONE HYDROCHLORIDE 5 MG/1
10 TABLET ORAL PRN
Status: COMPLETED | OUTPATIENT
Start: 2024-03-05 | End: 2024-03-05

## 2024-03-05 RX ORDER — LORAZEPAM 2 MG/ML
0.5 INJECTION INTRAMUSCULAR
Status: DISCONTINUED | OUTPATIENT
Start: 2024-03-05 | End: 2024-03-05 | Stop reason: HOSPADM

## 2024-03-05 RX ORDER — SODIUM CHLORIDE, SODIUM LACTATE, POTASSIUM CHLORIDE, CALCIUM CHLORIDE 600; 310; 30; 20 MG/100ML; MG/100ML; MG/100ML; MG/100ML
INJECTION, SOLUTION INTRAVENOUS ONCE
Status: DISCONTINUED | OUTPATIENT
Start: 2024-03-05 | End: 2024-03-05 | Stop reason: HOSPADM

## 2024-03-05 RX ORDER — KETAMINE HCL IN NACL, ISO-OSM 100MG/10ML
SYRINGE (ML) INJECTION PRN
Status: DISCONTINUED | OUTPATIENT
Start: 2024-03-05 | End: 2024-03-05 | Stop reason: SDUPTHER

## 2024-03-05 RX ORDER — ONDANSETRON 2 MG/ML
INJECTION INTRAMUSCULAR; INTRAVENOUS PRN
Status: DISCONTINUED | OUTPATIENT
Start: 2024-03-05 | End: 2024-03-05

## 2024-03-05 RX ORDER — HYDROMORPHONE HYDROCHLORIDE 1 MG/ML
0.5 INJECTION, SOLUTION INTRAMUSCULAR; INTRAVENOUS; SUBCUTANEOUS EVERY 5 MIN PRN
Status: DISCONTINUED | OUTPATIENT
Start: 2024-03-05 | End: 2024-03-05 | Stop reason: HOSPADM

## 2024-03-05 RX ORDER — SUCCINYLCHOLINE/SOD CL,ISO/PF 200MG/10ML
SYRINGE (ML) INTRAVENOUS PRN
Status: DISCONTINUED | OUTPATIENT
Start: 2024-03-05 | End: 2024-03-05 | Stop reason: SDUPTHER

## 2024-03-05 RX ORDER — OXYCODONE HYDROCHLORIDE 5 MG/1
5 TABLET ORAL PRN
Status: COMPLETED | OUTPATIENT
Start: 2024-03-05 | End: 2024-03-05

## 2024-03-05 RX ORDER — IBUPROFEN 200 MG
400 TABLET ORAL EVERY 6 HOURS PRN
Qty: 120 TABLET | Refills: 3 | Status: SHIPPED | OUTPATIENT
Start: 2024-03-05

## 2024-03-05 RX ORDER — DEXAMETHASONE SODIUM PHOSPHATE 4 MG/ML
INJECTION, SOLUTION INTRA-ARTICULAR; INTRALESIONAL; INTRAMUSCULAR; INTRAVENOUS; SOFT TISSUE PRN
Status: DISCONTINUED | OUTPATIENT
Start: 2024-03-05 | End: 2024-03-05 | Stop reason: SDUPTHER

## 2024-03-05 RX ORDER — CEFAZOLIN SODIUM 1 G/3ML
INJECTION, POWDER, FOR SOLUTION INTRAMUSCULAR; INTRAVENOUS
Status: DISCONTINUED
Start: 2024-03-05 | End: 2024-03-05 | Stop reason: HOSPADM

## 2024-03-05 RX ORDER — MAGNESIUM SULFATE HEPTAHYDRATE 40 MG/ML
INJECTION, SOLUTION INTRAVENOUS PRN
Status: DISCONTINUED | OUTPATIENT
Start: 2024-03-05 | End: 2024-03-05 | Stop reason: SDUPTHER

## 2024-03-05 RX ORDER — GLYCOPYRROLATE 0.2 MG/ML
INJECTION INTRAMUSCULAR; INTRAVENOUS PRN
Status: DISCONTINUED | OUTPATIENT
Start: 2024-03-05 | End: 2024-03-05 | Stop reason: SDUPTHER

## 2024-03-05 RX ORDER — LABETALOL HYDROCHLORIDE 5 MG/ML
10 INJECTION, SOLUTION INTRAVENOUS
Status: DISCONTINUED | OUTPATIENT
Start: 2024-03-05 | End: 2024-03-05 | Stop reason: HOSPADM

## 2024-03-05 RX ORDER — SODIUM CHLORIDE, SODIUM LACTATE, POTASSIUM CHLORIDE, CALCIUM CHLORIDE 600; 310; 30; 20 MG/100ML; MG/100ML; MG/100ML; MG/100ML
INJECTION, SOLUTION INTRAVENOUS CONTINUOUS
Status: DISCONTINUED | OUTPATIENT
Start: 2024-03-05 | End: 2024-03-05 | Stop reason: HOSPADM

## 2024-03-05 RX ORDER — SODIUM CHLORIDE 0.9 % (FLUSH) 0.9 %
5-40 SYRINGE (ML) INJECTION PRN
Status: DISCONTINUED | OUTPATIENT
Start: 2024-03-05 | End: 2024-03-05 | Stop reason: HOSPADM

## 2024-03-05 RX ORDER — SODIUM CHLORIDE 0.9 % (FLUSH) 0.9 %
5-40 SYRINGE (ML) INJECTION EVERY 12 HOURS SCHEDULED
Status: DISCONTINUED | OUTPATIENT
Start: 2024-03-05 | End: 2024-03-05 | Stop reason: HOSPADM

## 2024-03-05 RX ORDER — GLUCAGON 1 MG/ML
1 KIT INJECTION PRN
Status: DISCONTINUED | OUTPATIENT
Start: 2024-03-05 | End: 2024-03-05 | Stop reason: HOSPADM

## 2024-03-05 RX ORDER — DEXMEDETOMIDINE HYDROCHLORIDE 100 UG/ML
INJECTION, SOLUTION INTRAVENOUS PRN
Status: DISCONTINUED | OUTPATIENT
Start: 2024-03-05 | End: 2024-03-05 | Stop reason: SDUPTHER

## 2024-03-05 RX ORDER — OXYCODONE HYDROCHLORIDE 5 MG/1
5 TABLET ORAL EVERY 6 HOURS PRN
Qty: 20 TABLET | Refills: 0 | Status: SHIPPED | OUTPATIENT
Start: 2024-03-05 | End: 2024-03-12

## 2024-03-05 RX ORDER — BUPIVACAINE HYDROCHLORIDE 2.5 MG/ML
INJECTION, SOLUTION EPIDURAL; INFILTRATION; INTRACAUDAL PRN
Status: DISCONTINUED | OUTPATIENT
Start: 2024-03-05 | End: 2024-03-05 | Stop reason: ALTCHOICE

## 2024-03-05 RX ORDER — IPRATROPIUM BROMIDE AND ALBUTEROL SULFATE 2.5; .5 MG/3ML; MG/3ML
1 SOLUTION RESPIRATORY (INHALATION)
Status: DISCONTINUED | OUTPATIENT
Start: 2024-03-05 | End: 2024-03-05 | Stop reason: HOSPADM

## 2024-03-05 RX ORDER — SODIUM CHLORIDE 9 MG/ML
INJECTION, SOLUTION INTRAVENOUS PRN
Status: DISCONTINUED | OUTPATIENT
Start: 2024-03-05 | End: 2024-03-05 | Stop reason: HOSPADM

## 2024-03-05 RX ORDER — ONDANSETRON 2 MG/ML
4 INJECTION INTRAMUSCULAR; INTRAVENOUS
Status: COMPLETED | OUTPATIENT
Start: 2024-03-05 | End: 2024-03-05

## 2024-03-05 RX ORDER — MIDAZOLAM HYDROCHLORIDE 2 MG/2ML
INJECTION, SOLUTION INTRAMUSCULAR; INTRAVENOUS PRN
Status: DISCONTINUED | OUTPATIENT
Start: 2024-03-05 | End: 2024-03-05 | Stop reason: SDUPTHER

## 2024-03-05 RX ORDER — METRONIDAZOLE 500 MG/100ML
500 INJECTION, SOLUTION INTRAVENOUS ONCE
Status: COMPLETED | OUTPATIENT
Start: 2024-03-05 | End: 2024-03-05

## 2024-03-05 RX ORDER — FENTANYL CITRATE 50 UG/ML
50 INJECTION, SOLUTION INTRAMUSCULAR; INTRAVENOUS EVERY 5 MIN PRN
Status: DISCONTINUED | OUTPATIENT
Start: 2024-03-05 | End: 2024-03-05 | Stop reason: HOSPADM

## 2024-03-05 RX ORDER — FENTANYL CITRATE 50 UG/ML
INJECTION, SOLUTION INTRAMUSCULAR; INTRAVENOUS PRN
Status: DISCONTINUED | OUTPATIENT
Start: 2024-03-05 | End: 2024-03-05 | Stop reason: SDUPTHER

## 2024-03-05 RX ORDER — ROCURONIUM BROMIDE 10 MG/ML
INJECTION, SOLUTION INTRAVENOUS PRN
Status: DISCONTINUED | OUTPATIENT
Start: 2024-03-05 | End: 2024-03-05 | Stop reason: SDUPTHER

## 2024-03-05 RX ORDER — INDOCYANINE GREEN AND WATER 25 MG
5 KIT INJECTION
Status: DISCONTINUED | OUTPATIENT
Start: 2024-03-05 | End: 2024-03-05 | Stop reason: HOSPADM

## 2024-03-05 RX ORDER — HYDRALAZINE HYDROCHLORIDE 20 MG/ML
10 INJECTION INTRAMUSCULAR; INTRAVENOUS
Status: DISCONTINUED | OUTPATIENT
Start: 2024-03-05 | End: 2024-03-05 | Stop reason: HOSPADM

## 2024-03-05 RX ADMIN — GLYCOPYRROLATE 0.2 MG: 0.2 INJECTION INTRAMUSCULAR; INTRAVENOUS at 11:02

## 2024-03-05 RX ADMIN — Medication 10 MG: at 11:37

## 2024-03-05 RX ADMIN — FENTANYL CITRATE 100 MCG: 50 INJECTION, SOLUTION INTRAMUSCULAR; INTRAVENOUS at 10:28

## 2024-03-05 RX ADMIN — MIDAZOLAM HYDROCHLORIDE 2 MG: 1 INJECTION, SOLUTION INTRAMUSCULAR; INTRAVENOUS at 10:22

## 2024-03-05 RX ADMIN — METRONIDAZOLE 500 MG: 500 INJECTION, SOLUTION INTRAVENOUS at 10:09

## 2024-03-05 RX ADMIN — Medication 160 MG: at 10:28

## 2024-03-05 RX ADMIN — OXYCODONE 10 MG: 5 TABLET ORAL at 13:50

## 2024-03-05 RX ADMIN — DEXMEDETOMIDINE 4 MCG: 100 INJECTION, SOLUTION INTRAVENOUS at 12:12

## 2024-03-05 RX ADMIN — Medication 20 MG: at 12:00

## 2024-03-05 RX ADMIN — ROCURONIUM BROMIDE 20 MG: 10 INJECTION, SOLUTION INTRAVENOUS at 11:07

## 2024-03-05 RX ADMIN — DEXAMETHASONE SODIUM PHOSPHATE 8 MG: 4 INJECTION INTRA-ARTICULAR; INTRALESIONAL; INTRAMUSCULAR; INTRAVENOUS; SOFT TISSUE at 10:39

## 2024-03-05 RX ADMIN — ONDANSETRON 4 MG: 2 INJECTION INTRAMUSCULAR; INTRAVENOUS at 11:39

## 2024-03-05 RX ADMIN — METOCLOPRAMIDE 10 MG: 5 INJECTION, SOLUTION INTRAMUSCULAR; INTRAVENOUS at 13:11

## 2024-03-05 RX ADMIN — Medication 20 MG: at 10:41

## 2024-03-05 RX ADMIN — MAGNESIUM SULFATE HEPTAHYDRATE 2000 MG: 40 INJECTION, SOLUTION INTRAVENOUS at 10:48

## 2024-03-05 RX ADMIN — SODIUM CHLORIDE, POTASSIUM CHLORIDE, SODIUM LACTATE AND CALCIUM CHLORIDE: 600; 310; 30; 20 INJECTION, SOLUTION INTRAVENOUS at 10:22

## 2024-03-05 RX ADMIN — CEFAZOLIN SODIUM 3000 MG: 1 INJECTION, POWDER, FOR SOLUTION INTRAMUSCULAR; INTRAVENOUS at 10:22

## 2024-03-05 RX ADMIN — ROCURONIUM BROMIDE 10 MG: 10 INJECTION, SOLUTION INTRAVENOUS at 10:28

## 2024-03-05 RX ADMIN — DEXMEDETOMIDINE 8 MCG: 100 INJECTION, SOLUTION INTRAVENOUS at 11:06

## 2024-03-05 RX ADMIN — ONDANSETRON 4 MG: 2 INJECTION INTRAMUSCULAR; INTRAVENOUS at 12:38

## 2024-03-05 RX ADMIN — LIDOCAINE HYDROCHLORIDE 100 MG: 20 INJECTION, SOLUTION EPIDURAL; INFILTRATION; INTRACAUDAL; PERINEURAL at 10:28

## 2024-03-05 RX ADMIN — SUGAMMADEX 300 MG: 100 INJECTION, SOLUTION INTRAVENOUS at 12:08

## 2024-03-05 RX ADMIN — DEXMEDETOMIDINE 8 MCG: 100 INJECTION, SOLUTION INTRAVENOUS at 11:23

## 2024-03-05 RX ADMIN — PROPOFOL 200 MG: 10 INJECTION, EMULSION INTRAVENOUS at 10:28

## 2024-03-05 RX ADMIN — ROCURONIUM BROMIDE 40 MG: 10 INJECTION, SOLUTION INTRAVENOUS at 10:37

## 2024-03-05 ASSESSMENT — PAIN DESCRIPTION - LOCATION
LOCATION: ABDOMEN

## 2024-03-05 ASSESSMENT — PAIN SCALES - GENERAL
PAINLEVEL_OUTOF10: 10

## 2024-03-05 ASSESSMENT — PAIN DESCRIPTION - ORIENTATION: ORIENTATION: MID

## 2024-03-05 ASSESSMENT — LIFESTYLE VARIABLES: SMOKING_STATUS: 1

## 2024-03-05 ASSESSMENT — PAIN - FUNCTIONAL ASSESSMENT
PAIN_FUNCTIONAL_ASSESSMENT: 0-10
PAIN_FUNCTIONAL_ASSESSMENT: 0-10

## 2024-03-05 ASSESSMENT — PAIN DESCRIPTION - PAIN TYPE: TYPE: SURGICAL PAIN

## 2024-03-05 NOTE — ANESTHESIA PRE PROCEDURE
risks discussed with patient (and family, if present.).      Plan discussed with CRNA.                Ginny Paiz MD   3/5/2024

## 2024-03-05 NOTE — DISCHARGE INSTRUCTIONS
No Heavy Lifting greater than 10 pounds x 4 weeks  Diet as tolerated  No swimming or tab bathing x 3 weeks  OK to shower starting tomorrow

## 2024-03-05 NOTE — PERIOP NOTE
Patient alert and oriented x4, VS stable, no complaints of pain at time of discharge. Discharge instructions/education provided to friend, Levi Abel, he verbalized understanding and had no questions. Patient discharged in wheelchair at main entrance of hospital to home with friend via private vehicle.

## 2024-03-05 NOTE — H&P
General Surgery H&P        Chief Complaint   Patient presents with    New Patient       Gallbladder sludge          History of Present Illness  This is a 34-year-old female who presented with abdominal pain.  Her symptoms began 02/172024 as excruiating abdominal pain located  in the epigastric region and RUQ, constant, 10/10, no alleviating and aggravating factors. [facility] and US showed sludge in gallbladder. Imaging done at care now at [facility].  Some has some nausea no vomiting  Pain Worse with meals especially fatty foods  Denies diarrhea and constipation  No blood per rectum, melena  No chest pain or shortness of breath  No fever or chills        Past Medical History        Past Medical History:   Diagnosis Date    Asthma              Past surgical history:  Surgery on her tear ducts     Family history:  CAD  CVA  HTN  DM  Breast cancer     Social History            Tobacco Use    Smoking status: Former       Current packs/day: 0.00       Types: Cigarettes       Quit date: 7/23/2020       Years since quitting: 3.6    Smokeless tobacco: Never   Substance Use Topics    Alcohol use: Never    Drug use: Yes       Types: Marijuana (Weed)         No Known Allergies     Current Facility-Administered Medications          Current Outpatient Medications   Medication Sig Dispense Refill    albuterol sulfate HFA (PROVENTIL;VENTOLIN;PROAIR) 108 (90 Base) MCG/ACT inhaler Inhale 2 puffs into the lungs every 4 hours as needed        SUMAtriptan (IMITREX) 100 MG tablet Take 1 tablet by mouth daily as needed (Patient not taking: Reported on 2/28/2024)        topiramate (TOPAMAX SPRINKLE) 25 MG capsule Take 1 capsule by mouth in the morning and 1 capsule in the evening. (Patient not taking: Reported on 2/28/2024)          No current facility-administered medications for this visit.            Review of Systems   Constitutional: Negative.    HENT: Negative.     Eyes: Negative.    Respiratory:

## 2024-03-05 NOTE — ANESTHESIA POSTPROCEDURE EVALUATION
Department of Anesthesiology  Postprocedure Note    Patient: Irene Lombardi  MRN: [id number]  YOB: 1990  Date of evaluation: 3/5/2024    Procedure Summary       Date: 03/05/24 Room / Location: [facility] MAIN OR 06 / [facility] MAIN OR    Anesthesia Start: 1022 Anesthesia Stop: 1221    Procedure: ROBOTIC ASSISTED CHOLECYSTECTOMY (Abdomen) Diagnosis:       Symptomatic cholelithiasis      (Symptomatic cholelithiasis [K80.20])    Surgeons: Dorene Mendoza MD Responsible Provider: Ginny Paiz MD    Anesthesia Type: General ASA Status: 3            Anesthesia Type: General    Ponce Phase I: Ponce Score: 9    Ponce Phase II: Ponce Score: 9    Anesthesia Post Evaluation    Patient location during evaluation: PACU  Patient participation: complete - patient participated  Level of consciousness: awake  Airway patency: patent  Nausea & Vomiting: no nausea and no vomiting  Cardiovascular status: hemodynamically stable  Respiratory status: acceptable  Hydration status: euvolemic  Pain management: adequate    No notable events documented.

## 2024-03-06 ENCOUNTER — TELEPHONE (OUTPATIENT)
Age: 34
End: 2024-03-06

## 2024-03-06 DIAGNOSIS — K80.20 SYMPTOMATIC CHOLELITHIASIS: Primary | ICD-10-CM

## 2024-03-06 RX ORDER — KETOROLAC TROMETHAMINE 10 MG/1
10 TABLET, FILM COATED ORAL EVERY 6 HOURS PRN
Qty: 20 TABLET | Refills: 0 | Status: SHIPPED | OUTPATIENT
Start: 2024-03-06 | End: 2025-03-06

## 2024-03-06 NOTE — TELEPHONE ENCOUNTER
Patient stated that had gallbladder removal surgery with Dr. Mendoza and wants to talk to her about the medicine she was prescribed for pain, patient stated that they make her sleepy but that it is not helping with pain itself and that she's in so much pain she wakes during her sleep due to the pain     Advised would send message to medical staff.     call back number- [phone]

## 2024-03-06 NOTE — BRIEF OP NOTE
Brief Postoperative Note      Patient: Irene Lombardi  YOB: 1990  MRN: [id number]    Date of Procedure: 3/5/2024    Pre-Op Diagnosis Codes:     * Symptomatic cholelithiasis [K80.20]    Post-Op Diagnosis: Same       Procedure(s):  ROBOTIC ASSISTED CHOLECYSTECTOMY    Surgeon(s):  Dorene Mendoza MD    Assistant:  Surgical Assistant: Daniel Asif    Anesthesia: General    Estimated Blood Loss (mL): Minimal    Complications: None    Specimens:   ID Type Source Tests Collected by Time Destination   1 : Gallbladder Tissue Gallbladder SURGICAL PATHOLOGY Dorene Mendoza MD 3/5/2024 1139        Implants:  Implant Name Type Inv. Item Serial No.  Lot No. LRB No. Used Action   CLIP INT M L POLYMER ADRIAN LIG HEM O ADRIAN - SNA  CLIP INT M L POLYMER ADRIAN LIG HEM O ADRIAN [employer] [id number] N/A 3 Implanted         Drains: * No LDAs found *    Findings: as above      Electronically signed by Dorene Mendoza MD on 3/6/2024 at 4:40 AM

## 2024-03-07 NOTE — OP NOTE
Operative Note      Patient: Irene Lombardi  YOB: 1990  MRN: [id number]    Date of Procedure: 3/5/2024    Pre-Op Diagnosis Codes:     * Symptomatic cholelithiasis [K80.20]    Post-Op Diagnosis: Same       Procedure(s):  ROBOTIC ASSISTED CHOLECYSTECTOMY    Surgeon(s):  Dorene Mendoza MD    Assistant:   Surgical Assistant: Daniel Asif    Anesthesia: General    Estimated Blood Loss (mL): Minimal    Complications: None    Specimens:   ID Type Source Tests Collected by Time Destination   1 : Gallbladder Tissue Gallbladder SURGICAL PATHOLOGY Dorene Mendoza MD 3/5/2024 1139        Implants:  Implant Name Type Inv. Item Serial No.  Lot No. LRB No. Used Action   CLIP INT M L POLYMER ADRIAN LIG HEM O ADRIAN - SNA  CLIP INT M L POLYMER ADRIAN LIG HEM O ADRIAN [employer] [id number] N/A 3 Implanted         Drains: * No LDAs found *    Findings: see below        Detailed Description of Procedure:   The patient was brought operating room and placed in the supine position.  DVT prophylaxis was established by means of bilateral  pneumatic compression devices.  General anesthesia was induced.  IV antibiotics were given prior to incision.  Timeout was completed verifying correct patient, procedure, site, positioning, and special implants prior to beginning procedure.  The abdomen was prepped and draped in usual sterile fashion.      An 5mm incision was made above the umbilicus in the left abdomen.   Using optical trocar with laparoscope inserted, a 5 mm port was inserted under direct visualization.  Entry into the peritoneum was confirmed visually and no bowel was noted in the vicinity of the incision.    The abdomen was insufflated with CO2 to a pressure of 12 to 15 mmHg.  The patient tolerated insufflation well.  The laparoscope was inserted and the abdomen inspected.  No injuries from trocar placement were noted.    Additional trocars were then inserted in the following locations under direct

## 2024-03-19 ENCOUNTER — OFFICE VISIT (OUTPATIENT)
Age: 34
End: 2024-03-19

## 2024-03-19 VITALS
TEMPERATURE: 98.5 F | BODY MASS INDEX: 44.41 KG/M2 | WEIGHT: 293 LBS | DIASTOLIC BLOOD PRESSURE: 84 MMHG | HEIGHT: 68 IN | OXYGEN SATURATION: 98 % | HEART RATE: 87 BPM | SYSTOLIC BLOOD PRESSURE: 126 MMHG | RESPIRATION RATE: 14 BRPM

## 2024-03-19 DIAGNOSIS — R10.32 LEFT LOWER QUADRANT ABDOMINAL PAIN: ICD-10-CM

## 2024-03-19 DIAGNOSIS — Z90.49 S/P LAPAROSCOPIC CHOLECYSTECTOMY: ICD-10-CM

## 2024-03-19 DIAGNOSIS — R10.32 LEFT LOWER QUADRANT ABDOMINAL PAIN: Primary | ICD-10-CM

## 2024-03-19 PROCEDURE — 99024 POSTOP FOLLOW-UP VISIT: CPT | Performed by: SURGERY

## 2024-03-19 ASSESSMENT — PATIENT HEALTH QUESTIONNAIRE - PHQ9
SUM OF ALL RESPONSES TO PHQ QUESTIONS 1-9: 0
2. FEELING DOWN, DEPRESSED OR HOPELESS: NOT AT ALL
1. LITTLE INTEREST OR PLEASURE IN DOING THINGS: NOT AT ALL
SUM OF ALL RESPONSES TO PHQ9 QUESTIONS 1 & 2: 0

## 2024-03-19 NOTE — PROGRESS NOTES
Identified pt with two pt identifiers (name and ). Reviewed chart in preparation for visit and have obtained necessary documentation.    Irene Lombardi is a 34 y.o. female  Chief Complaint   Patient presents with    Follow-up     robotic assisted cholecystectomy     /84 (Site: Right Upper Arm, Position: Sitting, Cuff Size: Large Adult)   Pulse 87   Temp 98.5 °F (36.9 °C) (Oral)   Resp 14   Ht 1.727 m (5' 8\")   Wt (!) 144.3 kg (318 lb 3.2 oz)   LMP 2024 (Approximate)   SpO2 98%   BMI 48.38 kg/m²     1. Have you been to the ER, urgent care clinic since your last visit?  Hospitalized since your last visit?no    2. Have you seen or consulted any other health care providers outside of the [facility] since your last visit?  Include any pap smears or colon screening. no  
[facility]      Clinic Note - Follow up    Subjective     Irene Lombardi returns for scheduled follow up today.  She underwent robotic assisted cholecystectomy 3/5/2024. she is doing well except for LLQ pain began 5 days after surgery.  Currently patient denies pain in the left lower quadrant however she states that when she gets up and moves she does feel it in that area.  Infarumbilical numbness.  She denies nausea or vomiting.  She is tolerating regular diet.  She is having regular bowel movements.  She denies fever or chills.  She denies chest pain or shortness of breath.    Objective     /84 (Site: Right Upper Arm, Position: Sitting, Cuff Size: Large Adult)   Pulse 87   Temp 98.5 °F (36.9 °C) (Oral)   Resp 14   Ht 1.727 m (5' 8\")   Wt (!) 144.3 kg (318 lb 3.2 oz)   LMP 01/26/2024 (Approximate)   SpO2 98%   BMI 48.38 kg/m²       PE  GEN - Awake, alert, communicating appropriately.  NAD  Pulm - NWAB  CV - RRR  Abd - soft, mild tenderness to deep palpation of left lower quadrant, ND.  No rebound or guarding. incision sites healing well no erythema no drainage clinically I am unable to palpate any hernia at the 12 mm port site however this may be limited secondary patient's obesity.       FINAL PATHOLOGIC DIAGNOSIS          Gallbladder, cholecystectomy:        Mild chronic cholecystitis.     Assessment     Irene Lombardi is a 34 y.o.yr old female status post robotic assisted cholecystectomy    Plan     CT scan of the abdomen and pelvis to evaluate etiology of patient's left lower quadrant pain.  CBC, BMP, procalcitonin to workup patient's left lower quadrant pain  Follow-up again with me in 2 weeks      Dorene Mendoza MD  3/19/2024    20 mins of time was spent with the patient including reviewing chart, history and physical examination, reviewing labs and imaging and discussing treatment plan with patient.    
n/a

## 2024-03-22 LAB
BASOPHILS # BLD AUTO: 0.1 X10E3/UL (ref 0–0.2)
BASOPHILS NFR BLD AUTO: 1 %
BUN SERPL-MCNC: 11 MG/DL (ref 6–20)
BUN/CREAT SERPL: 14 (ref 9–23)
CALCIUM SERPL-MCNC: 9.4 MG/DL (ref 8.7–10.2)
CHLORIDE SERPL-SCNC: 102 MMOL/L (ref 96–106)
CO2 SERPL-SCNC: 24 MMOL/L (ref 20–29)
CREAT SERPL-MCNC: 0.77 MG/DL (ref 0.57–1)
EGFRCR SERPLBLD CKD-EPI 2021: 103 ML/MIN/1.73
EOSINOPHIL # BLD AUTO: 0.3 X10E3/UL (ref 0–0.4)
EOSINOPHIL NFR BLD AUTO: 3 %
ERYTHROCYTE [DISTWIDTH] IN BLOOD BY AUTOMATED COUNT: 13.3 % (ref 11.7–15.4)
GLUCOSE SERPL-MCNC: 91 MG/DL (ref 70–99)
HCT VFR BLD AUTO: 36.9 % (ref 34–46.6)
HGB BLD-MCNC: 12.1 G/DL (ref 11.1–15.9)
IMM GRANULOCYTES # BLD AUTO: 0 X10E3/UL (ref 0–0.1)
IMM GRANULOCYTES NFR BLD AUTO: 0 %
LYMPHOCYTES # BLD AUTO: 2.5 X10E3/UL (ref 0.7–3.1)
LYMPHOCYTES NFR BLD AUTO: 27 %
MCH RBC QN AUTO: 28.8 PG (ref 26.6–33)
MCHC RBC AUTO-ENTMCNC: 32.8 G/DL (ref 31.5–35.7)
MCV RBC AUTO: 88 FL (ref 79–97)
MONOCYTES # BLD AUTO: 0.8 X10E3/UL (ref 0.1–0.9)
MONOCYTES NFR BLD AUTO: 8 %
NEUTROPHILS # BLD AUTO: 5.4 X10E3/UL (ref 1.4–7)
NEUTROPHILS NFR BLD AUTO: 61 %
PLATELET # BLD AUTO: 391 X10E3/UL (ref 150–450)
POTASSIUM SERPL-SCNC: 4.8 MMOL/L (ref 3.5–5.2)
PROCALCITONIN SERPL-MCNC: <0.02 NG/ML (ref 0–0.08)
RBC # BLD AUTO: 4.2 X10E6/UL (ref 3.77–5.28)
SODIUM SERPL-SCNC: 139 MMOL/L (ref 134–144)
WBC # BLD AUTO: 9 X10E3/UL (ref 3.4–10.8)

## 2024-04-09 ENCOUNTER — HOSPITAL ENCOUNTER (OUTPATIENT)
Facility: HOSPITAL | Age: 34
Discharge: HOME | End: 2024-04-12
Attending: SURGERY
Payer: COMMERCIAL

## 2024-04-09 DIAGNOSIS — R10.32 LEFT LOWER QUADRANT ABDOMINAL PAIN: ICD-10-CM

## 2024-04-09 DIAGNOSIS — Z90.49 S/P LAPAROSCOPIC CHOLECYSTECTOMY: ICD-10-CM

## 2024-04-09 PROCEDURE — 6360000004 HC RX CONTRAST MEDICATION: Performed by: SURGERY

## 2024-04-09 PROCEDURE — 74177 CT ABD & PELVIS W/CONTRAST: CPT

## 2024-04-09 RX ADMIN — IOPAMIDOL 100 ML: 755 INJECTION, SOLUTION INTRAVENOUS at 11:28

## 2024-04-17 ENCOUNTER — HOSPITAL ENCOUNTER (EMERGENCY)
Facility: HOSPITAL | Age: 34
Discharge: HOME | End: 2024-04-17
Payer: COMMERCIAL

## 2024-04-17 ENCOUNTER — APPOINTMENT (OUTPATIENT)
Facility: HOSPITAL | Age: 34
End: 2024-04-17
Payer: COMMERCIAL

## 2024-04-17 VITALS
OXYGEN SATURATION: 100 % | HEIGHT: 68 IN | HEART RATE: 78 BPM | SYSTOLIC BLOOD PRESSURE: 149 MMHG | WEIGHT: 293 LBS | DIASTOLIC BLOOD PRESSURE: 100 MMHG | RESPIRATION RATE: 18 BRPM | BODY MASS INDEX: 44.41 KG/M2 | TEMPERATURE: 98.4 F

## 2024-04-17 DIAGNOSIS — M72.2 PLANTAR FASCIITIS OF LEFT FOOT: Primary | ICD-10-CM

## 2024-04-17 PROCEDURE — 96372 THER/PROPH/DIAG INJ SC/IM: CPT

## 2024-04-17 PROCEDURE — 99284 EMERGENCY DEPT VISIT MOD MDM: CPT

## 2024-04-17 PROCEDURE — 73630 X-RAY EXAM OF FOOT: CPT

## 2024-04-17 PROCEDURE — 6360000002 HC RX W HCPCS

## 2024-04-17 RX ORDER — KETOROLAC TROMETHAMINE 10 MG/1
10 TABLET, FILM COATED ORAL EVERY 6 HOURS PRN
Qty: 20 TABLET | Refills: 0 | Status: SHIPPED | OUTPATIENT
Start: 2024-04-17

## 2024-04-17 RX ORDER — KETOROLAC TROMETHAMINE 30 MG/ML
30 INJECTION, SOLUTION INTRAMUSCULAR; INTRAVENOUS
Status: COMPLETED | OUTPATIENT
Start: 2024-04-17 | End: 2024-04-17

## 2024-04-17 RX ADMIN — KETOROLAC TROMETHAMINE 30 MG: 30 INJECTION, SOLUTION INTRAMUSCULAR; INTRAVENOUS at 14:06

## 2024-04-17 ASSESSMENT — LIFESTYLE VARIABLES
HOW OFTEN DO YOU HAVE A DRINK CONTAINING ALCOHOL: NEVER
HOW MANY STANDARD DRINKS CONTAINING ALCOHOL DO YOU HAVE ON A TYPICAL DAY: PATIENT DOES NOT DRINK

## 2024-04-17 ASSESSMENT — PAIN SCALES - GENERAL
PAINLEVEL_OUTOF10: 8
PAINLEVEL_OUTOF10: 9

## 2024-04-17 ASSESSMENT — PAIN DESCRIPTION - DESCRIPTORS: DESCRIPTORS: ACHING

## 2024-04-17 ASSESSMENT — PAIN - FUNCTIONAL ASSESSMENT
PAIN_FUNCTIONAL_ASSESSMENT: 0-10
PAIN_FUNCTIONAL_ASSESSMENT: PREVENTS OR INTERFERES SOME ACTIVE ACTIVITIES AND ADLS

## 2024-04-17 ASSESSMENT — PAIN DESCRIPTION - ORIENTATION: ORIENTATION: LEFT

## 2024-04-17 ASSESSMENT — PAIN DESCRIPTION - LOCATION: LOCATION: FOOT

## 2024-04-17 NOTE — DISCHARGE INSTRUCTIONS
Thank you!  Thank you for allowing me to care for you in the emergency department. It is my goal to provide you with excellent care.  Please fill out the survey that will come to you by mail or email since we listen to your feedback!     Below you will find a list of your tests from today's visit.  Should you have any questions, please do not hesitate to call the emergency department.    Labs  No results found for this or any previous visit (from the past 12 hour(s)).    Radiologic Studies  XR FOOT LEFT (MIN 3 VIEWS)   Final Result   No acute abnormality. There is a punctate radiodensity along the   medial undersurface of the heel either on the skin or just subcutaneous.        ------------------------------------------------------------------------------------------------------------  The exam and treatment you received in the Emergency Department were for an urgent problem and are not intended as complete care. It is important that you follow-up with a doctor, nurse practitioner, or physician assistant to:  (1) confirm your diagnosis,  (2) re-evaluation of changes in your illness and treatment, and (3) for ongoing care. Please take your discharge instructions with you when you go to your follow-up appointment.     If you have any problem arranging a follow-up appointment, contact the Emergency Department.  If your symptoms become worse or you do not improve as expected and you are unable to reach your health care provider, please return to the Emergency Department. We are available 24 hours a day.     If a prescription has been provided, please have it filled as soon as possible to prevent a delay in treatment. If you have any questions or reservations about taking the medication due to side effects or interactions with other medications, please call your primary care provider or contact the ER.

## 2024-04-17 NOTE — ED PROVIDER NOTES
There was mention of a punctate radiopaque foreign body on the heel, likely on the surface of the skin, but this is not her area of tenderness.  I suspect she likely has plantar fasciitis, will discharge with short course of NSAIDs.  Discussed need for more supportive footwear.  Will give her some educational material regarding this matter.    SEPSIS Reassessment: Sepsis reassessment not applicable    Clinical Management Tools:  Not Applicable    Patient was given the following medications:  Medications - No data to display    CONSULTS: See ED Course/MDM for further details.  None     Social Determinants affecting Diagnosis/Treatment: None    Smoking Cessation: Not Applicable    PROCEDURES   Unless otherwise noted above, none.  Procedures      CRITICAL CARE TIME   Patient does not meet Critical Care Time, 0 minutes    ED IMPRESSION   No diagnosis found.      DISPOSITION/PLAN   DISPOSITION      Discharge Note: The patient is stable for discharge home. The signs, symptoms, diagnosis, and discharge instructions have been discussed, understanding conveyed, and agreed upon. The patient is to follow up as recommended or return to ER should their symptoms worsen.      PATIENT REFERRED TO:  No follow-up provider specified.      DISCHARGE MEDICATIONS:     Medication List        ASK your doctor about these medications      acetaminophen 325 MG tablet  Commonly known as: Aminofen  Take 2 tablets by mouth every 6 hours as needed for Pain (mild pain)     albuterol sulfate  (90 Base) MCG/ACT inhaler  Commonly known as: PROVENTIL;VENTOLIN;PROAIR     ibuprofen 200 MG tablet  Commonly known as: Advil  Take 2 tablets by mouth every 6 hours as needed for Pain (moderate pain)     ketorolac 10 MG tablet  Commonly known as: TORADOL  Take 1 tablet by mouth every 6 hours as needed for Pain     SUMAtriptan 100 MG tablet  Commonly known as: IMITREX     topiramate 25 MG capsule  Commonly known as: TOPAMAX SPRINKLE

## 2024-05-01 ENCOUNTER — TELEPHONE (OUTPATIENT)
Age: 34
End: 2024-05-01

## 2024-05-01 NOTE — TELEPHONE ENCOUNTER
Spoke with Dr.Adepoju mahmood to write note from 03/05/2024-04/30/2024. I called and spoke with Irene Lombardi 2 patient identifiers used. I walked the patient through [employer] so she could see her note and not have to come pick it up at the office.

## 2024-05-01 NOTE — TELEPHONE ENCOUNTER
Patient called today regarding work note. She needs a letter from Dr. Mendoza stating she was out for 4 weeks unable to lift over 10 pounds.  [phone]

## 2024-08-25 ENCOUNTER — APPOINTMENT (OUTPATIENT)
Facility: HOSPITAL | Age: 34
End: 2024-08-25
Payer: COMMERCIAL

## 2024-08-25 ENCOUNTER — HOSPITAL ENCOUNTER (EMERGENCY)
Facility: HOSPITAL | Age: 34
Discharge: HOME | End: 2024-08-25
Attending: EMERGENCY MEDICINE
Payer: COMMERCIAL

## 2024-08-25 VITALS
HEIGHT: 68 IN | OXYGEN SATURATION: 99 % | TEMPERATURE: 98.5 F | DIASTOLIC BLOOD PRESSURE: 77 MMHG | RESPIRATION RATE: 16 BRPM | HEART RATE: 81 BPM | WEIGHT: 293 LBS | SYSTOLIC BLOOD PRESSURE: 126 MMHG | BODY MASS INDEX: 44.41 KG/M2

## 2024-08-25 DIAGNOSIS — S62.357A CLOSED NONDISPLACED FRACTURE OF SHAFT OF FIFTH METACARPAL BONE OF LEFT HAND, INITIAL ENCOUNTER: Primary | ICD-10-CM

## 2024-08-25 PROCEDURE — 29125 APPL SHORT ARM SPLINT STATIC: CPT

## 2024-08-25 PROCEDURE — 99283 EMERGENCY DEPT VISIT LOW MDM: CPT

## 2024-08-25 PROCEDURE — 6370000000 HC RX 637 (ALT 250 FOR IP): Performed by: EMERGENCY MEDICINE

## 2024-08-25 PROCEDURE — 73130 X-RAY EXAM OF HAND: CPT

## 2024-08-25 PROCEDURE — 73110 X-RAY EXAM OF WRIST: CPT

## 2024-08-25 RX ORDER — HYDROCODONE BITARTRATE AND ACETAMINOPHEN 5; 325 MG/1; MG/1
1 TABLET ORAL EVERY 6 HOURS PRN
Qty: 12 TABLET | Refills: 0 | Status: SHIPPED | OUTPATIENT
Start: 2024-08-25 | End: 2024-08-28

## 2024-08-25 RX ORDER — HYDROCODONE BITARTRATE AND ACETAMINOPHEN 5; 325 MG/1; MG/1
1 TABLET ORAL
Status: COMPLETED | OUTPATIENT
Start: 2024-08-25 | End: 2024-08-25

## 2024-08-25 RX ADMIN — HYDROCODONE BITARTRATE AND ACETAMINOPHEN 1 TABLET: 5; 325 TABLET ORAL at 22:14

## 2024-08-25 ASSESSMENT — PAIN - FUNCTIONAL ASSESSMENT: PAIN_FUNCTIONAL_ASSESSMENT: 0-10

## 2024-08-25 ASSESSMENT — LIFESTYLE VARIABLES
HOW MANY STANDARD DRINKS CONTAINING ALCOHOL DO YOU HAVE ON A TYPICAL DAY: PATIENT DOES NOT DRINK
HOW OFTEN DO YOU HAVE A DRINK CONTAINING ALCOHOL: NEVER

## 2024-08-25 ASSESSMENT — PAIN DESCRIPTION - ORIENTATION: ORIENTATION: LEFT

## 2024-08-25 ASSESSMENT — PAIN DESCRIPTION - LOCATION: LOCATION: FINGER (COMMENT WHICH ONE)

## 2024-08-25 ASSESSMENT — PAIN SCALES - GENERAL: PAINLEVEL_OUTOF10: 9

## 2024-08-26 NOTE — ED PROVIDER NOTES
tablets by mouth every 6 hours as needed for Pain (mild pain) 120 tablet 3    ibuprofen (ADVIL) 200 MG tablet Take 2 tablets by mouth every 6 hours as needed for Pain (moderate pain) 120 tablet 3    albuterol sulfate HFA (PROVENTIL;VENTOLIN;PROAIR) 108 (90 Base) MCG/ACT inhaler Inhale 2 puffs into the lungs every 4 hours as needed      SUMAtriptan (IMITREX) 100 MG tablet Take 1 tablet by mouth daily as needed (Patient not taking: Reported on 2/28/2024)      topiramate (TOPAMAX SPRINKLE) 25 MG capsule Take 1 capsule by mouth in the morning and 1 capsule in the evening. (Patient not taking: Reported on 2/28/2024)         Social Determinants of Health:   Social Determinants of Health     Tobacco Use: High Risk (8/25/2024)    Patient History     Smoking Tobacco Use: Every Day     Smokeless Tobacco Use: Never     Passive Exposure: Not on file   Alcohol Use: Not At Risk (8/25/2024)    AUDIT-C     Frequency of Alcohol Consumption: Never     Average Number of Drinks: Patient does not drink     Frequency of Binge Drinking: Never   Financial Resource Strain: Not on file   Food Insecurity: Not on file   Transportation Needs: Not on file   Physical Activity: Not on file   Stress: Not on file   Social Connections: Not on file   Intimate Partner Violence: Not on file   Depression: Not at risk (3/19/2024)    PHQ-2     PHQ-2 Score: 0   Housing Stability: Not on file   Interpersonal Safety: Not At Risk (8/25/2024)    Interpersonal Safety Domain Source: IP Abuse Screening     Physical abuse: Denies     Verbal abuse: Denies     Emotional abuse: Denies     Financial abuse: Denies     Sexual abuse: Denies   Utilities: Not on file       PHYSICAL EXAM   Physical Exam  Constitutional:       General: She is not in acute distress.     Appearance: Normal appearance.   HENT:      Head: Normocephalic and atraumatic.      Right Ear: External ear normal.      Left Ear: External ear normal.      Nose: Nose normal. No congestion or rhinorrhea.       Mouth/Throat:      Mouth: Mucous membranes are moist.   Eyes:      Extraocular Movements: Extraocular movements intact.      Conjunctiva/sclera: Conjunctivae normal.      Pupils: Pupils are equal, round, and reactive to light.   Cardiovascular:      Rate and Rhythm: Normal rate and regular rhythm.      Pulses: Normal pulses.   Pulmonary:      Effort: Pulmonary effort is normal. No respiratory distress.   Abdominal:      General: Abdomen is flat. There is no distension.      Tenderness: There is no abdominal tenderness.   Musculoskeletal:         General: Swelling, tenderness, deformity and signs of injury present. Normal range of motion.      Cervical back: Normal range of motion.      Comments: Left hand with swelling at the fourth and fifth metacarpal more on the dorsum.  2+ radial pulse good cap refill.  Slight wrist swelling.   Skin:     General: Skin is warm.      Coloration: Skin is not jaundiced or pale.   Neurological:      General: No focal deficit present.      Mental Status: She is alert and oriented to person, place, and time. Mental status is at baseline.   Psychiatric:         Mood and Affect: Mood normal.         Behavior: Behavior normal.         Thought Content: Thought content normal.         Judgment: Judgment normal.           SCREENINGS                No data recorded    LAB, EKG AND DIAGNOSTIC RESULTS   Labs:  No results found for this or any previous visit (from the past 12 hour(s)).    EKG:.Not Applicable    Radiologic Studies:  Non-plain film images such as CT, Ultrasound and MRI are read by the radiologist. Plain radiographic images are visualized and preliminarily interpreted by the ED Provider with the following findings: See ED Course Below    Interpretation per the Radiologist below, if available at the time of this note:  XR HAND LEFT (MIN 3 VIEWS)   Final Result   Acute spiral fracture fifth metacarpal shaft.         Electronically signed by Frank Nunez      XR WRIST LEFT (MIN 3

## 2024-08-26 NOTE — ED TRIAGE NOTES
States she hit her left hand on the frame of a door and now has pain in the fifth digit and in the hand just under the fifth digit. No swelling or deformity noted.

## 2024-08-26 NOTE — DISCHARGE INSTRUCTIONS
Thank you for choosing our Emergency Department for your care.  It is our privilege to care for you in your time of need.  In the next several days, you may receive a survey via email or mailed to your home about your experience with our team.  We would greatly appreciate you taking a few minutes to complete the survey, as we use this information to learn what we have done well and what we could be doing better. Thank you for trusting us with your care!    Below you will find a list of your tests from today's visit.   Labs  No results found for this or any previous visit (from the past 12 hour(s)).    Radiologic Studies  XR HAND LEFT (MIN 3 VIEWS)   Final Result   Acute spiral fracture fifth metacarpal shaft.         Electronically signed by Frank Nunez      XR WRIST LEFT (MIN 3 VIEWS)   Final Result   Acute spiral fracture fifth metacarpal shaft.         Electronically signed by Frank Nunez        ------------------------------------------------------------------------------------------------------------  The evaluation and treatment you received in the Emergency Department were for an urgent problem. It is important that you follow-up with a doctor, nurse practitioner, or physician assistant to:  (1) confirm your diagnosis,  (2) re-evaluation of changes in your illness and treatment, and (3) for ongoing care. Please take your discharge instructions with you when you go to your follow-up appointment.     If you have any problem arranging a follow-up appointment, contact us!  If your symptoms become worse or you do not improve as expected, please return to us. We are available 24 hours a day.     If a prescription has been provided, please fill it as soon as possible to prevent a delay in treatment. If you have any questions or reservations about taking the medication due to side effects or interactions with other medications, please call your primary care provider or contact us directly.  Again, THANK YOU for  choosing us to care for YOU!

## 2024-08-27 ENCOUNTER — OFFICE VISIT (OUTPATIENT)
Age: 34
End: 2024-08-27
Payer: COMMERCIAL

## 2024-08-27 VITALS
DIASTOLIC BLOOD PRESSURE: 85 MMHG | RESPIRATION RATE: 16 BRPM | OXYGEN SATURATION: 99 % | BODY MASS INDEX: 44.41 KG/M2 | HEIGHT: 68 IN | SYSTOLIC BLOOD PRESSURE: 138 MMHG | TEMPERATURE: 97.5 F | WEIGHT: 293 LBS | HEART RATE: 67 BPM

## 2024-08-27 DIAGNOSIS — S62.357A CLOSED NONDISPLACED FRACTURE OF SHAFT OF FIFTH METACARPAL BONE OF LEFT HAND, INITIAL ENCOUNTER: Primary | ICD-10-CM

## 2024-08-27 DIAGNOSIS — S63.697A OTHER SPRAIN OF LEFT LITTLE FINGER, INITIAL ENCOUNTER: ICD-10-CM

## 2024-08-27 PROCEDURE — 99203 OFFICE O/P NEW LOW 30 MIN: CPT | Performed by: ORTHOPAEDIC SURGERY

## 2024-08-27 NOTE — PROGRESS NOTES
Subjective:      Patient ID: Irene Lombardi is a 34 y.o.  female.    Chief Complaint   Patient presents with    Fracture   The patient is a 34-year-old , who presents today for reevaluation of a nondisplaced left fifth metacarpal shaft fracture sustained at home 2 days ago on 8/25/2024 at home when she tripped in her hallway and accidentally struck her left nondominant hand against a door jam.  X-rays in the emergency department showed a nondisplaced spiral fracture to the fifth metacarpal and no other fractures noted.  She was placed in a ulnar gutter splint.  She denies any paresthesias or weakness in the fingertips.    HPI from [facility] emergency department 8/25/2024, Nancy Parmar MD: Irene Lombardi is a 34 y.o. female patient suffered a trip and fall impacting her left hand on the wall causing her finger to bend backward.  Having pain at the fourth and fifth digit.  Swelling present.  Some pain that goes into the wrist as well.  No pain meds   Social History     Occupational History    Not on file   Tobacco Use    Smoking status: Every Day     Types: Cigarettes    Smokeless tobacco: Never   Vaping Use    Vaping status: Never Used   Substance and Sexual Activity    Alcohol use: Never    Drug use: Yes     Types: Marijuana (Weed)    Sexual activity: Not on file      Past Medical History:   Diagnosis Date    Asthma      Migraines              Past Surgical History:  Past Surgical History         Past Surgical History:   Procedure Laterality Date    CHOLECYSTECTOMY, LAPAROSCOPIC N/A 3/5/2024     ROBOTIC ASSISTED CHOLECYSTECTOMY performed by Dorene Mendoza MD at [facility] MAIN OR       Family History:  Family History         Family History   Problem Relation Age of Onset    Heart Disease Mother      Stroke Mother      Hypertension Mother      Hypertension Father      Seizures Father      Stroke Father           Current Outpatient Medications   Medication Sig Dispense Refill    ketorolac  No

## 2024-08-27 NOTE — PROGRESS NOTES
Chief Complaint   Patient presents with    Fracture     /85   Pulse 67   Temp 97.5 °F (36.4 °C)   Resp 16   Ht 1.727 m (5' 8\")   Wt (!) 141.8 kg (312 lb 9.8 oz)   LMP 07/27/2024   SpO2 99%   BMI 47.53 kg/m²   1. Have you been to the ER, urgent care clinic since your last visit?  Hospitalized since your last visit?No    2. Have you seen or consulted any other health care providers outside of the [facility] since your last visit?  Include any pap smears or colon screening. No

## 2024-09-09 ENCOUNTER — HOSPITAL ENCOUNTER (EMERGENCY)
Facility: HOSPITAL | Age: 34
Discharge: HOME | End: 2024-09-09
Payer: COMMERCIAL

## 2024-09-09 VITALS
HEART RATE: 64 BPM | RESPIRATION RATE: 18 BRPM | TEMPERATURE: 98.4 F | HEIGHT: 68 IN | OXYGEN SATURATION: 97 % | DIASTOLIC BLOOD PRESSURE: 83 MMHG | SYSTOLIC BLOOD PRESSURE: 137 MMHG | BODY MASS INDEX: 44.41 KG/M2 | WEIGHT: 293 LBS

## 2024-09-09 DIAGNOSIS — S62.397D CLOSED NONDISPLACED FRACTURE OF OTHER PART OF FIFTH METACARPAL BONE OF LEFT HAND WITH ROUTINE HEALING, SUBSEQUENT ENCOUNTER: Primary | ICD-10-CM

## 2024-09-09 PROCEDURE — 99282 EMERGENCY DEPT VISIT SF MDM: CPT

## 2024-09-09 ASSESSMENT — PAIN - FUNCTIONAL ASSESSMENT: PAIN_FUNCTIONAL_ASSESSMENT: 0-10

## 2024-09-09 ASSESSMENT — PAIN DESCRIPTION - ORIENTATION: ORIENTATION: LEFT

## 2024-09-09 ASSESSMENT — LIFESTYLE VARIABLES
HOW OFTEN DO YOU HAVE A DRINK CONTAINING ALCOHOL: 2-4 TIMES A MONTH
HOW MANY STANDARD DRINKS CONTAINING ALCOHOL DO YOU HAVE ON A TYPICAL DAY: 1 OR 2

## 2024-09-09 ASSESSMENT — PAIN SCALES - GENERAL: PAINLEVEL_OUTOF10: 9

## 2024-09-09 ASSESSMENT — PAIN DESCRIPTION - LOCATION: LOCATION: HAND

## 2024-10-29 ENCOUNTER — APPOINTMENT (OUTPATIENT)
Facility: HOSPITAL | Age: 34
End: 2024-10-29
Payer: COMMERCIAL

## 2024-10-29 ENCOUNTER — HOSPITAL ENCOUNTER (EMERGENCY)
Facility: HOSPITAL | Age: 34
Discharge: HOME | End: 2024-10-29
Payer: COMMERCIAL

## 2024-10-29 VITALS
OXYGEN SATURATION: 95 % | WEIGHT: 293 LBS | SYSTOLIC BLOOD PRESSURE: 146 MMHG | DIASTOLIC BLOOD PRESSURE: 91 MMHG | HEART RATE: 94 BPM | BODY MASS INDEX: 43.4 KG/M2 | RESPIRATION RATE: 29 BRPM | TEMPERATURE: 98.3 F | HEIGHT: 69 IN

## 2024-10-29 DIAGNOSIS — J45.21 MILD INTERMITTENT ASTHMA WITH EXACERBATION: ICD-10-CM

## 2024-10-29 DIAGNOSIS — J40 BRONCHITIS: Primary | ICD-10-CM

## 2024-10-29 LAB
FLUAV RNA SPEC QL NAA+PROBE: NOT DETECTED
FLUBV RNA SPEC QL NAA+PROBE: NOT DETECTED
SARS-COV-2 RNA RESP QL NAA+PROBE: NOT DETECTED

## 2024-10-29 PROCEDURE — 96375 TX/PRO/DX INJ NEW DRUG ADDON: CPT

## 2024-10-29 PROCEDURE — 71045 X-RAY EXAM CHEST 1 VIEW: CPT

## 2024-10-29 PROCEDURE — 99285 EMERGENCY DEPT VISIT HI MDM: CPT

## 2024-10-29 PROCEDURE — 2580000003 HC RX 258: Performed by: PHYSICIAN ASSISTANT

## 2024-10-29 PROCEDURE — 93005 ELECTROCARDIOGRAM TRACING: CPT | Performed by: PHYSICIAN ASSISTANT

## 2024-10-29 PROCEDURE — 6360000002 HC RX W HCPCS: Performed by: PHYSICIAN ASSISTANT

## 2024-10-29 PROCEDURE — 96365 THER/PROPH/DIAG IV INF INIT: CPT

## 2024-10-29 PROCEDURE — 87636 SARSCOV2 & INF A&B AMP PRB: CPT

## 2024-10-29 PROCEDURE — 6370000000 HC RX 637 (ALT 250 FOR IP): Performed by: PHYSICIAN ASSISTANT

## 2024-10-29 RX ORDER — DEXTROMETHORPHAN HYDROBROMIDE AND PROMETHAZINE HYDROCHLORIDE 15; 6.25 MG/5ML; MG/5ML
2.5 SYRUP ORAL NIGHTLY PRN
Qty: 30 ML | Refills: 0 | Status: SHIPPED | OUTPATIENT
Start: 2024-10-29

## 2024-10-29 RX ORDER — ALBUTEROL SULFATE 90 UG/1
2 INHALANT RESPIRATORY (INHALATION) EVERY 6 HOURS PRN
Qty: 18 G | Refills: 0 | Status: SHIPPED | OUTPATIENT
Start: 2024-10-29

## 2024-10-29 RX ORDER — CETIRIZINE HYDROCHLORIDE 10 MG/1
10 TABLET ORAL DAILY
Qty: 30 TABLET | Refills: 0 | Status: SHIPPED | OUTPATIENT
Start: 2024-10-29

## 2024-10-29 RX ORDER — IPRATROPIUM BROMIDE AND ALBUTEROL SULFATE 2.5; .5 MG/3ML; MG/3ML
1 SOLUTION RESPIRATORY (INHALATION)
Status: COMPLETED | OUTPATIENT
Start: 2024-10-29 | End: 2024-10-29

## 2024-10-29 RX ORDER — ONDANSETRON 2 MG/ML
4 INJECTION INTRAMUSCULAR; INTRAVENOUS ONCE
Status: COMPLETED | OUTPATIENT
Start: 2024-10-29 | End: 2024-10-29

## 2024-10-29 RX ORDER — IBUPROFEN 800 MG/1
800 TABLET, FILM COATED ORAL EVERY 8 HOURS PRN
Qty: 20 TABLET | Refills: 0 | Status: SHIPPED | OUTPATIENT
Start: 2024-10-29

## 2024-10-29 RX ORDER — ACETAMINOPHEN 500 MG
1000 TABLET ORAL 4 TIMES DAILY PRN
Qty: 40 TABLET | Refills: 0 | Status: SHIPPED | OUTPATIENT
Start: 2024-10-29

## 2024-10-29 RX ORDER — MAGNESIUM SULFATE 1 G/100ML
1000 INJECTION INTRAVENOUS
Status: COMPLETED | OUTPATIENT
Start: 2024-10-29 | End: 2024-10-29

## 2024-10-29 RX ORDER — KETOROLAC TROMETHAMINE 30 MG/ML
30 INJECTION, SOLUTION INTRAMUSCULAR; INTRAVENOUS
Status: COMPLETED | OUTPATIENT
Start: 2024-10-29 | End: 2024-10-29

## 2024-10-29 RX ADMIN — KETOROLAC TROMETHAMINE 30 MG: 30 INJECTION, SOLUTION INTRAMUSCULAR; INTRAVENOUS at 08:42

## 2024-10-29 RX ADMIN — ONDANSETRON 4 MG: 2 INJECTION INTRAMUSCULAR; INTRAVENOUS at 09:39

## 2024-10-29 RX ADMIN — METHYLPREDNISOLONE SODIUM SUCCINATE 125 MG: 125 INJECTION INTRAMUSCULAR; INTRAVENOUS at 08:40

## 2024-10-29 RX ADMIN — IPRATROPIUM BROMIDE AND ALBUTEROL SULFATE 1 DOSE: .5; 2.5 SOLUTION RESPIRATORY (INHALATION) at 08:45

## 2024-10-29 RX ADMIN — MAGNESIUM SULFATE HEPTAHYDRATE 1000 MG: 1 INJECTION, SOLUTION INTRAVENOUS at 09:22

## 2024-10-29 RX ADMIN — IPRATROPIUM BROMIDE AND ALBUTEROL SULFATE 1 DOSE: .5; 2.5 SOLUTION RESPIRATORY (INHALATION) at 09:18

## 2024-10-29 ASSESSMENT — PAIN SCALES - GENERAL
PAINLEVEL_OUTOF10: 7
PAINLEVEL_OUTOF10: 6
PAINLEVEL_OUTOF10: 8

## 2024-10-29 ASSESSMENT — PAIN - FUNCTIONAL ASSESSMENT: PAIN_FUNCTIONAL_ASSESSMENT: 0-10

## 2024-10-29 NOTE — ED TRIAGE NOTES
Pt states that she is congested and has body aches and a headache and its causing her trouble breathing, pt also states it got worse overnight

## 2024-10-29 NOTE — ED PROVIDER NOTES
strength  how much to take  when to take this  reasons to take this     albuterol sulfate  (90 Base) MCG/ACT inhaler  Commonly known as: PROVENTIL;VENTOLIN;PROAIR  Inhale 2 puffs into the lungs every 6 hours as needed for Wheezing  What changed:   when to take this  reasons to take this     ibuprofen 800 MG tablet  Commonly known as: ADVIL;MOTRIN  Take 1 tablet by mouth every 8 hours as needed for Pain  What changed:   medication strength  how much to take  when to take this  reasons to take this            STOP taking these medications      ketorolac 10 MG tablet  Commonly known as: TORADOL            ASK your doctor about these medications      SUMAtriptan 100 MG tablet  Commonly known as: IMITREX     topiramate 25 MG capsule  Commonly known as: TOPAMAX SPRINKLE               Where to Get Your Medications        These medications were sent to [address] [phone] -  [phone]  [address] [place] [id number]      Phone: [phone]   acetaminophen 500 MG tablet  albuterol sulfate  (90 Base) MCG/ACT inhaler  cetirizine 10 MG tablet  ibuprofen 800 MG tablet  promethazine-dextromethorphan 6.25-15 MG/5ML syrup           DISCONTINUED MEDICATIONS:  Discharge Medication List as of 10/29/2024 10:22 AM          I am the Primary Clinician of Record: Shawn Serna PA-C (electronically signed)    (Please note that parts of this dictation were completed with voice recognition software. Quite often unanticipated grammatical, syntax, homophones, and other interpretive errors are inadvertently transcribed by the computer software. Please disregards these errors. Please excuse any errors that have escaped final proofreading.)     Shawn Serna PA-C  10/29/24 1039

## 2024-10-30 LAB
EKG ATRIAL RATE: 69 BPM
EKG DIAGNOSIS: NORMAL
EKG P AXIS: 43 DEGREES
EKG P-R INTERVAL: 142 MS
EKG Q-T INTERVAL: 390 MS
EKG QRS DURATION: 80 MS
EKG QTC CALCULATION (BAZETT): 417 MS
EKG R AXIS: 2 DEGREES
EKG T AXIS: 17 DEGREES
EKG VENTRICULAR RATE: 69 BPM

## 2025-01-19 ENCOUNTER — HOSPITAL ENCOUNTER (EMERGENCY)
Facility: HOSPITAL | Age: 35
Discharge: HOME | End: 2025-01-19
Attending: EMERGENCY MEDICINE
Payer: COMMERCIAL

## 2025-01-19 VITALS
HEIGHT: 69 IN | RESPIRATION RATE: 18 BRPM | WEIGHT: 293 LBS | DIASTOLIC BLOOD PRESSURE: 103 MMHG | HEART RATE: 82 BPM | TEMPERATURE: 97.7 F | BODY MASS INDEX: 43.4 KG/M2 | OXYGEN SATURATION: 98 % | SYSTOLIC BLOOD PRESSURE: 141 MMHG

## 2025-01-19 DIAGNOSIS — S39.012A STRAIN OF LUMBAR REGION, INITIAL ENCOUNTER: Primary | ICD-10-CM

## 2025-01-19 PROCEDURE — 99283 EMERGENCY DEPT VISIT LOW MDM: CPT

## 2025-01-19 PROCEDURE — 6370000000 HC RX 637 (ALT 250 FOR IP): Performed by: EMERGENCY MEDICINE

## 2025-01-19 RX ORDER — NAPROXEN 500 MG/1
500 TABLET ORAL 2 TIMES DAILY
Qty: 60 TABLET | Refills: 0 | Status: SHIPPED | OUTPATIENT
Start: 2025-01-19

## 2025-01-19 RX ORDER — METHOCARBAMOL 750 MG/1
750 TABLET, FILM COATED ORAL 4 TIMES DAILY
Qty: 40 TABLET | Refills: 0 | Status: SHIPPED | OUTPATIENT
Start: 2025-01-19 | End: 2025-01-29

## 2025-01-19 RX ORDER — TRAMADOL HYDROCHLORIDE 50 MG/1
50 TABLET ORAL ONCE
Status: COMPLETED | OUTPATIENT
Start: 2025-01-19 | End: 2025-01-19

## 2025-01-19 RX ADMIN — TRAMADOL HYDROCHLORIDE 50 MG: 50 TABLET, COATED ORAL at 22:55

## 2025-01-19 ASSESSMENT — PAIN DESCRIPTION - PAIN TYPE: TYPE: ACUTE PAIN

## 2025-01-19 ASSESSMENT — PAIN DESCRIPTION - FREQUENCY: FREQUENCY: CONTINUOUS

## 2025-01-19 ASSESSMENT — PAIN - FUNCTIONAL ASSESSMENT
PAIN_FUNCTIONAL_ASSESSMENT: 0-10
PAIN_FUNCTIONAL_ASSESSMENT: ACTIVITIES ARE NOT PREVENTED

## 2025-01-19 ASSESSMENT — PAIN SCALES - GENERAL: PAINLEVEL_OUTOF10: 10

## 2025-01-19 ASSESSMENT — PAIN DESCRIPTION - ORIENTATION: ORIENTATION: LOWER

## 2025-01-19 ASSESSMENT — PAIN DESCRIPTION - ONSET: ONSET: SUDDEN

## 2025-01-19 ASSESSMENT — PAIN DESCRIPTION - LOCATION: LOCATION: BACK

## 2025-01-19 ASSESSMENT — PAIN DESCRIPTION - DESCRIPTORS: DESCRIPTORS: ACHING

## 2025-01-20 NOTE — ED TRIAGE NOTES
Patient presents to ED from home for c/o low back pain, left worse than right that radiates down her left leg x 2 days. Patient denies injury or trauma. Patient denies taking medication for pain. Patient denies dysuria.

## 2025-01-20 NOTE — ED PROVIDER NOTES
EMERGENCY DEPARTMENT HISTORY AND PHYSICAL EXAM      Date: 1/19/2025  Patient Name: Irene Lombardi    History of Presenting Illness     Chief Complaint   Patient presents with    Back Pain       History Provided By: Patient    HPI: Irene Lombardi, 34 y.o. female presents to the ED with CC of left-sided low back pain patient has no past medical history significant for lumbar pain but now she says it radiates down the back of her leg.  She denies any trauma denies any loss of bowel or bladder function no appreciable crepitus.  She has not treated with anything is failed to improve.       Patient denies SOB, chest pain, or any neurological symptoms.  There are no other complaints, changes, or physical findings at this time.     Past History     Past Medical History:  Past Medical History:   Diagnosis Date    Asthma     Migraines        Allergies:  No Known Allergies    Review of Systems   Vital signs and nursing notes reviewed    Physical Exam     Vitals:    01/19/25 2244   BP: (!) 141/103   Pulse: 82   Resp: 18   Temp: 97.7 °F (36.5 °C)   SpO2: 98%     CONSTITUTIONAL: Alert, in no distress. Appears stated age.  HEAD:  Normocephalic, atraumatic  EYES: EOM intact.  No conjunctival injection or scleral icterus  Neck:  Supple. No meningismus  RESP: Normal with no work of breathing, speaking in full sentences.  CV: Well perfused.   NEURO: Alert with normal mentation, moving extremities spontaneously  PSYCH: Normal mood, normal affect  ABDOMEN: Soft, non tender, non distended  SKIN: No appreciable rashes, no erythema  MSK: Left buttock tenderness.  Medical Decision Making         ED COURSE and DIFFERENTIAL DIAGNOSIS/MDM   CC/HPI Summary, DDx, ED Course, and Reassessment: Patient has evidence of lumbar radiculopathy versus sciatica.  Will treat with muscle relaxers and anti-inflammatory    Records Reviewed (source and summary of external notes): Prior medical records and Nursing notes    Vitals:    Vitals:

## 2025-06-08 ENCOUNTER — APPOINTMENT (OUTPATIENT)
Facility: HOSPITAL | Age: 35
End: 2025-06-08
Payer: COMMERCIAL

## 2025-06-08 ENCOUNTER — HOSPITAL ENCOUNTER (EMERGENCY)
Facility: HOSPITAL | Age: 35
Discharge: HOME | End: 2025-06-08
Payer: COMMERCIAL

## 2025-06-08 VITALS
TEMPERATURE: 98.1 F | DIASTOLIC BLOOD PRESSURE: 100 MMHG | WEIGHT: 293 LBS | HEIGHT: 68 IN | OXYGEN SATURATION: 100 % | BODY MASS INDEX: 44.41 KG/M2 | RESPIRATION RATE: 18 BRPM | SYSTOLIC BLOOD PRESSURE: 135 MMHG | HEART RATE: 101 BPM

## 2025-06-08 DIAGNOSIS — M54.12 CERVICAL RADICULAR PAIN: Primary | ICD-10-CM

## 2025-06-08 DIAGNOSIS — M25.512 LEFT SHOULDER PAIN, UNSPECIFIED CHRONICITY: ICD-10-CM

## 2025-06-08 PROCEDURE — 96372 THER/PROPH/DIAG INJ SC/IM: CPT

## 2025-06-08 PROCEDURE — 99284 EMERGENCY DEPT VISIT MOD MDM: CPT

## 2025-06-08 PROCEDURE — 73030 X-RAY EXAM OF SHOULDER: CPT

## 2025-06-08 PROCEDURE — 6370000000 HC RX 637 (ALT 250 FOR IP)

## 2025-06-08 PROCEDURE — 6360000002 HC RX W HCPCS

## 2025-06-08 RX ORDER — KETOROLAC TROMETHAMINE 10 MG/1
10 TABLET, FILM COATED ORAL EVERY 6 HOURS PRN
Qty: 20 TABLET | Refills: 0 | Status: SHIPPED | OUTPATIENT
Start: 2025-06-08

## 2025-06-08 RX ORDER — TRAMADOL HYDROCHLORIDE 50 MG/1
50 TABLET ORAL
Status: COMPLETED | OUTPATIENT
Start: 2025-06-08 | End: 2025-06-08

## 2025-06-08 RX ORDER — KETOROLAC TROMETHAMINE 30 MG/ML
30 INJECTION, SOLUTION INTRAMUSCULAR; INTRAVENOUS
Status: COMPLETED | OUTPATIENT
Start: 2025-06-08 | End: 2025-06-08

## 2025-06-08 RX ORDER — METHYLPREDNISOLONE 4 MG/1
TABLET ORAL
Qty: 1 KIT | Refills: 0 | Status: SHIPPED | OUTPATIENT
Start: 2025-06-08

## 2025-06-08 RX ORDER — LIDOCAINE 4 G/G
1 PATCH TOPICAL DAILY
Qty: 20 EACH | Refills: 0 | Status: SHIPPED | OUTPATIENT
Start: 2025-06-08 | End: 2025-06-28

## 2025-06-08 RX ADMIN — KETOROLAC TROMETHAMINE 30 MG: 30 INJECTION, SOLUTION INTRAMUSCULAR at 20:43

## 2025-06-08 RX ADMIN — TRAMADOL HYDROCHLORIDE 50 MG: 50 TABLET, COATED ORAL at 20:42

## 2025-06-08 ASSESSMENT — PAIN DESCRIPTION - LOCATION
LOCATION: HAND;SHOULDER;ARM
LOCATION: ARM

## 2025-06-08 ASSESSMENT — PAIN SCALES - GENERAL: PAINLEVEL_OUTOF10: 10

## 2025-06-08 ASSESSMENT — PAIN DESCRIPTION - ORIENTATION
ORIENTATION: LEFT
ORIENTATION: LEFT

## 2025-06-08 ASSESSMENT — PAIN - FUNCTIONAL ASSESSMENT: PAIN_FUNCTIONAL_ASSESSMENT: 0-10

## 2025-06-09 NOTE — ED PROVIDER NOTES
2.5 mLs by mouth nightly as needed for Cough     SUMAtriptan 100 MG tablet  Commonly known as: IMITREX     topiramate 25 MG capsule  Commonly known as: TOPAMAX SPRINKLE               Where to Get Your Medications        These medications were sent to [place], [place] - [address] [facility] [phone] - F [phone]  [address] [facility] [address]      Phone: [phone]   ketorolac 10 MG tablet  lidocaine 4 % external patch  methylPREDNISolone 4 MG tablet           DISCONTINUED MEDICATIONS:  Discharge Medication List as of 6/8/2025  9:43 PM        STOP taking these medications       naproxen (NAPROSYN) 500 MG tablet Comments:   Reason for Stopping:         ibuprofen (ADVIL;MOTRIN) 800 MG tablet Comments:   Reason for Stopping:               I am the Primary Clinician of Record. Frank Cutler PA-C (electronically signed)    (Please note that parts of this dictation were completed with voice recognition software. Quite often unanticipated grammatical, syntax, homophones, and other interpretive errors are inadvertently transcribed by the computer software. Please disregards these errors. Please excuse any errors that have escaped final proofreading.)       Frank Cutler PA-C  06/10/25 4004
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
## 2025-06-17 ENCOUNTER — OFFICE VISIT (OUTPATIENT)
Age: 35
End: 2025-06-17
Payer: COMMERCIAL

## 2025-06-17 DIAGNOSIS — G95.9 CERVICAL MYELOPATHY (HCC): ICD-10-CM

## 2025-06-17 DIAGNOSIS — M54.12 CERVICAL RADICULOPATHY: Primary | ICD-10-CM

## 2025-06-17 DIAGNOSIS — M54.2 NECK PAIN: ICD-10-CM

## 2025-06-17 PROCEDURE — 99203 OFFICE O/P NEW LOW 30 MIN: CPT | Performed by: STUDENT IN AN ORGANIZED HEALTH CARE EDUCATION/TRAINING PROGRAM

## 2025-06-17 RX ORDER — PREGABALIN 75 MG/1
75 CAPSULE ORAL 2 TIMES DAILY
Qty: 60 CAPSULE | Refills: 1 | Status: SHIPPED | OUTPATIENT
Start: 2025-06-17 | End: 2025-07-17

## 2025-06-17 RX ORDER — HYDROCHLOROTHIAZIDE 25 MG/1
TABLET ORAL
COMMUNITY
Start: 2024-07-02

## 2025-06-17 ASSESSMENT — PATIENT HEALTH QUESTIONNAIRE - PHQ9
SUM OF ALL RESPONSES TO PHQ QUESTIONS 1-9: 1
1. LITTLE INTEREST OR PLEASURE IN DOING THINGS: NOT AT ALL
2. FEELING DOWN, DEPRESSED OR HOPELESS: SEVERAL DAYS
SUM OF ALL RESPONSES TO PHQ QUESTIONS 1-9: 1
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
## 2025-07-02 ENCOUNTER — HOSPITAL ENCOUNTER (OUTPATIENT)
Facility: HOSPITAL | Age: 35
Discharge: HOME | End: 2025-07-05
Attending: STUDENT IN AN ORGANIZED HEALTH CARE EDUCATION/TRAINING PROGRAM
Payer: COMMERCIAL

## 2025-07-02 DIAGNOSIS — M54.2 NECK PAIN: ICD-10-CM

## 2025-07-02 PROCEDURE — 72141 MRI NECK SPINE W/O DYE: CPT

## 2025-07-17 ENCOUNTER — OFFICE VISIT (OUTPATIENT)
Age: 35
End: 2025-07-17
Payer: COMMERCIAL

## 2025-07-17 VITALS — BODY MASS INDEX: 48.66 KG/M2 | HEIGHT: 68 IN

## 2025-07-17 DIAGNOSIS — R20.0 ARM NUMBNESS LEFT: Primary | ICD-10-CM

## 2025-07-17 DIAGNOSIS — M54.2 NECK PAIN: ICD-10-CM

## 2025-07-17 PROCEDURE — 99214 OFFICE O/P EST MOD 30 MIN: CPT | Performed by: STUDENT IN AN ORGANIZED HEALTH CARE EDUCATION/TRAINING PROGRAM

## 2025-07-17 RX ORDER — MELOXICAM 15 MG/1
15 TABLET ORAL DAILY
Qty: 30 TABLET | Refills: 1 | Status: SHIPPED | OUTPATIENT
Start: 2025-07-17
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
## (undated) DEVICE — SOLUTION IRRIG 500ML 0.9% SOD CHLO USP POUR PLAS BTL

## (undated) DEVICE — SUTURE PDS + SZ 0 L27IN ABSRB VLT L36MM CT 1 1 2 CIR PDP340H

## (undated) DEVICE — KIT SUTURING DEVICE M-CLOSE

## (undated) DEVICE — BLADELESS OBTURATOR: Brand: WECK VISTA

## (undated) DEVICE — INTENDED FOR TISSUE SEPARATION, AND OTHER PROCEDURES THAT REQUIRE A SHARP SURGICAL BLADE TO PUNCTURE OR CUT.: Brand: BARD-PARKER ® CARBON RIB-BACK BLADES

## (undated) DEVICE — TIP COVER ACCESSORY

## (undated) DEVICE — ULNAR NERVE PROTECTOR FOAM POSITIONER: Brand: CARDINAL HEALTH

## (undated) DEVICE — SUTURE MCRYL + SZ 4-0 L27IN ABSRB UD L19MM PS-2 3/8 CIR MCP426H

## (undated) DEVICE — SYRINGE MED 30ML STD CLR PLAS LUERLOCK TIP N CTRL DISP

## (undated) DEVICE — COVER,MAYO STAND,STERILE: Brand: MEDLINE

## (undated) DEVICE — GARMENT,MEDLINE,DVT,INT,CALF,MED, GEN2: Brand: MEDLINE

## (undated) DEVICE — STRIP SKIN CLSR 1/4INX1.5IN REINF WND NYL CURAD

## (undated) DEVICE — SOUTHSIDE TURNOVER: Brand: MEDLINE INDUSTRIES, INC.

## (undated) DEVICE — APPLICATOR MEDICATED 26 CC SOLUTION HI LT ORNG CHLORAPREP

## (undated) DEVICE — GLOVE ORANGE PI 7   MSG9070

## (undated) DEVICE — ELECTRO LUBE IS A SINGLE PATIENT USE DEVICE THAT IS INTENDED TO BE USED ON ELECTROSURGICAL ELECTRODES TO REDUCE STICKING.: Brand: KEY SURGICAL ELECTRO LUBE

## (undated) DEVICE — TROCAR: Brand: KII FIOS FIRST ENTRY

## (undated) DEVICE — ARM DRAPE

## (undated) DEVICE — TISSUE RETRIEVAL SYSTEM: Brand: INZII RETRIEVAL SYSTEM

## (undated) DEVICE — LAPAROSCOPIC CHOLE PACK: Brand: MEDLINE INDUSTRIES, INC.

## (undated) DEVICE — SEAL

## (undated) DEVICE — MASTISOL ADHESIVE LIQ 2/3ML

## (undated) DEVICE — SUTURE SZ 0 27IN 5/8 CIR UR-6  TAPER PT VIOLET ABSRB VICRYL J603H

## (undated) DEVICE — GOWN SURG XL 56.5 IN AAMI LEVEL 3 ORBIS

## (undated) DEVICE — TUBING INSUFFLATOR HEAT HUMIDIFIED SMK EVAC SET PNEUMOCLEAR

## (undated) DEVICE — COLUMN DRAPE

## (undated) DEVICE — HYPODERMIC SAFETY NEEDLE: Brand: MONOJECT